# Patient Record
Sex: FEMALE | Race: WHITE | Employment: OTHER | URBAN - METROPOLITAN AREA
[De-identification: names, ages, dates, MRNs, and addresses within clinical notes are randomized per-mention and may not be internally consistent; named-entity substitution may affect disease eponyms.]

---

## 2019-10-09 PROBLEM — R35.0 FREQUENCY OF MICTURITION: Status: ACTIVE | Noted: 2019-10-09

## 2019-10-09 PROBLEM — R30.0 DYSURIA: Status: ACTIVE | Noted: 2019-10-09

## 2019-10-09 PROBLEM — N39.0 RECURRENT UTI: Status: ACTIVE | Noted: 2019-10-09

## 2020-12-14 ENCOUNTER — TELEPHONE (OUTPATIENT)
Dept: SCHEDULING | Facility: IMAGING CENTER | Age: 75
End: 2020-12-14

## 2021-01-04 ENCOUNTER — OFFICE VISIT (OUTPATIENT)
Dept: MEDICAL GROUP | Facility: CLINIC | Age: 76
End: 2021-01-04
Payer: MEDICARE

## 2021-01-04 VITALS
DIASTOLIC BLOOD PRESSURE: 82 MMHG | HEART RATE: 78 BPM | WEIGHT: 199 LBS | OXYGEN SATURATION: 92 % | TEMPERATURE: 98 F | BODY MASS INDEX: 36.62 KG/M2 | SYSTOLIC BLOOD PRESSURE: 122 MMHG | HEIGHT: 62 IN | RESPIRATION RATE: 18 BRPM

## 2021-01-04 DIAGNOSIS — Z23 NEED FOR VACCINATION: ICD-10-CM

## 2021-01-04 DIAGNOSIS — F41.9 ANXIETY: ICD-10-CM

## 2021-01-04 DIAGNOSIS — M19.90 ARTHRITIS: ICD-10-CM

## 2021-01-04 DIAGNOSIS — Z00.00 HEALTHCARE MAINTENANCE: ICD-10-CM

## 2021-01-04 DIAGNOSIS — G62.9 NEUROPATHY: ICD-10-CM

## 2021-01-04 DIAGNOSIS — J45.30 MILD PERSISTENT ASTHMA WITHOUT COMPLICATION: ICD-10-CM

## 2021-01-04 DIAGNOSIS — N39.0 RECURRENT UTI: ICD-10-CM

## 2021-01-04 DIAGNOSIS — F32.0 CURRENT MILD EPISODE OF MAJOR DEPRESSIVE DISORDER WITHOUT PRIOR EPISODE (HCC): ICD-10-CM

## 2021-01-04 PROBLEM — F32.9 CURRENT EPISODE OF MAJOR DEPRESSIVE DISORDER WITHOUT PRIOR EPISODE: Status: ACTIVE | Noted: 2021-01-04

## 2021-01-04 PROCEDURE — 99203 OFFICE O/P NEW LOW 30 MIN: CPT | Mod: 25 | Performed by: PHYSICIAN ASSISTANT

## 2021-01-04 PROCEDURE — 90662 IIV NO PRSV INCREASED AG IM: CPT | Performed by: PHYSICIAN ASSISTANT

## 2021-01-04 PROCEDURE — G0008 ADMIN INFLUENZA VIRUS VAC: HCPCS | Performed by: PHYSICIAN ASSISTANT

## 2021-01-04 RX ORDER — SULFAMETHOXAZOLE AND TRIMETHOPRIM 400; 80 MG/1; MG/1
1 TABLET ORAL DAILY
Qty: 90 TAB | Refills: 0 | Status: SHIPPED | OUTPATIENT
Start: 2021-01-04 | End: 2022-01-12 | Stop reason: SDUPTHER

## 2021-01-04 RX ORDER — GABAPENTIN 100 MG/1
100 CAPSULE ORAL 3 TIMES DAILY
Qty: 90 CAP | Refills: 3 | Status: SHIPPED
Start: 2021-01-04 | End: 2021-02-16

## 2021-01-04 ASSESSMENT — PATIENT HEALTH QUESTIONNAIRE - PHQ9: CLINICAL INTERPRETATION OF PHQ2 SCORE: 0

## 2021-01-04 NOTE — PROGRESS NOTES
cc:  Establish care    Subjective:     Soo Huff is a 75 y.o. female presenting for establish care      Patient presents to the office for \[facility]\"" care.   Patient does have a history of asthma and is controlled on her advair.  She states in the summer she rarely has an issue.     Patient states that she has a history of chronic UTI's and takes a maintenace dose of single dose bactrim.  She states that she usually contacts her urologist Dr. Pedroza for further help.  He is in [place] and so if she cannot get in to see him for an infection, she wants for us to be aware in case an abx is needed.   She is resistant to bactrim for a flare up UTI.      She does travel to [place] twice a week and so will be back and forth.      Patient is on sertraline for anxiety and depression.  She has tried to quit the medication a few times but does get depressed when she goes off the medication.  She has been on the medication longer than 20 years.    Patient states that she has a history of shingles which occurred 4 hears ago.  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      Patient is needing a refill of her bactrim for her recurrent UTI until she can be seen by Urology    Patient states that she has arthritis in her hands.  It hurts worse in the evening.  She states that she will feel it in her hands and elbows.  She states testing has been negative for rheumatoid arthritis.    Review of systems:  See above.   Denies any symptoms unless previously indicated.        Current Outpatient Medications:   •  fluticasone-salmeterol (ADVAIR DISKUS) 250-50 MCG/DOSE AEROSOL POWDER, BREATH ACTIVATED, Inhale 1 Puff 2 times a day., Disp: 1 Each, Rfl: 9  •  gabapentin (NEURONTIN) 100 MG Cap, Take 1 Cap by mouth 3 times a day., Disp: 90 Cap, Rfl: 3  •  sulfamethoxazole-trimethoprim (BACTRIM) 400-80 MG Tab, Take 1 Tab by mouth every day., Disp: 90 Tab, Rfl: 0  •  sertraline (ZOLOFT) 50 MG Tab,  "Take 1 Tab by mouth every day., Disp: 30 Tab, Rfl: 6  •  diclofenac sodium 1 % Gel, Apply 1 g topically 4 times a day., Disp: 350 g, Rfl: 3  •  Pseudoeph-Doxylamine-DM-APAP (NYQUIL PO), Take  by mouth., Disp: , Rfl:   •  Glucos-Chond-Hyal Ac-Ca Fructo (MOVE FREE JOINT HEALTH ADVANCE PO), Take 2 Tabs by mouth., Disp: , Rfl:   •  Multiple Vitamins-Minerals (CENTRUM SILVER 50+WOMEN) Tab, Take 1 Tab by mouth., Disp: , Rfl:     Allergies, past medical history, past surgical history, family history, social history reviewed and updated    Objective:     Vitals: /82 (BP Location: Left arm, Patient Position: Sitting, BP Cuff Size: Adult)   Pulse 78   Temp 36.7 °C (98 °F) (Temporal)   Resp 18   Ht 1.575 m (5' 2\") Comment: with shoes  Wt 90.3 kg (199 lb) Comment: with shoes  SpO2 92%   BMI 36.40 kg/m²   General: Alert, pleasant, NAD  EYES:   PERRL, EOMI, no icterus or pallor.  Conjunctivae and lids normal.   HENT:  Normocephalic.  External ears normal.   Neck supple.    Respiratory: Normal respiratory effort.    Abdomen: obese  Skin: Warm, dry, no rashes.  Musculoskeletal: Gait is normal.  Moves all extremities well.    Extremities: normal range of motion all extremities.   Neurological: No tremors, sensation grossly intact, CN2-12 intact.  Psych:  Affect/mood is normal, judgement is good, memory is intact, grooming is appropriate.    Assessment/Plan:     Soo was seen today for establish care and arthritis.    Diagnoses and all orders for this visit:    Mild persistent asthma without complication  -     fluticasone-salmeterol (ADVAIR DISKUS) 250-50 MCG/DOSE AEROSOL POWDER, BREATH ACTIVATED; Inhale 1 Puff 2 times a day.    We will refill patient's Advair at this time.  Continue to monitor.    Neuropathy  -     gabapentin (NEURONTIN) 100 MG Cap; Take 1 Cap by mouth 3 times a day.    From history of zoster.  Patient would be a candidate for the Zostavax vaccine.  She will discuss this with her insurance first and if " covered, she will have it done in office.  Otherwise we will provide a prescription where she can have it done at the pharmacy.    Healthcare maintenance  -     Lipid Profile; Future  -     Comp Metabolic Panel; Future  -     TSH; Future  -     FREE THYROXINE; Future    Labs have been ordered to evaluate further.    Current mild episode of major depressive disorder without prior episode (HCC)  -     sertraline (ZOLOFT) 50 MG Tab; Take 1 Tab by mouth every day.  Anxiety  -     sertraline (ZOLOFT) 50 MG Tab; Take 1 Tab by mouth every day.    Occasions have been refilled at this time.  We did discuss the possibility of weaning off medication which would be a very slow process which would occur over months.  Patient is aware of this.    Recurrent UTI  -     sulfamethoxazole-trimethoprim (BACTRIM) 400-80 MG Tab; Take 1 Tab by mouth every day.    Refilled until she can discuss with urology.    Arthritis  -     diclofenac sodium 1 % Gel; Apply 1 g topically 4 times a day.    We will try patient on diclofenac.  Patient to let us know if any issues with medication.    Need for vaccination  -     Influenza Vaccine, High Dose (65+ Only)    Flu vaccine given today.    This is a 30-minute appointment with greater than 50% spent in education counseling and coordination of care.        Return in about 4 weeks (around 2/1/2021), or if symptoms worsen or fail to improve, for 4-8 weeks.    Please note that this dictation was created using voice recognition software. I have made every reasonable attempt to correct obvious errors, but expect that there are errors of grammar and possible content that I did not discover before finalizing note.

## 2021-02-03 ENCOUNTER — HOSPITAL ENCOUNTER (OUTPATIENT)
Facility: MEDICAL CENTER | Age: 76
End: 2021-02-03
Attending: PHYSICIAN ASSISTANT
Payer: MEDICARE

## 2021-02-03 ENCOUNTER — NON-PROVIDER VISIT (OUTPATIENT)
Dept: MEDICAL GROUP | Facility: CLINIC | Age: 76
End: 2021-02-03
Payer: MEDICARE

## 2021-02-03 ENCOUNTER — OFFICE VISIT (OUTPATIENT)
Dept: MEDICAL GROUP | Facility: CLINIC | Age: 76
End: 2021-02-03
Payer: MEDICARE

## 2021-02-03 VITALS
DIASTOLIC BLOOD PRESSURE: 78 MMHG | SYSTOLIC BLOOD PRESSURE: 122 MMHG | HEIGHT: 62 IN | TEMPERATURE: 97.8 F | WEIGHT: 201 LBS | RESPIRATION RATE: 18 BRPM | OXYGEN SATURATION: 93 % | BODY MASS INDEX: 36.99 KG/M2 | HEART RATE: 77 BPM

## 2021-02-03 DIAGNOSIS — Z00.00 HEALTHCARE MAINTENANCE: ICD-10-CM

## 2021-02-03 DIAGNOSIS — R30.0 DYSURIA: ICD-10-CM

## 2021-02-03 DIAGNOSIS — B35.4 TINEA CORPORIS: ICD-10-CM

## 2021-02-03 DIAGNOSIS — N39.0 RECURRENT UTI: ICD-10-CM

## 2021-02-03 DIAGNOSIS — R35.0 FREQUENCY OF MICTURITION: ICD-10-CM

## 2021-02-03 LAB
APPEARANCE UR: CLEAR
BILIRUB UR STRIP-MCNC: NORMAL MG/DL
COLOR UR AUTO: YELLOW
GLUCOSE UR STRIP.AUTO-MCNC: NORMAL MG/DL
KETONES UR STRIP.AUTO-MCNC: NORMAL MG/DL
LEUKOCYTE ESTERASE UR QL STRIP.AUTO: NORMAL
NITRITE UR QL STRIP.AUTO: NORMAL
PH UR STRIP.AUTO: 5 [PH] (ref 5–8)
PROT UR QL STRIP: NORMAL MG/DL
RBC UR QL AUTO: NORMAL
SP GR UR STRIP.AUTO: 1.03
UROBILINOGEN UR STRIP-MCNC: 0.2 MG/DL

## 2021-02-03 PROCEDURE — 99213 OFFICE O/P EST LOW 20 MIN: CPT | Performed by: PHYSICIAN ASSISTANT

## 2021-02-03 PROCEDURE — 87086 URINE CULTURE/COLONY COUNT: CPT

## 2021-02-03 PROCEDURE — 81002 URINALYSIS NONAUTO W/O SCOPE: CPT | Performed by: PHYSICIAN ASSISTANT

## 2021-02-03 PROCEDURE — 36415 COLL VENOUS BLD VENIPUNCTURE: CPT | Performed by: PHYSICIAN ASSISTANT

## 2021-02-03 RX ORDER — PHENAZOPYRIDINE HYDROCHLORIDE 200 MG/1
200 TABLET, FILM COATED ORAL 3 TIMES DAILY PRN
Qty: 6 TAB | Refills: 0 | Status: SHIPPED
Start: 2021-02-03 | End: 2021-02-16

## 2021-02-03 RX ORDER — NITROFURANTOIN 25; 75 MG/1; MG/1
100 CAPSULE ORAL 2 TIMES DAILY
Qty: 14 CAP | Refills: 0 | Status: SHIPPED
Start: 2021-02-03 | End: 2021-02-16

## 2021-02-03 RX ORDER — KETOCONAZOLE 20 MG/G
CREAM TOPICAL
Qty: 60 G | Refills: 1 | Status: SHIPPED
Start: 2021-02-03 | End: 2021-11-15

## 2021-02-03 RX ORDER — NITROFURANTOIN 25; 75 MG/1; MG/1
100 CAPSULE ORAL 2 TIMES DAILY
Qty: 14 CAP | Refills: 0 | Status: SHIPPED | OUTPATIENT
Start: 2021-02-03 | End: 2021-02-03 | Stop reason: SDUPTHER

## 2021-02-03 ASSESSMENT — PATIENT HEALTH QUESTIONNAIRE - PHQ9
SUM OF ALL RESPONSES TO PHQ QUESTIONS 1-9: 4
5. POOR APPETITE OR OVEREATING: MORE THAN HALF THE DAYS
2. FEELING DOWN, DEPRESSED, IRRITABLE, OR HOPELESS: NOT AT ALL
8. MOVING OR SPEAKING SO SLOWLY THAT OTHER PEOPLE COULD HAVE NOTICED. OR THE OPPOSITE, BEING SO FIGETY OR RESTLESS THAT YOU HAVE BEEN MOVING AROUND A LOT MORE THAN USUAL: NOT AT ALL
6. FEELING BAD ABOUT YOURSELF - OR THAT YOU ARE A FAILURE OR HAVE LET YOURSELF OR YOUR FAMILY DOWN: NOT AL ALL
SUM OF ALL RESPONSES TO PHQ9 QUESTIONS 1 AND 2: 0
3. TROUBLE FALLING OR STAYING ASLEEP OR SLEEPING TOO MUCH: SEVERAL DAYS
7. TROUBLE CONCENTRATING ON THINGS, SUCH AS READING THE NEWSPAPER OR WATCHING TELEVISION: NOT AT ALL
1. LITTLE INTEREST OR PLEASURE IN DOING THINGS: NOT AT ALL
9. THOUGHTS THAT YOU WOULD BE BETTER OFF DEAD, OR OF HURTING YOURSELF: NOT AT ALL
4. FEELING TIRED OR HAVING LITTLE ENERGY: SEVERAL DAYS

## 2021-02-03 NOTE — PROGRESS NOTES
cc:  UTI symptoms    Subjective:     Soo Huff is a 75 y.o. female presenting for UTI symptoms      Patient presents to the office for UTI symptoms.  Patient states that she is having burning with urination.  She states that she has been drinking cranberry juice. She denies urinating smaller amounts.  She denies blood in the urine.  She denies any other symptoms unless previously indicated.  She is on a prophylaxis bactrim.      Patient has a tinea type rash in the axilla and the abdomen.  It has been a problem for about 2 years.  She states that it developed after a yeast infection.  She states it developed while taking bactrim    Review of systems:  See above.   Denies any symptoms unless previously indicated.        Current Outpatient Medications:   •  phenazopyridine (PYRIDIUM) 200 MG Tab, Take 1 Tab by mouth 3 times a day as needed., Disp: 6 Tab, Rfl: 0  •  nitrofurantoin (MACROBID) 100 MG Cap, Take 1 Cap by mouth 2 times a day., Disp: 14 Cap, Rfl: 0  •  ketoconazole (NIZORAL) 2 % Cream, Apply a small amount  to affected area twice a day for 2 weeks., Disp: 60 g, Rfl: 1  •  fluticasone-salmeterol (ADVAIR DISKUS) 250-50 MCG/DOSE AEROSOL POWDER, BREATH ACTIVATED, Inhale 1 Puff 2 times a day., Disp: 1 Each, Rfl: 9  •  gabapentin (NEURONTIN) 100 MG Cap, Take 1 Cap by mouth 3 times a day., Disp: 90 Cap, Rfl: 3  •  sulfamethoxazole-trimethoprim (BACTRIM) 400-80 MG Tab, Take 1 Tab by mouth every day., Disp: 90 Tab, Rfl: 0  •  sertraline (ZOLOFT) 50 MG Tab, Take 1 Tab by mouth every day., Disp: 30 Tab, Rfl: 6  •  Pseudoeph-Doxylamine-DM-APAP (NYQUIL PO), Take  by mouth., Disp: , Rfl:   •  Glucos-Chond-Hyal Ac-Ca Fructo (MOVE FREE JOINT HEALTH ADVANCE PO), Take 2 Tabs by mouth., Disp: , Rfl:   •  Multiple Vitamins-Minerals (CENTRUM SILVER 50+WOMEN) Tab, Take 1 Tab by mouth., Disp: , Rfl:   •  diclofenac sodium 1 % Gel, Apply 1 g topically 4 times a day., Disp: 350 g, Rfl: 3    Allergies, past medical history, past  "surgical history, family history, social history reviewed and updated    Objective:     Vitals: /78 (BP Location: Left arm, Patient Position: Sitting, BP Cuff Size: Adult)   Pulse 77   Temp 36.6 °C (97.8 °F) (Temporal)   Resp 18   Ht 1.575 m (5' 2\")   Wt 91.2 kg (201 lb)   SpO2 93%   BMI 36.76 kg/m²   General: Alert, pleasant, NAD  EYES:   PERRL, EOMI, no icterus or pallor.  Conjunctivae and lids normal.   HENT:  Normocephalic.  External ears normal.  Neck supple.     Respiratory: Normal respiratory effort.    Abdomen: obese  Skin: Warm, dry, tinea corporis-year-old type rash under left axilla and left side of abdomen.    Musculoskeletal: Gait is normal.  Moves all extremities well.    Extremities: normal range of motion all extremities.   Neurological: No tremors, sensation grossly intact,  CN2-12 intact.  Psych:  Affect/mood is normal, judgement is good, memory is intact, grooming is appropriate.    Assessment/Plan:     Soo was seen today for uti.    Diagnoses and all orders for this visit:    Recurrent UTI  -     POCT Urinalysis  -     URINE CULTURE(NEW)  -     phenazopyridine (PYRIDIUM) 200 MG Tab; Take 1 Tab by mouth 3 times a day as needed.  -     Discontinue: nitrofurantoin (MACROBID) 100 MG Cap; Take 1 Cap by mouth 2 times a day.  -     nitrofurantoin (MACROBID) 100 MG Cap; Take 1 Cap by mouth 2 times a day.    Patient is on a maintenance dose of Bactrim.  We will provide her Pyridium to help with symptoms she is currently experiencing.  Urine shows an SG of 1.030 and a urobilinogen of 0.2, otherwise the urine is negative.  We will send for culture but it will most likely return around the weekend.  Therefore we are ordering nitrofurantoin that patient can  at the pharmacy in case her symptoms worsen as she would like to wait for the culture to come back first.  Side effects of Pyridium discussed with patient.    Tinea corporis  -     ketoconazole (NIZORAL) 2 % Cream; Apply a small " amount  to affected area twice a day for 2 weeks.    Rx for ketoconazole sent to the pharmacy on file.        No follow-ups on file.    Please note that this dictation was created using voice recognition software. I have made every reasonable attempt to correct obvious errors, but expect that there are errors of grammar and possible content that I did not discover before finalizing note.

## 2021-02-05 LAB
BACTERIA UR CULT: NORMAL
SIGNIFICANT IND 70042: NORMAL
SITE SITE: NORMAL
SOURCE SOURCE: NORMAL

## 2021-02-08 ENCOUNTER — TELEPHONE (OUTPATIENT)
Dept: MEDICAL GROUP | Facility: CLINIC | Age: 76
End: 2021-02-08

## 2021-02-08 NOTE — TELEPHONE ENCOUNTER
Talked to Sherri at [facility] lab and they will cancel all blood work but they are still running the urine.    Tanner Corley Ass'mai        The blood work was ordered under health maintenance.  If they want to cancel this they can.  The urine culture was ordered on a separate day for recurrent UTI.  This should not be cancelled as we are evaluating for illness.  Please have lab run the urine.    Message text        You  Sagrario Marley P.A.-C. 4 hours ago (10:32 AM)     The code that was the issue was Z00.00     Tanner Corley Ass't    Message text        Sagrario Marley P.A.-C. routed conversation to [facility] 4 hours ago (10:25 AM)      Sagrario Marley P.A.-C.  You 7 hours ago (7:17 AM)     Which code is the issue?    Message text       You routed conversation to Sagrario Marley P.A.-C. 7 hours ago (7:14 AM)      You 7 hours ago (7:14 AM)                                                             [facility] called and stated that there was a Medical violation flag and that they needed a a new ICD 10 code for her labs.     [phone]    Please advise     Tanner Corley Ass't

## 2021-02-16 ENCOUNTER — OFFICE VISIT (OUTPATIENT)
Dept: MEDICAL GROUP | Facility: CLINIC | Age: 76
End: 2021-02-16
Payer: MEDICARE

## 2021-02-16 VITALS
DIASTOLIC BLOOD PRESSURE: 72 MMHG | TEMPERATURE: 97 F | HEART RATE: 72 BPM | SYSTOLIC BLOOD PRESSURE: 120 MMHG | HEIGHT: 62 IN | BODY MASS INDEX: 36.62 KG/M2 | RESPIRATION RATE: 16 BRPM | WEIGHT: 199 LBS | OXYGEN SATURATION: 96 %

## 2021-02-16 DIAGNOSIS — J45.30 MILD PERSISTENT ASTHMA WITHOUT COMPLICATION: ICD-10-CM

## 2021-02-16 DIAGNOSIS — R53.83 OTHER FATIGUE: ICD-10-CM

## 2021-02-16 DIAGNOSIS — E78.49 OTHER HYPERLIPIDEMIA: ICD-10-CM

## 2021-02-16 DIAGNOSIS — D51.9 ANEMIA DUE TO VITAMIN B12 DEFICIENCY, UNSPECIFIED B12 DEFICIENCY TYPE: ICD-10-CM

## 2021-02-16 DIAGNOSIS — F41.9 ANXIETY: ICD-10-CM

## 2021-02-16 DIAGNOSIS — G62.9 NEUROPATHY: ICD-10-CM

## 2021-02-16 DIAGNOSIS — M19.90 ARTHRITIS: ICD-10-CM

## 2021-02-16 DIAGNOSIS — F32.0 CURRENT MILD EPISODE OF MAJOR DEPRESSIVE DISORDER WITHOUT PRIOR EPISODE (HCC): ICD-10-CM

## 2021-02-16 PROCEDURE — 99214 OFFICE O/P EST MOD 30 MIN: CPT | Performed by: PHYSICIAN ASSISTANT

## 2021-02-16 RX ORDER — GABAPENTIN 300 MG/1
300 CAPSULE ORAL 3 TIMES DAILY
Qty: 270 CAPSULE | Refills: 1 | Status: SHIPPED | OUTPATIENT
Start: 2021-02-16 | End: 2021-11-19

## 2021-02-16 RX ORDER — SERTRALINE HYDROCHLORIDE 100 MG/1
100 TABLET, FILM COATED ORAL DAILY
Qty: 90 TABLET | Refills: 1 | Status: SHIPPED | OUTPATIENT
Start: 2021-02-16 | End: 2021-10-06

## 2021-02-16 NOTE — PROGRESS NOTES
cc:  Discuss labs    Subjective:     Soo Huff is a 75 y.o. female presenting for discuss labs      Patient presents to the office for discuss labs.  Her last labs were cancelled.  She does report today having high cholesterol with previous labs.She also reports being anemic in the past with b12 deficiency. She has been fatigued.      She states that the gabapentin 300 mg  3 times a day and would like the Rx to be rewritten.  She states that 100 mg 3 times a day is too low.    Patient states that she is taking 50 but she was getting 100 mg and cutting them in half.  She states that she has been having increased depression with a move.  She states that when she misses a dose she begins crying.     Patient also indicates that her Advair discus needs to be rewritten.  She states that she did not receive the Diskus.  Upon further evaluation, it does appear that we have written for the Diskus.  Patient indicates that she will check with the pharmacy to see if there is an insurance issue.      Patient indicates that she has not yet tried the diclofenac gel for her arthritis but has picked it up.        Review of systems:  See above.   Denies any symptoms unless previously indicated.        Current Outpatient Medications:   •  sertraline (ZOLOFT) 100 MG Tab, Take 1 tablet by mouth every day., Disp: 90 tablet, Rfl: 1  •  gabapentin (NEURONTIN) 300 MG Cap, Take 1 capsule by mouth 3 times a day., Disp: 270 capsule, Rfl: 1  •  fluticasone-salmeterol (ADVAIR DISKUS) 250-50 MCG/DOSE AEROSOL POWDER, BREATH ACTIVATED, Inhale 1 Puff 2 times a day., Disp: 1 Each, Rfl: 9  •  diclofenac sodium 1 % Gel, Apply 1 g topically 4 times a day., Disp: 350 g, Rfl: 3  •  ketoconazole (NIZORAL) 2 % Cream, Apply a small amount  to affected area twice a day for 2 weeks., Disp: 60 g, Rfl: 1  •  sulfamethoxazole-trimethoprim (BACTRIM) 400-80 MG Tab, Take 1 Tab by mouth every day., Disp: 90 Tab, Rfl: 0  •  Glucos-Chond-Hyal Ac-Ca Fructo (MOVE FREE  "JOINT HEALTH ADVANCE PO), Take 2 Tabs by mouth., Disp: , Rfl:   •  Multiple Vitamins-Minerals (CENTRUM SILVER 50+WOMEN) Tab, Take 1 Tab by mouth., Disp: , Rfl:   •  Pseudoeph-Doxylamine-DM-APAP (NYQUIL PO), Take  by mouth., Disp: , Rfl:     Allergies, past medical history, past surgical history, family history, social history reviewed and updated    Objective:     Vitals: /72 (BP Location: Left arm, Patient Position: Sitting, BP Cuff Size: Large adult)   Pulse 72   Temp 36.1 °C (97 °F) (Temporal)   Resp 16   Ht 1.575 m (5' 2\") Comment: with shoes  Wt 90.3 kg (199 lb) Comment: with shoes  SpO2 96%   BMI 36.40 kg/m²   General: Alert, pleasant, NAD  EYES:   PERRL, EOMI, no icterus or pallor.  Conjunctivae and lids normal.   HENT:  Normocephalic.  External ears normal.  Neck supple.     Respiratory: Normal respiratory effort.   Abdomen: obese  Skin: Warm, dry, no rashes.  Musculoskeletal: Gait is normal.  Moves all extremities well.    Neurological: No tremors, sensation grossly intact, CN2-12 intact.  Psych:  Affect/mood is normal, judgement is good, memory is intact, grooming is appropriate.    Assessment/Plan:     Soo was seen today for lab results and medication follow-up.    Diagnoses and all orders for this visit:    Current mild episode of major depressive disorder without prior episode (HCC)  -     sertraline (ZOLOFT) 100 MG Tab; Take 1 tablet by mouth every day.  Anxiety  -     sertraline (ZOLOFT) 100 MG Tab; Take 1 tablet by mouth every day.    We have increased the sertraline to 100 mg daily.  The plan will be to follow-up in approximately 4 to 6 weeks to see how the patient is doing with the increased dose.    Neuropathy  -     gabapentin (NEURONTIN) 300 MG Cap; Take 1 capsule by mouth 3 times a day.    Medication has been adjusted to 300 mg 3 times a day.    Mild persistent asthma without complication  -     fluticasone-salmeterol (ADVAIR DISKUS) 250-50 MCG/DOSE AEROSOL POWDER, BREATH " ACTIVATED; Inhale 1 Puff 2 times a day.    I have resubmitted the Advair Diskus.  However patient will contact the pharmacy and see if there may be an insurance issue.    Arthritis  -     diclofenac sodium 1 % Gel; Apply 1 g topically 4 times a day.    Medication was not resent at this time as patient has just picked up a new prescription.  However as this was taken off of her list, we have readded it.    Other hyperlipidemia  -     Lipid Profile; Future  -     Comp Metabolic Panel; Future  Other fatigue  -     CBC WITH DIFFERENTIAL; Future  -     Comp Metabolic Panel; Future  -     TSH; Future  -     FREE THYROXINE; Future  -     FOLATE; Future  -     VITAMIN B12; Future  -     IRON/TOTAL IRON BIND; Future  Anemia due to vitamin B12 deficiency, unspecified B12 deficiency type  -     CBC WITH DIFFERENTIAL; Future  -     FOLATE; Future  -     VITAMIN B12; Future  -     IRON/TOTAL IRON BIND; Future    Labs were drawn but not run as they were not covered under her insurance.  Patient acknowledges that she does have a history of hyperlipidemia, fatigue, and anemia due to B12 deficiency.  Therefore we will recheck labs given this further information.        Return in about 4 weeks (around 3/16/2021), or if symptoms worsen or fail to improve.    Please note that this dictation was created using voice recognition software. I have made every reasonable attempt to correct obvious errors, but expect that there are errors of grammar and possible content that I did not discover before finalizing note.

## 2021-03-30 ENCOUNTER — NON-PROVIDER VISIT (OUTPATIENT)
Dept: MEDICAL GROUP | Facility: CLINIC | Age: 76
End: 2021-03-30
Payer: MEDICARE

## 2021-03-30 ENCOUNTER — HOSPITAL ENCOUNTER (OUTPATIENT)
Facility: MEDICAL CENTER | Age: 76
End: 2021-03-30
Attending: PHYSICIAN ASSISTANT
Payer: MEDICARE

## 2021-03-30 PROCEDURE — 83540 ASSAY OF IRON: CPT

## 2021-03-30 PROCEDURE — 36415 COLL VENOUS BLD VENIPUNCTURE: CPT | Performed by: PHYSICIAN ASSISTANT

## 2021-03-30 PROCEDURE — 80061 LIPID PANEL: CPT

## 2021-03-30 PROCEDURE — 82607 VITAMIN B-12: CPT

## 2021-03-30 PROCEDURE — 83550 IRON BINDING TEST: CPT

## 2021-03-30 PROCEDURE — 84439 ASSAY OF FREE THYROXINE: CPT

## 2021-03-30 PROCEDURE — 84443 ASSAY THYROID STIM HORMONE: CPT

## 2021-03-30 PROCEDURE — 85025 COMPLETE CBC W/AUTO DIFF WBC: CPT

## 2021-03-30 PROCEDURE — 82746 ASSAY OF FOLIC ACID SERUM: CPT

## 2021-03-30 PROCEDURE — 80053 COMPREHEN METABOLIC PANEL: CPT

## 2021-03-31 DIAGNOSIS — R53.83 OTHER FATIGUE: ICD-10-CM

## 2021-03-31 DIAGNOSIS — D51.9 ANEMIA DUE TO VITAMIN B12 DEFICIENCY, UNSPECIFIED B12 DEFICIENCY TYPE: ICD-10-CM

## 2021-03-31 DIAGNOSIS — E78.49 OTHER HYPERLIPIDEMIA: ICD-10-CM

## 2021-03-31 LAB
ALBUMIN SERPL BCP-MCNC: 4.1 G/DL (ref 3.2–4.9)
ALBUMIN/GLOB SERPL: 1.4 G/DL
ALP SERPL-CCNC: 82 U/L (ref 30–99)
ALT SERPL-CCNC: 21 U/L (ref 2–50)
ANION GAP SERPL CALC-SCNC: 9 MMOL/L (ref 7–16)
AST SERPL-CCNC: 23 U/L (ref 12–45)
BASOPHILS # BLD AUTO: 0.8 % (ref 0–1.8)
BASOPHILS # BLD: 0.05 K/UL (ref 0–0.12)
BILIRUB SERPL-MCNC: 0.5 MG/DL (ref 0.1–1.5)
BUN SERPL-MCNC: 16 MG/DL (ref 8–22)
CALCIUM SERPL-MCNC: 9.2 MG/DL (ref 8.5–10.5)
CHLORIDE SERPL-SCNC: 106 MMOL/L (ref 96–112)
CHOLEST SERPL-MCNC: 225 MG/DL (ref 100–199)
CO2 SERPL-SCNC: 27 MMOL/L (ref 20–33)
CREAT SERPL-MCNC: 0.75 MG/DL (ref 0.5–1.4)
EOSINOPHIL # BLD AUTO: 0.75 K/UL (ref 0–0.51)
EOSINOPHIL NFR BLD: 11.6 % (ref 0–6.9)
ERYTHROCYTE [DISTWIDTH] IN BLOOD BY AUTOMATED COUNT: 47.3 FL (ref 35.9–50)
FOLATE SERPL-MCNC: 12.6 NG/ML
GLOBULIN SER CALC-MCNC: 3 G/DL (ref 1.9–3.5)
GLUCOSE SERPL-MCNC: 91 MG/DL (ref 65–99)
HCT VFR BLD AUTO: 45 % (ref 37–47)
HDLC SERPL-MCNC: 40 MG/DL
HGB BLD-MCNC: 14.1 G/DL (ref 12–16)
IMM GRANULOCYTES # BLD AUTO: 0.03 K/UL (ref 0–0.11)
IMM GRANULOCYTES NFR BLD AUTO: 0.5 % (ref 0–0.9)
IRON SATN MFR SERPL: 41 % (ref 15–55)
IRON SERPL-MCNC: 101 UG/DL (ref 40–170)
LDLC SERPL CALC-MCNC: 135 MG/DL
LYMPHOCYTES # BLD AUTO: 1.8 K/UL (ref 1–4.8)
LYMPHOCYTES NFR BLD: 27.9 % (ref 22–41)
MCH RBC QN AUTO: 29.5 PG (ref 27–33)
MCHC RBC AUTO-ENTMCNC: 31.3 G/DL (ref 33.6–35)
MCV RBC AUTO: 94.1 FL (ref 81.4–97.8)
MONOCYTES # BLD AUTO: 0.5 K/UL (ref 0–0.85)
MONOCYTES NFR BLD AUTO: 7.7 % (ref 0–13.4)
NEUTROPHILS # BLD AUTO: 3.33 K/UL (ref 2–7.15)
NEUTROPHILS NFR BLD: 51.5 % (ref 44–72)
NRBC # BLD AUTO: 0 K/UL
NRBC BLD-RTO: 0 /100 WBC
PLATELET # BLD AUTO: 231 K/UL (ref 164–446)
PMV BLD AUTO: 12.1 FL (ref 9–12.9)
POTASSIUM SERPL-SCNC: 4.7 MMOL/L (ref 3.6–5.5)
PROT SERPL-MCNC: 7.1 G/DL (ref 6–8.2)
RBC # BLD AUTO: 4.78 M/UL (ref 4.2–5.4)
SODIUM SERPL-SCNC: 142 MMOL/L (ref 135–145)
T4 FREE SERPL-MCNC: 0.81 NG/DL (ref 0.93–1.7)
TIBC SERPL-MCNC: 245 UG/DL (ref 250–450)
TRIGL SERPL-MCNC: 249 MG/DL (ref 0–149)
TSH SERPL DL<=0.005 MIU/L-ACNC: 5.52 UIU/ML (ref 0.38–5.33)
UIBC SERPL-MCNC: 144 UG/DL (ref 110–370)
VIT B12 SERPL-MCNC: 285 PG/ML (ref 211–911)
WBC # BLD AUTO: 6.5 K/UL (ref 4.8–10.8)

## 2021-04-01 ENCOUNTER — OFFICE VISIT (OUTPATIENT)
Dept: MEDICAL GROUP | Facility: CLINIC | Age: 76
End: 2021-04-01
Payer: MEDICARE

## 2021-04-01 VITALS
BODY MASS INDEX: 37.21 KG/M2 | HEART RATE: 64 BPM | DIASTOLIC BLOOD PRESSURE: 72 MMHG | WEIGHT: 202.2 LBS | SYSTOLIC BLOOD PRESSURE: 124 MMHG | HEIGHT: 62 IN | RESPIRATION RATE: 16 BRPM | OXYGEN SATURATION: 94 % | TEMPERATURE: 98.2 F

## 2021-04-01 DIAGNOSIS — M72.2 PLANTAR FASCIITIS: ICD-10-CM

## 2021-04-01 DIAGNOSIS — E78.49 OTHER HYPERLIPIDEMIA: ICD-10-CM

## 2021-04-01 DIAGNOSIS — D50.9 IRON DEFICIENCY ANEMIA, UNSPECIFIED IRON DEFICIENCY ANEMIA TYPE: ICD-10-CM

## 2021-04-01 DIAGNOSIS — K59.1 FUNCTIONAL DIARRHEA: ICD-10-CM

## 2021-04-01 PROCEDURE — 99214 OFFICE O/P EST MOD 30 MIN: CPT | Performed by: PHYSICIAN ASSISTANT

## 2021-04-01 ASSESSMENT — FIBROSIS 4 INDEX: FIB4 SCORE: 1.63

## 2021-04-01 NOTE — PATIENT INSTRUCTIONS
1.  Eat smaller meals  2.  Eat lean proteins  3.  Eat complex carbohydrates-wheat/oatmeal/whole grains  4.  Take a fiber supplement  5.  Drink most fluids between meals    Foot  1.  Water bottle  2.  Towel   3.  wall

## 2021-04-01 NOTE — PROGRESS NOTES
"cc:  Foot pain    Subjective:     Soo Huff is a 75 y.o. female presenting for foot pain      Patient presents to the office for foot pain.  Patient presents to the office for multiple issues but her greatest concerns are her feet and her bowels.       Patient states that she cannot walk on her heel of the right foot without having pain go through her foot.  She states that she is wearing a compression stocking.  Her pain is midfoot and goes up the leg.  She has had this for a couple of weeks.  She states that the more she walks on it, the better it gets to a certain point and then it will just hurt when she is on her foot.  She worked as an  and could not do her job last night.    Patient states that she has been having loose stools for about a month.  She states that she thought it was nerves but it is not going away.  She states that she is still trying to by a house.  She states she is nervous about \"everything\" right now. She states that right after she eats, she has to rush to the bathroom.  She states that she has had a few accidents.     Review of systems:  See above.   Denies any symptoms unless previously indicated.        Current Outpatient Medications:   •  sertraline (ZOLOFT) 100 MG Tab, Take 1 tablet by mouth every day., Disp: 90 tablet, Rfl: 1  •  gabapentin (NEURONTIN) 300 MG Cap, Take 1 capsule by mouth 3 times a day., Disp: 270 capsule, Rfl: 1  •  fluticasone-salmeterol (ADVAIR DISKUS) 250-50 MCG/DOSE AEROSOL POWDER, BREATH ACTIVATED, Inhale 1 Puff 2 times a day., Disp: 1 Each, Rfl: 9  •  diclofenac sodium 1 % Gel, Apply 1 g topically 4 times a day., Disp: 350 g, Rfl: 3  •  ketoconazole (NIZORAL) 2 % Cream, Apply a small amount  to affected area twice a day for 2 weeks., Disp: 60 g, Rfl: 1  •  sulfamethoxazole-trimethoprim (BACTRIM) 400-80 MG Tab, Take 1 Tab by mouth every day., Disp: 90 Tab, Rfl: 0  •  Pseudoeph-Doxylamine-DM-APAP (NYQUIL PO), Take  by mouth., Disp: , Rfl:   •  " "Glucos-Chond-Hyal Ac-Ca Fructo (MOVE FREE JOINT HEALTH ADVANCE PO), Take 2 Tabs by mouth., Disp: , Rfl:   •  Multiple Vitamins-Minerals (CENTRUM SILVER 50+WOMEN) Tab, Take 1 Tab by mouth., Disp: , Rfl:     Allergies, past medical history, past surgical history, family history, social history reviewed and updated    Objective:     Vitals: /72   Pulse 64   Temp 36.8 °C (98.2 °F) (Temporal)   Resp 16   Ht 1.575 m (5' 2\")   Wt 91.7 kg (202 lb 3.2 oz)   SpO2 94%   BMI 36.98 kg/m²   General: Alert, pleasant, NAD  EYES:   PERRL, EOMI, no icterus or pallor.  Conjunctivae and lids normal.   HENT:  Normocephalic.  External ears normal.   Neck supple.   No thyromegaly or masses palpated. No cervical or supraclavicular lymphadenopathy.  Respiratory: Normal respiratory effort.    Abdomen: obese.  Skin: Warm, dry, no rashes.  Musculoskeletal: Gait is normal.  Moves all extremities well.    Extremities: Taut aponeurosis right foot.  Patient has pain upon palpation plantar surface at arch..   Neurological: No tremors, sensation grossly intact, gait is normal, CN2-12 intact.  Psych:  Affect/mood is normal, judgement is good, memory is intact, grooming is appropriate.    Assessment/Plan:     Soo was seen today for foot pain, shoulder pain and lab results.    Diagnoses and all orders for this visit:    Plantar fasciitis     Discussed exercises for patient.  She will take anti-inflammatories as directed.  And the plan will be to follow-up in 2 weeks.  If there is no significant change, we will need to consider referral to podiatry for further evaluation.    Functional diarrhea  -     REFERRAL TO GASTROENTEROLOGY believe patient is experiencing dumping syndrome.    There may be an irritable bowel component as well.  I recommend diet changes at this time.  We are referring to gastroenterology as patient is anemic and I do believe a colonoscopy is needed at this time.  However my hope is that diarrhea will improve for " patient.    Iron deficiency anemia, unspecified iron deficiency anemia type  -     REFERRAL TO GASTROENTEROLOGY    Colonoscopy needed.  Patient referred to gastroenterology.    Other hyperlipidemia    Elevated with most recent labs.  Will need to discuss further at follow-up in 2 weeks.        Return in about 2 weeks (around 4/15/2021), or if symptoms worsen or fail to improve.    Please note that this dictation was created using voice recognition software. I have made every reasonable attempt to correct obvious errors, but expect that there are errors of grammar and possible content that I did not discover before finalizing note.

## 2021-05-04 RX ORDER — FLUTICASONE PROPIONATE 50 MCG
1 SPRAY, SUSPENSION (ML) NASAL DAILY
Qty: 16 G | Refills: 5 | Status: SHIPPED | OUTPATIENT
Start: 2021-05-04 | End: 2022-06-06

## 2021-05-04 RX ORDER — FLUTICASONE PROPIONATE 50 MCG
1 SPRAY, SUSPENSION (ML) NASAL DAILY
COMMUNITY
End: 2021-05-04 | Stop reason: SDUPTHER

## 2021-05-04 NOTE — TELEPHONE ENCOUNTER
Was the patient seen in the last year in this department? YES   LOV 04/01/2021     Does patient have an active prescription for medications requested? Yes    Received Request Via: Pharmacy    Hospital Outpatient Visit on 03/30/2021   Component Date Value   • Sodium 03/30/2021 142    • Potassium 03/30/2021 4.7    • Chloride 03/30/2021 106    • Co2 03/30/2021 27    • Anion Gap 03/30/2021 9.0    • Glucose 03/30/2021 91    • Bun 03/30/2021 16    • Creatinine 03/30/2021 0.75    • Calcium 03/30/2021 9.2    • AST(SGOT) 03/30/2021 23    • ALT(SGPT) 03/30/2021 21    • Alkaline Phosphatase 03/30/2021 82    • Total Bilirubin 03/30/2021 0.5    • Albumin 03/30/2021 4.1    • Total Protein 03/30/2021 7.1    • Globulin 03/30/2021 3.0    • A-G Ratio 03/30/2021 1.4    • WBC 03/30/2021 6.5    • RBC 03/30/2021 4.78    • Hemoglobin 03/30/2021 14.1    • Hematocrit 03/30/2021 45.0    • MCV 03/30/2021 94.1    • MCH 03/30/2021 29.5    • MCHC 03/30/2021 31.3*   • RDW 03/30/2021 47.3    • Platelet Count 03/30/2021 231    • MPV 03/30/2021 12.1    • Neutrophils-Polys 03/30/2021 51.50    • Lymphocytes 03/30/2021 27.90    • Monocytes 03/30/2021 7.70    • Eosinophils 03/30/2021 11.60*   • Basophils 03/30/2021 0.80    • Immature Granulocytes 03/30/2021 0.50    • Nucleated RBC 03/30/2021 0.00    • Neutrophils (Absolute) 03/30/2021 3.33    • Lymphs (Absolute) 03/30/2021 1.80    • Monos (Absolute) 03/30/2021 0.50    • Eos (Absolute) 03/30/2021 0.75*   • Baso (Absolute) 03/30/2021 0.05    • Immature Granulocytes (a* 03/30/2021 0.03    • NRBC (Absolute) 03/30/2021 0.00    • Cholesterol,Tot 03/30/2021 225*   • Triglycerides 03/30/2021 249*   • HDL 03/30/2021 40    • LDL 03/30/2021 135*   • Iron 03/30/2021 101    • Total Iron Binding 03/30/2021 245*   • Unsat Iron Binding 03/30/2021 144    • % Saturation 03/30/2021 41    • Vitamin B12 -True Cobala* 03/30/2021 285    • Folate -Folic Acid 03/30/2021 12.6    • Free T-4 03/30/2021 0.81*   • TSH 03/30/2021  5.520*   • GFR If  03/30/2021 >60    • GFR If Non  Ameri* 03/30/2021 >60    Hospital Outpatient Visit on 02/03/2021   Component Date Value   • Significant Indicator 02/03/2021 NEG    • Source 02/03/2021 UR    • Site 02/03/2021 UNKNOWN    • Culture Result 02/03/2021 Usual skin soo <10,000 cfu/mL    Office Visit on 02/03/2021   Component Date Value   • POC Color 02/03/2021 yellow    • POC Appearance 02/03/2021 clear    • POC Leukocyte Esterase 02/03/2021 NEG    • POC Nitrites 02/03/2021 NEG    • POC Urobiligen 02/03/2021 0.2    • POC Protein 02/03/2021 NEG    • POC Urine PH 02/03/2021 5.0    • POC Blood 02/03/2021 NEG    • POC Specific Gravity 02/03/2021 1.030    • POC Ketones 02/03/2021 NEG    • POC Bilirubin 02/03/2021 NEG    • POC Glucose 02/03/2021 NEG    Hospital Outpatient Visit on 11/09/2020   Component Date Value   • Color 11/09/2020 YELLOW    • Character 11/09/2020 SL CLOUDY*   • Specific Gravity 11/09/2020 >=1.030*   • Ph 11/09/2020 5.5    • Glucose 11/09/2020 NEGATIVE    • Ketones 11/09/2020 NEGATIVE    • Protein 11/09/2020 NEGATIVE    • Bilirubin 11/09/2020 NEGATIVE    • Urobilinogen, Urine 11/09/2020 0.2    • Nitrite 11/09/2020 NEGATIVE    • Leukocyte Esterase 11/09/2020 SMALL*   • Occult Blood 11/09/2020 NEGATIVE    • Culture Indicated 11/09/2020 Yes    • WBC 11/09/2020 20-50*   • RBC 11/09/2020 None Seen    • Bacteria 11/09/2020 25-50*   • Epithelial Cells 11/09/2020 6-10*   • Mucous Threads 11/09/2020 1+    • Urine Other 11/09/2020 See Below    • Significant Indicator 11/09/2020 POS*   • Source 11/09/2020 UR    • Site 11/09/2020 -    • Urine Culture 11/09/2020 -*   • Urine Culture 11/09/2020 *                    Value:Escherichia coli  >100,000 cfu/mL     Hospital Outpatient Visit on 09/11/2020   Component Date Value   • Color 09/11/2020 YELLOW    • Character 09/11/2020 CLOUDY*   • Specific Gravity 09/11/2020 1.020    • Ph 09/11/2020 5.5    • Glucose 09/11/2020 NEGATIVE    •  Ketones 09/11/2020 NEGATIVE    • Protein 09/11/2020 NEGATIVE    • Bilirubin 09/11/2020 NEGATIVE    • Urobilinogen, Urine 09/11/2020 0.2    • Nitrite 09/11/2020 POSITIVE*   • Leukocyte Esterase 09/11/2020 SMALL*   • Occult Blood 09/11/2020 TRACE*   • Culture Indicated 09/11/2020 Yes    • WBC 09/11/2020 20-50*   • RBC 09/11/2020 Rare    • Bacteria 09/11/2020 >100*   • Epithelial Cells 09/11/2020 Rare    • Mucous Threads 09/11/2020 3+    • Urine Other 09/11/2020 See Below    • Significant Indicator 09/11/2020 POS*   • Source 09/11/2020 UR    • Site 09/11/2020 -    • Urine Culture 09/11/2020 -*   • Urine Culture 09/11/2020 *                    Value:Escherichia coli  >100,000 cfu/ml     ]

## 2021-06-28 ENCOUNTER — OFFICE VISIT (OUTPATIENT)
Dept: MEDICAL GROUP | Facility: CLINIC | Age: 76
End: 2021-06-28
Payer: MEDICARE

## 2021-06-28 VITALS
BODY MASS INDEX: 36.73 KG/M2 | SYSTOLIC BLOOD PRESSURE: 128 MMHG | TEMPERATURE: 97.8 F | WEIGHT: 199.6 LBS | OXYGEN SATURATION: 91 % | HEIGHT: 62 IN | RESPIRATION RATE: 16 BRPM | DIASTOLIC BLOOD PRESSURE: 86 MMHG | HEART RATE: 82 BPM

## 2021-06-28 DIAGNOSIS — R09.82 POST-NASAL DRIP: ICD-10-CM

## 2021-06-28 DIAGNOSIS — G89.29 CHRONIC PAIN OF RIGHT KNEE: ICD-10-CM

## 2021-06-28 DIAGNOSIS — N39.0 RECURRENT UTI: ICD-10-CM

## 2021-06-28 DIAGNOSIS — M25.561 CHRONIC PAIN OF RIGHT KNEE: ICD-10-CM

## 2021-06-28 DIAGNOSIS — N95.2 VAGINAL ATROPHY: ICD-10-CM

## 2021-06-28 PROCEDURE — 99214 OFFICE O/P EST MOD 30 MIN: CPT | Performed by: PHYSICIAN ASSISTANT

## 2021-06-28 RX ORDER — ESTRADIOL 0.1 MG/G
CREAM VAGINAL
Qty: 42.5 G | Refills: 2 | Status: SHIPPED
Start: 2021-06-28 | End: 2021-11-15

## 2021-06-28 RX ORDER — MONTELUKAST SODIUM 10 MG/1
10 TABLET ORAL DAILY
Qty: 30 TABLET | Refills: 3 | Status: SHIPPED
Start: 2021-06-28 | End: 2021-11-15

## 2021-06-28 ASSESSMENT — FIBROSIS 4 INDEX: FIB4 SCORE: 1.63

## 2021-06-28 NOTE — PROGRESS NOTES
cc:  phlegm    Subjective:     Soo Huff is a 75 y.o. female presenting for phlegm      Patient presents to the office for phlegm.  Patient states that she has had increased phlegm for the last 2-3 weeks.  She states that she is having to clear her throat.  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  She has been taking zyrtec on occasion.      Patient is also concerned about UTI symptoms.  She is prone to UTI's and has previously seen Urology.  Patient states that she has pulling sensation.  Sometimes it burns and sometimes it does not.  Sometimes it will itch.       Patient does bring information from when she was seen in the urgent care a couple of weeks ago at [facility].  She indicates that she had x-rays done.  She was told that she did have arthritis.  She states that her knee is feeling better and although she was referred to orthopedics at the time, she is considering not going to orthopedics for further follow-up.  She has been trying to rest ice and wear a knee brace to help with her symptoms.    Review of systems:  See above.   Denies any symptoms unless previously indicated.        Current Outpatient Medications:   •  estradiol (ESTRACE) 0.1 MG/GM vaginal cream, Use a small amount in the affected area 2-3 times a week., Disp: 42.5 g, Rfl: 2  •  montelukast (SINGULAIR) 10 MG Tab, Take 1 tablet by mouth every day., Disp: 30 tablet, Rfl: 3  •  fluticasone (FLONASE) 50 MCG/ACT nasal spray, Administer 1 Spray into affected nostril(S) every day., Disp: 16 g, Rfl: 5  •  sertraline (ZOLOFT) 100 MG Tab, Take 1 tablet by mouth every day., Disp: 90 tablet, Rfl: 1  •  gabapentin (NEURONTIN) 300 MG Cap, Take 1 capsule by mouth 3 times a day., Disp: 270 capsule, Rfl: 1  •  fluticasone-salmeterol (ADVAIR DISKUS) 250-50 MCG/DOSE AEROSOL POWDER, BREATH ACTIVATED, Inhale 1 Puff 2 times a day., Disp: 1 Each, Rfl: 9  •  diclofenac sodium 1 % Gel, Apply 1 g topically  "4 times a day., Disp: 350 g, Rfl: 3  •  sulfamethoxazole-trimethoprim (BACTRIM) 400-80 MG Tab, Take 1 Tab by mouth every day., Disp: 90 Tab, Rfl: 0  •  Pseudoeph-Doxylamine-DM-APAP (NYQUIL PO), Take  by mouth., Disp: , Rfl:   •  Glucos-Chond-Hyal Ac-Ca Fructo (MOVE FREE JOINT HEALTH ADVANCE PO), Take 2 Tabs by mouth., Disp: , Rfl:   •  Multiple Vitamins-Minerals (CENTRUM SILVER 50+WOMEN) Tab, Take 1 Tab by mouth., Disp: , Rfl:   •  ketoconazole (NIZORAL) 2 % Cream, Apply a small amount  to affected area twice a day for 2 weeks. (Patient not taking: Reported on 6/10/2021), Disp: 60 g, Rfl: 1    Allergies, past medical history, past surgical history, family history, social history reviewed and updated    Objective:     Vitals: /86   Pulse 82   Temp 36.6 °C (97.8 °F) (Temporal)   Resp 16   Ht 1.575 m (5' 2\")   Wt 90.5 kg (199 lb 9.6 oz)   SpO2 91%   BMI 36.51 kg/m²   General: Alert, pleasant, NAD  EYES:   PERRL, EOMI, no icterus or pallor.  Conjunctivae and lids normal.   HENT:  Normocephalic.  Post nasal drainage present.  Oropharynx non-erythematous, mucous membranes moist.  Neck supple.     Respiratory: Normal respiratory effort.    Abdomen: obese  Skin: Warm, dry, no rashes.  Musculoskeletal: Gait is normal.  Moves all extremities well.    Extremities: Mild swelling of the right knee.  Otherwise no abnormalities seen..   Neurological: No tremors, sensation grossly intact, gait is normal, CN2-12 intact.  Psych:  Affect/mood is normal, judgement is good, memory is intact, grooming is appropriate.  Urinalysis: Essentially negative.  Leukocytes negative, nitrites negative, protein negative, blood negative    Assessment/Plan:     Soo was seen today for laryngitis and uti.    Diagnoses and all orders for this visit:    Post-nasal drip  -     montelukast (SINGULAIR) 10 MG Tab; Take 1 tablet by mouth every day.    She will continue with the Flonase and make sure she is taking Zyrtec on a daily basis.  We will " add Singulair and we also discussed Chrissy pot use.  She will look into this as well.  Our plan will be to follow-up in 2 to 3 weeks, sooner if needed.    Recurrent UTI  -     URINE CULTURE(NEW)  -     estradiol (ESTRACE) 0.1 MG/GM vaginal cream; Use a small amount in the affected area 2-3 times a week.  Vaginal atrophy  -     estradiol (ESTRACE) 0.1 MG/GM vaginal cream; Use a small amount in the affected area 2-3 times a week.    We will send urine for culture but I believe symptoms are most likely contributed by vaginal atrophy.  We did discuss the use of Estrace cream including potential risks.  Patient would like to proceed and see if this will help improve symptoms.  She understands that it can be 3 to 6 months before she sees improvement in her symptoms.    Chronic pain of right knee    Discussed x-ray results.  Recommend that patient follow through with orthopedic appointment.  I do believe that this is a problem that may come and go for her.        Return in about 3 weeks (around 7/19/2021), or if symptoms worsen or fail to improve, for sooner if needed. .    Please note that this dictation was created using voice recognition software. I have made every reasonable attempt to correct obvious errors, but expect that there are errors of grammar and possible content that I did not discover before finalizing note.

## 2021-07-19 ENCOUNTER — OFFICE VISIT (OUTPATIENT)
Dept: MEDICAL GROUP | Facility: CLINIC | Age: 76
End: 2021-07-19
Payer: MEDICARE

## 2021-07-19 VITALS
BODY MASS INDEX: 36.62 KG/M2 | TEMPERATURE: 97.4 F | HEART RATE: 63 BPM | DIASTOLIC BLOOD PRESSURE: 62 MMHG | HEIGHT: 62 IN | OXYGEN SATURATION: 96 % | SYSTOLIC BLOOD PRESSURE: 108 MMHG | WEIGHT: 199 LBS

## 2021-07-19 DIAGNOSIS — R10.32 LEFT LOWER QUADRANT ABDOMINAL PAIN: ICD-10-CM

## 2021-07-19 DIAGNOSIS — M25.561 CHRONIC PAIN OF RIGHT KNEE: ICD-10-CM

## 2021-07-19 DIAGNOSIS — N39.0 RECURRENT UTI: ICD-10-CM

## 2021-07-19 DIAGNOSIS — E03.9 ACQUIRED HYPOTHYROIDISM: ICD-10-CM

## 2021-07-19 DIAGNOSIS — G89.29 CHRONIC PAIN OF RIGHT KNEE: ICD-10-CM

## 2021-07-19 PROCEDURE — 99214 OFFICE O/P EST MOD 30 MIN: CPT | Performed by: PHYSICIAN ASSISTANT

## 2021-07-19 RX ORDER — LEVOTHYROXINE SODIUM 0.03 MG/1
25 TABLET ORAL
Qty: 90 TABLET | Refills: 1 | Status: SHIPPED | OUTPATIENT
Start: 2021-07-19 | End: 2021-11-11

## 2021-07-19 ASSESSMENT — FIBROSIS 4 INDEX: FIB4 SCORE: 1.63

## 2021-07-19 NOTE — PROGRESS NOTES
cc: Follow-up    Subjective:     Soo Huff is a 75 y.o. female presenting for follow-up    Patient presents to the office for follow-up.  Patient states that she is having improvement in the congestion and states that she is able to cough up the congestion. She has been using the advair only once.      Patient has not received her UTI results from her last urine and is wanting to check the results.      Patient is here to discuss her lab results.  Last set of labs do show that patient is hypothyroid and she is needing treatment at this time.    Patient states that she has an appointment to see orthopedics for her knee. She states that the pain is coming and going.  She is still having a hard time climbing stairs.      Patient states that she is having a pain on the left side.  She states that it is inside.  Patient states that it started when she picked up her dog.  She states that the pain is constant.  She denies fever or chills.  She denies problems with urination and defication.      Review of systems:  See above.   Denies any symptoms unless previously indicated.        Current Outpatient Medications:   •  levothyroxine (SYNTHROID) 25 MCG Tab, Take 1 tablet by mouth every morning on an empty stomach., Disp: 90 tablet, Rfl: 1  •  estradiol (ESTRACE) 0.1 MG/GM vaginal cream, Use a small amount in the affected area 2-3 times a week., Disp: 42.5 g, Rfl: 2  •  montelukast (SINGULAIR) 10 MG Tab, Take 1 tablet by mouth every day., Disp: 30 tablet, Rfl: 3  •  fluticasone (FLONASE) 50 MCG/ACT nasal spray, Administer 1 Spray into affected nostril(S) every day., Disp: 16 g, Rfl: 5  •  sertraline (ZOLOFT) 100 MG Tab, Take 1 tablet by mouth every day., Disp: 90 tablet, Rfl: 1  •  gabapentin (NEURONTIN) 300 MG Cap, Take 1 capsule by mouth 3 times a day., Disp: 270 capsule, Rfl: 1  •  fluticasone-salmeterol (ADVAIR DISKUS) 250-50 MCG/DOSE AEROSOL POWDER, BREATH ACTIVATED, Inhale 1 Puff 2 times a day., Disp: 1 Each, Rfl:  "9  •  sulfamethoxazole-trimethoprim (BACTRIM) 400-80 MG Tab, Take 1 Tab by mouth every day., Disp: 90 Tab, Rfl: 0  •  Pseudoeph-Doxylamine-DM-APAP (NYQUIL PO), Take  by mouth., Disp: , Rfl:   •  Glucos-Chond-Hyal Ac-Ca Fructo (MOVE FREE JOINT HEALTH ADVANCE PO), Take 2 Tabs by mouth., Disp: , Rfl:   •  Multiple Vitamins-Minerals (CENTRUM SILVER 50+WOMEN) Tab, Take 1 Tab by mouth., Disp: , Rfl:   •  diclofenac sodium 1 % Gel, Apply 1 g topically 4 times a day. (Patient not taking: Reported on 7/19/2021), Disp: 350 g, Rfl: 3  •  ketoconazole (NIZORAL) 2 % Cream, Apply a small amount  to affected area twice a day for 2 weeks. (Patient not taking: Reported on 6/10/2021), Disp: 60 g, Rfl: 1    Allergies, past medical history, past surgical history, family history, social history reviewed and updated    Objective:     Vitals: /62 (BP Location: Left arm, Patient Position: Sitting, BP Cuff Size: Large adult)   Pulse 63   Temp 36.3 °C (97.4 °F) (Temporal)   Ht 1.575 m (5' 2\")   Wt 90.3 kg (199 lb)   SpO2 96%   BMI 36.40 kg/m²   General: Alert, pleasant, NAD  EYES:   PERRL, EOMI, no icterus or pallor.  Conjunctivae and lids normal.   HENT:  Normocephalic.  External ears normal.   Neck supple.    Respiratory: Normal respiratory effort.    Abdomen: Obese.  Lipoma on left side.  No significant pain upon palpation.    Skin: Warm, dry, no rashes.  Musculoskeletal: Gait is normal.    Extremities: Normal range of motion all extremities.   Neurological: No tremors, sensation grossly intact, CN2-12 intact.  Psych:  Affect/mood is normal, judgement is good, memory is intact, grooming is appropriate.    Assessment/Plan:     Soo was seen today for congestion.    Diagnoses and all orders for this visit:    Recurrent UTI  -     URINE CULTURE(NEW); Future    Urine was not sent with previous visit.  When we repeat labs, we will obtain a urine culture to evaluate further.    Acquired hypothyroidism  -     levothyroxine " (SYNTHROID) 25 MCG Tab; Take 1 tablet by mouth every morning on an empty stomach.  -     TSH; Future  -     FREE THYROXINE; Future  We will start patient on levothyroxine  25 mcg and follow-up with labs in 2 to 3 months.    Chronic pain of right knee    Patient will keep her appointment with orthopedics.    Left lower quadrant abdominal pain  -     UC AMA/Refusal of Treatment    Discussed with patient that there may be multiple etiologies including diverticulitis and/or constipation.  I recommend patient at least have a CT scan but be seen in the ER for further evaluation.  I also recommend empiric antibiotic therapy.  Patient declines all of this.  She would like to give this time.  Advised patient of risks which she has verbalized understanding to and has signed AMA.  We will follow-up in 1 week and if symptoms worsen she is to go to the ER.          Return in about 1 week (around 7/26/2021).    Please note that this dictation was created using voice recognition software. I have made every reasonable attempt to correct obvious errors, but expect that there are errors of grammar and possible content that I did not discover before finalizing note.

## 2021-07-26 ENCOUNTER — OFFICE VISIT (OUTPATIENT)
Dept: MEDICAL GROUP | Facility: CLINIC | Age: 76
End: 2021-07-26
Payer: MEDICARE

## 2021-07-26 VITALS
OXYGEN SATURATION: 93 % | BODY MASS INDEX: 37.17 KG/M2 | WEIGHT: 202 LBS | RESPIRATION RATE: 16 BRPM | TEMPERATURE: 98 F | DIASTOLIC BLOOD PRESSURE: 66 MMHG | HEART RATE: 66 BPM | SYSTOLIC BLOOD PRESSURE: 122 MMHG | HEIGHT: 62 IN

## 2021-07-26 DIAGNOSIS — J30.2 SEASONAL ALLERGIES: ICD-10-CM

## 2021-07-26 DIAGNOSIS — R10.32 LEFT LOWER QUADRANT ABDOMINAL PAIN: ICD-10-CM

## 2021-07-26 PROBLEM — T78.40XA ALLERGIES: Status: ACTIVE | Noted: 2021-07-26

## 2021-07-26 PROCEDURE — 99213 OFFICE O/P EST LOW 20 MIN: CPT | Performed by: PHYSICIAN ASSISTANT

## 2021-07-26 RX ORDER — LORATADINE 10 MG/1
10 TABLET ORAL DAILY
Qty: 30 TABLET | Refills: 3 | Status: SHIPPED | OUTPATIENT
Start: 2021-07-26 | End: 2021-12-20

## 2021-07-26 ASSESSMENT — FIBROSIS 4 INDEX: FIB4 SCORE: 1.63

## 2021-07-26 NOTE — PROGRESS NOTES
cc:  Abdominal pain     Subjective:     Soo Huff is a 75 y.o. female presenting for abdominal pain      Patient presents to the office for abdominal pain. She states that she still has pain, but feels that it is deep inside on the left side.  She states that the pain is always there.  She feels that she is often gassy.      Patient states that she is having problems with coughing and clearing her throat.  She has been using flonase and advair.  She states that the singulair makes her nose dry.  She is usiing the flonase 1 spray each side twice a day.  She is not currently taking an antihistamine.     Review of systems:  See above.   Denies any symptoms unless previously indicated.        Current Outpatient Medications:   •  loratadine (CLARITIN) 10 MG Tab, Take 1 tablet by mouth every day., Disp: 30 tablet, Rfl: 3  •  levothyroxine (SYNTHROID) 25 MCG Tab, Take 1 tablet by mouth every morning on an empty stomach., Disp: 90 tablet, Rfl: 1  •  estradiol (ESTRACE) 0.1 MG/GM vaginal cream, Use a small amount in the affected area 2-3 times a week., Disp: 42.5 g, Rfl: 2  •  fluticasone (FLONASE) 50 MCG/ACT nasal spray, Administer 1 Spray into affected nostril(S) every day., Disp: 16 g, Rfl: 5  •  sertraline (ZOLOFT) 100 MG Tab, Take 1 tablet by mouth every day., Disp: 90 tablet, Rfl: 1  •  gabapentin (NEURONTIN) 300 MG Cap, Take 1 capsule by mouth 3 times a day., Disp: 270 capsule, Rfl: 1  •  fluticasone-salmeterol (ADVAIR DISKUS) 250-50 MCG/DOSE AEROSOL POWDER, BREATH ACTIVATED, Inhale 1 Puff 2 times a day., Disp: 1 Each, Rfl: 9  •  sulfamethoxazole-trimethoprim (BACTRIM) 400-80 MG Tab, Take 1 Tab by mouth every day., Disp: 90 Tab, Rfl: 0  •  Pseudoeph-Doxylamine-DM-APAP (NYQUIL PO), Take  by mouth., Disp: , Rfl:   •  Glucos-Chond-Hyal Ac-Ca Fructo (MOVE FREE JOINT HEALTH ADVANCE PO), Take 2 Tabs by mouth., Disp: , Rfl:   •  montelukast (SINGULAIR) 10 MG Tab, Take 1 tablet by mouth every day. (Patient not taking:  "Reported on 7/21/2021), Disp: 30 tablet, Rfl: 3  •  diclofenac sodium 1 % Gel, Apply 1 g topically 4 times a day. (Patient not taking: Reported on 7/19/2021), Disp: 350 g, Rfl: 3  •  ketoconazole (NIZORAL) 2 % Cream, Apply a small amount  to affected area twice a day for 2 weeks. (Patient not taking: Reported on 6/10/2021), Disp: 60 g, Rfl: 1  •  Multiple Vitamins-Minerals (CENTRUM SILVER 50+WOMEN) Tab, Take 1 Tab by mouth. (Patient not taking: Reported on 7/21/2021), Disp: , Rfl:     Allergies, past medical history, past surgical history, family history, social history reviewed and updated    Objective:     Vitals: /66   Pulse 66   Temp 36.7 °C (98 °F) (Temporal)   Resp 16   Ht 1.575 m (5' 2\")   Wt 91.6 kg (202 lb)   SpO2 93%   BMI 36.95 kg/m²   General: Alert, pleasant, NAD  EYES:   PERRL, EOMI, no icterus or pallor.  Conjunctivae and lids normal.   HENT:  Normocephalic.  External ears normal. Neck supple.   Frequently trying to clear her throat.  Respiratory: Normal respiratory effort.   Abdomen: Obese, patient grabs left side  Skin: Warm, dry, no rashes.  Musculoskeletal: Gait is normal.  Moves all extremities well.    Extremities: Normal range of motion all extremities..   Neurological: No tremors, sensation grossly intact, , CN2-12 intact.  Psych:  Affect/mood is normal, judgement is good, memory is intact, grooming is appropriate.    Assessment/Plan:     Soo was seen today for medication management.    Diagnoses and all orders for this visit:    Left lower quadrant abdominal pain  -     DX-ABDOMEN-3+ VIEWS; Future    Possible constipation.  She does have a colonoscopy coming up this Friday.  Will obtain an x-ray to evaluate further and if there is no significant issues seen on x-ray, we will reconsider CT.    Seasonal allergies  -     loratadine (CLARITIN) 10 MG Tab; Take 1 tablet by mouth every day.  Patient complaining of drainage down the back of her throat.  We will try Claritin to see if " this will help to dry up some of this drainage.          Return in about 2 weeks (around 8/9/2021), or if symptoms worsen or fail to improve.    Please note that this dictation was created using voice recognition software. I have made every reasonable attempt to correct obvious errors, but expect that there are errors of grammar and possible content that I did not discover before finalizing note.

## 2021-07-28 ENCOUNTER — APPOINTMENT (OUTPATIENT)
Dept: URGENT CARE | Facility: CLINIC | Age: 76
End: 2021-07-28
Payer: MEDICARE

## 2021-07-28 ENCOUNTER — APPOINTMENT (OUTPATIENT)
Dept: RADIOLOGY | Facility: IMAGING CENTER | Age: 76
End: 2021-07-28
Attending: PHYSICIAN ASSISTANT
Payer: MEDICARE

## 2021-07-28 DIAGNOSIS — R10.32 LEFT LOWER QUADRANT ABDOMINAL PAIN: ICD-10-CM

## 2021-07-28 PROCEDURE — 74021 RADEX ABDOMEN 3+ VIEWS: CPT | Mod: TC | Performed by: FAMILY MEDICINE

## 2021-10-06 DIAGNOSIS — F41.9 ANXIETY: ICD-10-CM

## 2021-10-06 DIAGNOSIS — F32.0 CURRENT MILD EPISODE OF MAJOR DEPRESSIVE DISORDER WITHOUT PRIOR EPISODE (HCC): ICD-10-CM

## 2021-10-06 RX ORDER — SERTRALINE HYDROCHLORIDE 100 MG/1
TABLET, FILM COATED ORAL
Qty: 90 TABLET | Refills: 0 | Status: SHIPPED | OUTPATIENT
Start: 2021-10-06 | End: 2021-11-11

## 2021-10-06 NOTE — TELEPHONE ENCOUNTER
Was the patient seen in the last year in this department? Yes    Does patient have an active prescription for medications requested? Yes    Received Request Via: Pharmacy    Hospital Outpatient Visit on 03/30/2021   Component Date Value   • Sodium 03/30/2021 142    • Potassium 03/30/2021 4.7    • Chloride 03/30/2021 106    • Co2 03/30/2021 27    • Anion Gap 03/30/2021 9.0    • Glucose 03/30/2021 91    • Bun 03/30/2021 16    • Creatinine 03/30/2021 0.75    • Calcium 03/30/2021 9.2    • AST(SGOT) 03/30/2021 23    • ALT(SGPT) 03/30/2021 21    • Alkaline Phosphatase 03/30/2021 82    • Total Bilirubin 03/30/2021 0.5    • Albumin 03/30/2021 4.1    • Total Protein 03/30/2021 7.1    • Globulin 03/30/2021 3.0    • A-G Ratio 03/30/2021 1.4    • WBC 03/30/2021 6.5    • RBC 03/30/2021 4.78    • Hemoglobin 03/30/2021 14.1    • Hematocrit 03/30/2021 45.0    • MCV 03/30/2021 94.1    • MCH 03/30/2021 29.5    • MCHC 03/30/2021 31.3*   • RDW 03/30/2021 47.3    • Platelet Count 03/30/2021 231    • MPV 03/30/2021 12.1    • Neutrophils-Polys 03/30/2021 51.50    • Lymphocytes 03/30/2021 27.90    • Monocytes 03/30/2021 7.70    • Eosinophils 03/30/2021 11.60*   • Basophils 03/30/2021 0.80    • Immature Granulocytes 03/30/2021 0.50    • Nucleated RBC 03/30/2021 0.00    • Neutrophils (Absolute) 03/30/2021 3.33    • Lymphs (Absolute) 03/30/2021 1.80    • Monos (Absolute) 03/30/2021 0.50    • Eos (Absolute) 03/30/2021 0.75*   • Baso (Absolute) 03/30/2021 0.05    • Immature Granulocytes (a* 03/30/2021 0.03    • NRBC (Absolute) 03/30/2021 0.00    • Cholesterol,Tot 03/30/2021 225*   • Triglycerides 03/30/2021 249*   • HDL 03/30/2021 40    • LDL 03/30/2021 135*   • Iron 03/30/2021 101    • Total Iron Binding 03/30/2021 245*   • Unsat Iron Binding 03/30/2021 144    • % Saturation 03/30/2021 41    • Vitamin B12 -True Cobala* 03/30/2021 285    • Folate -Folic Acid 03/30/2021 12.6    • Free T-4 03/30/2021 0.81*   • TSH 03/30/2021 5.520*   • GFR If   03/30/2021 >60    • GFR If Non  Ameri* 03/30/2021 >60    Hospital Outpatient Visit on 02/03/2021   Component Date Value   • Significant Indicator 02/03/2021 NEG    • Source 02/03/2021 UR    • Site 02/03/2021 UNKNOWN    • Culture Result 02/03/2021 Usual skin soo <10,000 cfu/mL    Office Visit on 02/03/2021   Component Date Value   • POC Color 02/03/2021 yellow    • POC Appearance 02/03/2021 clear    • POC Leukocyte Esterase 02/03/2021 NEG    • POC Nitrites 02/03/2021 NEG    • POC Urobiligen 02/03/2021 0.2    • POC Protein 02/03/2021 NEG    • POC Urine PH 02/03/2021 5.0    • POC Blood 02/03/2021 NEG    • POC Specific Gravity 02/03/2021 1.030    • POC Ketones 02/03/2021 NEG    • POC Bilirubin 02/03/2021 NEG    • POC Glucose 02/03/2021 NEG    Hospital Outpatient Visit on 11/09/2020   Component Date Value   • Color 11/09/2020 YELLOW    • Character 11/09/2020 SL CLOUDY*   • Specific Gravity 11/09/2020 >=1.030*   • Ph 11/09/2020 5.5    • Glucose 11/09/2020 NEGATIVE    • Ketones 11/09/2020 NEGATIVE    • Protein 11/09/2020 NEGATIVE    • Bilirubin 11/09/2020 NEGATIVE    • Urobilinogen, Urine 11/09/2020 0.2    • Nitrite 11/09/2020 NEGATIVE    • Leukocyte Esterase 11/09/2020 SMALL*   • Occult Blood 11/09/2020 NEGATIVE    • Culture Indicated 11/09/2020 Yes    • WBC 11/09/2020 20-50*   • RBC 11/09/2020 None Seen    • Bacteria 11/09/2020 25-50*   • Epithelial Cells 11/09/2020 6-10*   • Mucous Threads 11/09/2020 1+    • Urine Other 11/09/2020 See Below    • Significant Indicator 11/09/2020 POS*   • Source 11/09/2020 UR    • Site 11/09/2020 -    • Urine Culture 11/09/2020 -*   • Urine Culture 11/09/2020 *                    Value:Escherichia coli  >100,000 cfu/mL     ]

## 2021-11-15 ENCOUNTER — OFFICE VISIT (OUTPATIENT)
Dept: MEDICAL GROUP | Facility: CLINIC | Age: 76
End: 2021-11-15
Payer: MEDICARE

## 2021-11-15 VITALS
RESPIRATION RATE: 16 BRPM | HEART RATE: 68 BPM | HEIGHT: 62 IN | BODY MASS INDEX: 36.99 KG/M2 | OXYGEN SATURATION: 92 % | WEIGHT: 201 LBS | TEMPERATURE: 97.6 F | SYSTOLIC BLOOD PRESSURE: 102 MMHG | DIASTOLIC BLOOD PRESSURE: 62 MMHG

## 2021-11-15 DIAGNOSIS — L98.9 SKIN LESION: ICD-10-CM

## 2021-11-15 DIAGNOSIS — M54.2 NECK PAIN: ICD-10-CM

## 2021-11-15 DIAGNOSIS — M79.642 PAIN IN BOTH HANDS: ICD-10-CM

## 2021-11-15 DIAGNOSIS — G89.29 CHRONIC PAIN OF BOTH SHOULDERS: ICD-10-CM

## 2021-11-15 DIAGNOSIS — M79.641 PAIN IN BOTH HANDS: ICD-10-CM

## 2021-11-15 DIAGNOSIS — M25.511 CHRONIC PAIN OF BOTH SHOULDERS: ICD-10-CM

## 2021-11-15 DIAGNOSIS — M25.512 CHRONIC PAIN OF BOTH SHOULDERS: ICD-10-CM

## 2021-11-15 DIAGNOSIS — M19.90 ARTHRITIS: ICD-10-CM

## 2021-11-15 PROCEDURE — 99214 OFFICE O/P EST MOD 30 MIN: CPT | Performed by: PHYSICIAN ASSISTANT

## 2021-11-15 RX ORDER — CELECOXIB 200 MG/1
200 CAPSULE ORAL DAILY
Qty: 30 CAPSULE | Refills: 3 | Status: SHIPPED | OUTPATIENT
Start: 2021-11-15 | End: 2021-12-13 | Stop reason: SDUPTHER

## 2021-11-15 ASSESSMENT — FIBROSIS 4 INDEX: FIB4 SCORE: 1.65

## 2021-11-16 NOTE — PROGRESS NOTES
cc:  Joint pain    Subjective:     Soo Huff is a 76 y.o. female presenting for joint pain       Patient presents to the office for joint pain.  Patient states that aroun the age of 50 she had a fall and injured her right shoulder.  Several years later she did see an orthopedic physician who recommended surgery.  She was not able to afford surgery and so put it off.  She was told that she had scar tissue in the right shoulder.  Now she is favoring the left shoulder which is causing bilateral neck and shoulder pain.   She is not sure if she had an MRI but acknowledges an xray.  She does have crepitus in the shoulder.  She states that it is painful to lay on her shoulder and will wake her up in the middle of the night.       Patient indicates that she has been having bilateral hand pain with increased use.  She does have arthritis.    Review of systems:  See above.   Denies any symptoms unless previously indicated.        Current Outpatient Medications:   •  celecoxib (CELEBREX) 200 MG Cap, Take 1 Capsule by mouth every day., Disp: 30 Capsule, Rfl: 3  •  diclofenac sodium (VOLTAREN) 1 % Gel, Apply 1 g topically 4 times a day., Disp: 350 g, Rfl: 3  •  sertraline (ZOLOFT) 100 MG Tab, TAKE ONE TABLET BY MOUTH DAILY, Disp: 90 Tablet, Rfl: 0  •  levothyroxine (SYNTHROID) 25 MCG Tab, TAKE ONE TABLET BY MOUTH EVERY MORNING ON AN EMPTY STOMACH, Disp: 90 Tablet, Rfl: 0  •  loratadine (CLARITIN) 10 MG Tab, Take 1 tablet by mouth every day., Disp: 30 tablet, Rfl: 3  •  fluticasone (FLONASE) 50 MCG/ACT nasal spray, Administer 1 Spray into affected nostril(S) every day., Disp: 16 g, Rfl: 5  •  gabapentin (NEURONTIN) 300 MG Cap, Take 1 capsule by mouth 3 times a day., Disp: 270 capsule, Rfl: 1  •  fluticasone-salmeterol (ADVAIR DISKUS) 250-50 MCG/DOSE AEROSOL POWDER, BREATH ACTIVATED, Inhale 1 Puff 2 times a day., Disp: 1 Each, Rfl: 9  •  Pseudoeph-Doxylamine-DM-APAP (NYQUIL PO), Take  by mouth., Disp: , Rfl:   •   "Glucos-Chond-Hyal Ac-Ca Fructo (MOVE FREE JOINT HEALTH ADVANCE PO), Take 2 Tabs by mouth., Disp: , Rfl:   •  sulfamethoxazole-trimethoprim (BACTRIM) 400-80 MG Tab, Take 1 Tab by mouth every day., Disp: 90 Tab, Rfl: 0    Allergies, past medical history, past surgical history, family history, social history reviewed and updated    Objective:     Vitals: /62 (BP Location: Left arm, Patient Position: Sitting, BP Cuff Size: Large adult)   Pulse 68   Temp 36.4 °C (97.6 °F) (Temporal)   Resp 16   Ht 1.575 m (5' 2\") Comment: stated by pt  Wt 91.2 kg (201 lb)   SpO2 92%   BMI 36.76 kg/m²   General: Alert, pleasant, NAD  EYES:   PERRL, EOMI, no icterus or pallor.  Conjunctivae and lids normal.   HENT:  Normocephalic.  External ears normal. Neck supple.     Respiratory: Normal respiratory effort.    Abdomen: obese  Skin: Warm, dry, no rashes.  Hyperpigmented lesion between neck and chest on right side.  It is raised and fleshy but darkly hyperpigmented.  Musculoskeletal: Gait is normal.  Moves all extremities well.    Extremities: Negative active rotator cuff test.  Positive passive rotator cuff test.  Pain with palpation of right shoulder.  Unable to perform apprehension sign.  Positive impingement sign..  Right shoulder sits lower than the left.  Neurological: No tremors, sensation grossly intact, gait is normal, CN2-12 intact.  Psych:  Affect/mood is normal, judgement is good, memory is intact, grooming is appropriate.    Assessment/Plan:     Soo was seen today for arthritis.    Diagnoses and all orders for this visit:    Chronic pain of both shoulders  -     DX-SHOULDER 2+ RIGHT; Future  -     DX-SHOULDER 2+ LEFT; Future  -     MR-SHOULDER-W/O RIGHT; Future  -     celecoxib (CELEBREX) 200 MG Cap; Take 1 Capsule by mouth every day.  -     Referral to Orthopedics  Neck pain  -     DX-CERVICAL SPINE-2 OR 3 VIEWS; Future  -     celecoxib (CELEBREX) 200 MG Cap; Take 1 Capsule by mouth every day.  -     Referral to " Orthopedics    Pain in left shoulder neck may be compensating for the right shoulder pain.  My concern is patient may actually have a rotator cuff tear.  Will obtain x-rays of both shoulders as well as the neck and refer to orthopedics for consideration of injection due to patient's pain level.  However will obtain an MRI of the right shoulder to evaluate for possible rotator cuff tear.  Plan is to follow-up in about 4 to 8 weeks sooner if needed.  We will try Celebrex to see if this will help symptoms.    Arthritis  -     diclofenac sodium (VOLTAREN) 1 % Gel; Apply 1 g topically 4 times a day.  Pain in both hands    Patient does use diclofenac.  We will add Celebrex and see if this will help symptoms.    Skin lesion  -     Referral to Dermatology    Referral to dermatology submitted.        Return in about 4 weeks (around 12/13/2021).    Please note that this dictation was created using voice recognition software. I have made every reasonable attempt to correct obvious errors, but expect that there are errors of grammar and possible content that I did not discover before finalizing note.

## 2021-11-18 ENCOUNTER — APPOINTMENT (OUTPATIENT)
Dept: RADIOLOGY | Facility: IMAGING CENTER | Age: 76
End: 2021-11-18
Attending: PHYSICIAN ASSISTANT
Payer: MEDICARE

## 2021-11-18 ENCOUNTER — APPOINTMENT (OUTPATIENT)
Dept: URGENT CARE | Facility: PHYSICIAN GROUP | Age: 76
End: 2021-11-18
Payer: MEDICARE

## 2021-11-18 DIAGNOSIS — M25.512 CHRONIC PAIN OF BOTH SHOULDERS: ICD-10-CM

## 2021-11-18 DIAGNOSIS — G89.29 CHRONIC PAIN OF BOTH SHOULDERS: ICD-10-CM

## 2021-11-18 DIAGNOSIS — M54.2 NECK PAIN: ICD-10-CM

## 2021-11-18 DIAGNOSIS — M25.511 CHRONIC PAIN OF BOTH SHOULDERS: ICD-10-CM

## 2021-11-18 PROCEDURE — 73030 X-RAY EXAM OF SHOULDER: CPT | Mod: TC,FY,RT | Performed by: PHYSICIAN ASSISTANT

## 2021-11-18 PROCEDURE — 72040 X-RAY EXAM NECK SPINE 2-3 VW: CPT | Mod: TC,FY | Performed by: PHYSICIAN ASSISTANT

## 2021-11-18 PROCEDURE — 73030 X-RAY EXAM OF SHOULDER: CPT | Mod: TC,FY,LT | Performed by: PHYSICIAN ASSISTANT

## 2021-12-13 ENCOUNTER — TELEMEDICINE (OUTPATIENT)
Dept: MEDICAL GROUP | Facility: CLINIC | Age: 76
End: 2021-12-13
Payer: MEDICARE

## 2021-12-13 VITALS — WEIGHT: 201 LBS | HEIGHT: 62 IN | BODY MASS INDEX: 36.99 KG/M2

## 2021-12-13 DIAGNOSIS — M25.511 CHRONIC PAIN OF BOTH SHOULDERS: ICD-10-CM

## 2021-12-13 DIAGNOSIS — M25.512 CHRONIC PAIN OF BOTH SHOULDERS: ICD-10-CM

## 2021-12-13 DIAGNOSIS — G89.29 CHRONIC PAIN OF BOTH SHOULDERS: ICD-10-CM

## 2021-12-13 DIAGNOSIS — M25.511 CHRONIC RIGHT SHOULDER PAIN: ICD-10-CM

## 2021-12-13 DIAGNOSIS — G89.29 CHRONIC RIGHT SHOULDER PAIN: ICD-10-CM

## 2021-12-13 DIAGNOSIS — M54.2 NECK PAIN: ICD-10-CM

## 2021-12-13 PROCEDURE — 99213 OFFICE O/P EST LOW 20 MIN: CPT | Mod: 95,CR | Performed by: PHYSICIAN ASSISTANT

## 2021-12-13 RX ORDER — CELECOXIB 200 MG/1
200 CAPSULE ORAL DAILY
Qty: 90 CAPSULE | Refills: 0 | Status: SHIPPED | OUTPATIENT
Start: 2021-12-13 | End: 2022-06-21

## 2021-12-13 ASSESSMENT — FIBROSIS 4 INDEX: FIB4 SCORE: 1.65

## 2021-12-13 NOTE — PROGRESS NOTES
Virtual Visit: Established Patient   This visit was conducted via Zoom using secure and encrypted videoconferencing technology.   The patient was in a private location in the state of [place].    The patient's identity was confirmed and verbal consent was obtained for this virtual visit.    Subjective:   CC:   Chief Complaint   Patient presents with   • Results     xrays     Soo Huff is a 76 y.o. female presenting for evaluation and management of:    Shoulder pain.  She states that the celebrex is lasting but does not last through the night.  She states that about 1-2 in the morning she wakes up in pain.  She states that she did see orthopedics.  She saw the PA who recommended the injection.  She states that she has not seen any change with the injection.      ROS   Denies any other symptoms unless previously indicated.    Current medicines (including changes today)  Current Outpatient Medications   Medication Sig Dispense Refill   • celecoxib (CELEBREX) 200 MG Cap Take 1 Capsule by mouth every day. 90 Capsule 0   • gabapentin (NEURONTIN) 300 MG Cap TAKE ONE CAPSULE BY MOUTH THREE TIMES A  Capsule 0   • diclofenac sodium (VOLTAREN) 1 % Gel Apply 1 g topically 4 times a day. 350 g 3   • sertraline (ZOLOFT) 100 MG Tab TAKE ONE TABLET BY MOUTH DAILY 90 Tablet 0   • levothyroxine (SYNTHROID) 25 MCG Tab TAKE ONE TABLET BY MOUTH EVERY MORNING ON AN EMPTY STOMACH 90 Tablet 0   • loratadine (CLARITIN) 10 MG Tab Take 1 tablet by mouth every day. 30 tablet 3   • fluticasone (FLONASE) 50 MCG/ACT nasal spray Administer 1 Spray into affected nostril(S) every day. 16 g 5   • fluticasone-salmeterol (ADVAIR DISKUS) 250-50 MCG/DOSE AEROSOL POWDER, BREATH ACTIVATED Inhale 1 Puff 2 times a day. 1 Each 9   • sulfamethoxazole-trimethoprim (BACTRIM) 400-80 MG Tab Take 1 Tab by mouth every day. 90 Tab 0   • Pseudoeph-Doxylamine-DM-APAP (NYQUIL PO) Take  by mouth.     • Glucos-Chond-Hyal Ac-Ca Fructo (MOVE FREE JOINT HEALTH  "ADVANCE PO) Take 2 Tabs by mouth.       No current facility-administered medications for this visit.       Patient Active Problem List    Diagnosis Date Noted   • Skin lesion 11/15/2021   • Chronic right shoulder pain 11/15/2021   • Neck pain 11/15/2021   • Pain in both hands 11/15/2021   • Allergies 07/26/2021   • Seasonal allergies 07/26/2021   • Acquired hypothyroidism 07/19/2021   • Left lower quadrant abdominal pain 07/19/2021   • Vaginal atrophy 06/28/2021   • Post-nasal drip 06/28/2021   • Chronic pain of right knee 06/28/2021   • Functional diarrhea 04/01/2021   • Plantar fasciitis 04/01/2021   • Other hyperlipidemia 02/16/2021   • Other fatigue 02/16/2021   • Iron deficiency anemia 02/16/2021   • Tinea corporis 02/03/2021   • Mild persistent asthma without complication 01/04/2021   • Neuropathy 01/04/2021   • Healthcare maintenance 01/04/2021   • Current episode of major depressive disorder without prior episode 01/04/2021   • Anxiety 01/04/2021   • Need for vaccination 01/04/2021   • Arthritis 01/04/2021   • Recurrent UTI 10/09/2019   • Frequency of micturition 10/09/2019   • Dysuria 10/09/2019        Objective:   Ht 1.575 m (5' 2\") Comment: obtained from chart  Wt 91.2 kg (201 lb) Comment: obtained from chart  BMI 36.76 kg/m²     Physical Exam:  Constitutional: Alert, no distress, well-groomed.  Skin: No rashes in visible areas.  Eye: Round. Conjunctiva clear, lids normal. No icterus.   ENMT: Lips pink without lesions, good dentition, moist mucous membranes. Phonation normal.  Neck: No masses, no thyromegaly. Moves freely without pain.  Respiratory: Unlabored respiratory effort, no cough or audible wheeze  Psych: Alert and oriented x3, normal affect and mood.     Assessment and Plan:   The following treatment plan was discussed:     1. Chronic right shoulder pain  - MR-SHOULDER-W/O RIGHT; Future  - Referral to Physical Therapy    2. Chronic pain of both shoulders  - celecoxib (CELEBREX) 200 MG Cap; Take " 1 Capsule by mouth every day.  Dispense: 90 Capsule; Refill: 0    3. Neck pain  - celecoxib (CELEBREX) 200 MG Cap; Take 1 Capsule by mouth every day.  Dispense: 90 Capsule; Refill: 0      I am concerned that patient continues to have pain even after the injection.  I have advised her to contact orthopedics and let them know that the injection has not taken effect.  She indicates that orthopedics may have put in a referral for physical therapy and an MRI of the shoulder but I have placed these as well just  in case.  We will follow-up with test results and I have refilled patient's Celebrex.  We will have her take it in the evening and see if this will help to control her symptoms better.    As of note, patient will have her mid DuoNeb booster today.  I did advise her to take Tylenol 1 hour before the injection, every 6 hours as needed after and to drink 16 ounces of water before she was given the booster.    Follow-up: No follow-ups on file.

## 2021-12-30 ENCOUNTER — HOSPITAL ENCOUNTER (OUTPATIENT)
Dept: RADIOLOGY | Facility: MEDICAL CENTER | Age: 76
End: 2021-12-30
Attending: PHYSICIAN ASSISTANT
Payer: MEDICARE

## 2021-12-30 DIAGNOSIS — M25.511 CHRONIC RIGHT SHOULDER PAIN: ICD-10-CM

## 2021-12-30 DIAGNOSIS — G89.29 CHRONIC RIGHT SHOULDER PAIN: ICD-10-CM

## 2021-12-30 PROCEDURE — 73221 MRI JOINT UPR EXTREM W/O DYE: CPT | Mod: RT,ME

## 2022-01-12 DIAGNOSIS — N39.0 RECURRENT UTI: ICD-10-CM

## 2022-01-13 RX ORDER — SULFAMETHOXAZOLE AND TRIMETHOPRIM 400; 80 MG/1; MG/1
1 TABLET ORAL DAILY
Qty: 90 TABLET | Refills: 0 | Status: SHIPPED | OUTPATIENT
Start: 2022-01-13 | End: 2022-04-26 | Stop reason: SDUPTHER

## 2022-01-13 NOTE — TELEPHONE ENCOUNTER
Was the patient seen in the last year in this department? 12/13/21    Does patient have an active prescription for medications requested? Yes    Received Request Via: Patient LVM requesting    Hospital Outpatient Visit on 03/30/2021   Component Date Value   • Sodium 03/30/2021 142    • Potassium 03/30/2021 4.7    • Chloride 03/30/2021 106    • Co2 03/30/2021 27    • Anion Gap 03/30/2021 9.0    • Glucose 03/30/2021 91    • Bun 03/30/2021 16    • Creatinine 03/30/2021 0.75    • Calcium 03/30/2021 9.2    • AST(SGOT) 03/30/2021 23    • ALT(SGPT) 03/30/2021 21    • Alkaline Phosphatase 03/30/2021 82    • Total Bilirubin 03/30/2021 0.5    • Albumin 03/30/2021 4.1    • Total Protein 03/30/2021 7.1    • Globulin 03/30/2021 3.0    • A-G Ratio 03/30/2021 1.4    • WBC 03/30/2021 6.5    • RBC 03/30/2021 4.78    • Hemoglobin 03/30/2021 14.1    • Hematocrit 03/30/2021 45.0    • MCV 03/30/2021 94.1    • MCH 03/30/2021 29.5    • MCHC 03/30/2021 31.3*   • RDW 03/30/2021 47.3    • Platelet Count 03/30/2021 231    • MPV 03/30/2021 12.1    • Neutrophils-Polys 03/30/2021 51.50    • Lymphocytes 03/30/2021 27.90    • Monocytes 03/30/2021 7.70    • Eosinophils 03/30/2021 11.60*   • Basophils 03/30/2021 0.80    • Immature Granulocytes 03/30/2021 0.50    • Nucleated RBC 03/30/2021 0.00    • Neutrophils (Absolute) 03/30/2021 3.33    • Lymphs (Absolute) 03/30/2021 1.80    • Monos (Absolute) 03/30/2021 0.50    • Eos (Absolute) 03/30/2021 0.75*   • Baso (Absolute) 03/30/2021 0.05    • Immature Granulocytes (a* 03/30/2021 0.03    • NRBC (Absolute) 03/30/2021 0.00    • Cholesterol,Tot 03/30/2021 225*   • Triglycerides 03/30/2021 249*   • HDL 03/30/2021 40    • LDL 03/30/2021 135*   • Iron 03/30/2021 101    • Total Iron Binding 03/30/2021 245*   • Unsat Iron Binding 03/30/2021 144    • % Saturation 03/30/2021 41    • Vitamin B12 -True Cobala* 03/30/2021 285    • Folate -Folic Acid 03/30/2021 12.6    • Free T-4 03/30/2021 0.81*   • TSH 03/30/2021  5.520*   • GFR If  03/30/2021 >60    • GFR If Non  Ameri* 03/30/2021 >60    Hospital Outpatient Visit on 02/03/2021   Component Date Value   • Significant Indicator 02/03/2021 NEG    • Source 02/03/2021 UR    • Site 02/03/2021 UNKNOWN    • Culture Result 02/03/2021 Usual skin soo <10,000 cfu/mL    Office Visit on 02/03/2021   Component Date Value   • POC Color 02/03/2021 yellow    • POC Appearance 02/03/2021 clear    • POC Leukocyte Esterase 02/03/2021 NEG    • POC Nitrites 02/03/2021 NEG    • POC Urobiligen 02/03/2021 0.2    • POC Protein 02/03/2021 NEG    • POC Urine PH 02/03/2021 5.0    • POC Blood 02/03/2021 NEG    • POC Specific Gravity 02/03/2021 1.030    • POC Ketones 02/03/2021 NEG    • POC Bilirubin 02/03/2021 NEG    • POC Glucose 02/03/2021 NEG

## 2022-01-25 DIAGNOSIS — R09.82 POST-NASAL DRIP: ICD-10-CM

## 2022-01-25 RX ORDER — MONTELUKAST SODIUM 10 MG/1
TABLET ORAL
Qty: 30 TABLET | Refills: 3 | Status: SHIPPED | OUTPATIENT
Start: 2022-01-25 | End: 2023-04-06 | Stop reason: SDUPTHER

## 2022-02-08 ENCOUNTER — OFFICE VISIT (OUTPATIENT)
Dept: MEDICAL GROUP | Facility: CLINIC | Age: 77
End: 2022-02-08
Payer: MEDICARE

## 2022-02-08 VITALS
HEART RATE: 83 BPM | BODY MASS INDEX: 36.99 KG/M2 | TEMPERATURE: 98.4 F | HEIGHT: 62 IN | SYSTOLIC BLOOD PRESSURE: 128 MMHG | OXYGEN SATURATION: 96 % | DIASTOLIC BLOOD PRESSURE: 76 MMHG | WEIGHT: 201 LBS

## 2022-02-08 DIAGNOSIS — M12.811 ROTATOR CUFF TEAR ARTHROPATHY OF RIGHT SHOULDER: ICD-10-CM

## 2022-02-08 DIAGNOSIS — G89.29 CHRONIC RIGHT SHOULDER PAIN: ICD-10-CM

## 2022-02-08 DIAGNOSIS — M75.101 ROTATOR CUFF TEAR ARTHROPATHY OF RIGHT SHOULDER: ICD-10-CM

## 2022-02-08 DIAGNOSIS — M25.511 CHRONIC RIGHT SHOULDER PAIN: ICD-10-CM

## 2022-02-08 DIAGNOSIS — N30.90 CYSTITIS: ICD-10-CM

## 2022-02-08 PROCEDURE — 99213 OFFICE O/P EST LOW 20 MIN: CPT | Performed by: PHYSICIAN ASSISTANT

## 2022-02-08 ASSESSMENT — FIBROSIS 4 INDEX: FIB4 SCORE: 1.65

## 2022-02-08 ASSESSMENT — PATIENT HEALTH QUESTIONNAIRE - PHQ9: CLINICAL INTERPRETATION OF PHQ2 SCORE: 0

## 2022-02-08 NOTE — PROGRESS NOTES
cc:  Follow up MRI    Subjective:     Soo Huff is a 76 y.o. female presenting for follow up MRI      Patient presents to the office for follow up MRI.   Patient states that she has seen Dr. Sawyer and that she was given an injection which helped with the pain.  She is not comfortable with the orthopedic group and would like to hold off at this time.  She is interested in a second opinion but she would like to try physical therapy first.  She indicates that surgery is not really out of route she wants to take if she is able to avoid it.    Patient states that she has been having an increased urge to urinate and it seems to occur with coffee.  She is concerned that she may have a UTI.  She is also acknowledge some dietary changes, trying to eat a healthier lifestyle and does acknowledge eating more foods that are tomato based citrus-based and sometimes some spicy foods.    Review of systems:  See above.   Denies any symptoms unless previously indicated.        Current Outpatient Medications:   •  montelukast (SINGULAIR) 10 MG Tab, TAKE ONE TABLET BY MOUTH DAILY, Disp: 30 Tablet, Rfl: 3  •  sulfamethoxazole-trimethoprim (BACTRIM) 400-80 MG Tab, Take 1 Tablet by mouth every day., Disp: 90 Tablet, Rfl: 0  •  loratadine (CLARITIN) 10 MG Tab, TAKE ONE TABLET BY MOUTH DAILY, Disp: 30 Tablet, Rfl: 6  •  celecoxib (CELEBREX) 200 MG Cap, Take 1 Capsule by mouth every day., Disp: 90 Capsule, Rfl: 0  •  gabapentin (NEURONTIN) 300 MG Cap, TAKE ONE CAPSULE BY MOUTH THREE TIMES A DAY, Disp: 270 Capsule, Rfl: 0  •  diclofenac sodium (VOLTAREN) 1 % Gel, Apply 1 g topically 4 times a day., Disp: 350 g, Rfl: 3  •  sertraline (ZOLOFT) 100 MG Tab, TAKE ONE TABLET BY MOUTH DAILY, Disp: 90 Tablet, Rfl: 0  •  levothyroxine (SYNTHROID) 25 MCG Tab, TAKE ONE TABLET BY MOUTH EVERY MORNING ON AN EMPTY STOMACH, Disp: 90 Tablet, Rfl: 0  •  fluticasone (FLONASE) 50 MCG/ACT nasal spray, Administer 1 Spray into affected nostril(S) every day.,  "Disp: 16 g, Rfl: 5  •  fluticasone-salmeterol (ADVAIR DISKUS) 250-50 MCG/DOSE AEROSOL POWDER, BREATH ACTIVATED, Inhale 1 Puff 2 times a day., Disp: 1 Each, Rfl: 9  •  Pseudoeph-Doxylamine-DM-APAP (NYQUIL PO), Take  by mouth., Disp: , Rfl:   •  Glucos-Chond-Hyal Ac-Ca Fructo (MOVE FREE JOINT HEALTH ADVANCE PO), Take 2 Tabs by mouth., Disp: , Rfl:     Allergies, past medical history, past surgical history, family history, social history reviewed and updated    Objective:     Vitals: /76 (BP Location: Left arm, Patient Position: Sitting, BP Cuff Size: Adult long)   Pulse 83   Temp 36.9 °C (98.4 °F) (Temporal)   Ht 1.562 m (5' 1.5\")   Wt 91.2 kg (201 lb)   SpO2 96%   BMI 37.36 kg/m²   General: Alert, pleasant, NAD  EYES:   PERRL, EOMI, no icterus or pallor.  Conjunctivae and lids normal.   HENT:  Normocephalic.  External ears normal.   Neck supple.    Respiratory: Normal respiratory effort.    Abdomen: obese  Skin: Warm, dry, no rashes.  Musculoskeletal: Gait is normal.  Moves all extremities well.    Extremities: normal range of motion all extremities.   Neurological: No tremors, sensation grossly intact,  CN2-12 intact.  Psych:  Affect/mood is normal, judgement is good, memory is intact, grooming is appropriate.  Urinalysis:  Negative.      Assessment/Plan:     Soo was seen today for lab results and enuresis.    Diagnoses and all orders for this visit:    Cystitis    Patient will work on improved diet.  She will work on avoiding caffeine, tomato-based foods, spicy foods and citrus foods.  We will follow-up in approximately 6 weeks.  If there is no significant change in symptoms, may need to consider medication such as oxybutynin.    Chronic right shoulder pain  Rotator cuff tear arthropathy of right shoulder    Patient is not wanting to proceed with surgery.  She would like to try physical therapy.  I have printed out a report so that patient can take this to physical therapy and she will call to schedule " an appointment.  If she chooses to see orthopedics, we will submit a second opinion referral for further evaluation.  Again we will follow-up in approximately 6 weeks.        Return in about 4 weeks (around 3/8/2022), or if symptoms worsen or fail to improve, for 4-6 weeks..    Please note that this dictation was created using voice recognition software. I have made every reasonable attempt to correct obvious errors, but expect that there are errors of grammar and possible content that I did not discover before finalizing note.

## 2022-03-22 ENCOUNTER — PATIENT MESSAGE (OUTPATIENT)
Dept: MEDICAL GROUP | Facility: CLINIC | Age: 77
End: 2022-03-22
Payer: MEDICARE

## 2022-03-22 DIAGNOSIS — M75.101 ROTATOR CUFF TEAR ARTHROPATHY OF RIGHT SHOULDER: ICD-10-CM

## 2022-03-22 DIAGNOSIS — M12.811 ROTATOR CUFF TEAR ARTHROPATHY OF RIGHT SHOULDER: ICD-10-CM

## 2022-04-26 ENCOUNTER — OFFICE VISIT (OUTPATIENT)
Dept: MEDICAL GROUP | Facility: CLINIC | Age: 77
End: 2022-04-26
Payer: MEDICARE

## 2022-04-26 VITALS
HEART RATE: 69 BPM | TEMPERATURE: 98.3 F | OXYGEN SATURATION: 93 % | RESPIRATION RATE: 16 BRPM | DIASTOLIC BLOOD PRESSURE: 78 MMHG | BODY MASS INDEX: 36.29 KG/M2 | WEIGHT: 197.2 LBS | SYSTOLIC BLOOD PRESSURE: 118 MMHG | HEIGHT: 62 IN

## 2022-04-26 DIAGNOSIS — Z23 NEED FOR VACCINATION: ICD-10-CM

## 2022-04-26 DIAGNOSIS — M12.811 ROTATOR CUFF TEAR ARTHROPATHY OF RIGHT SHOULDER: ICD-10-CM

## 2022-04-26 DIAGNOSIS — E03.9 ACQUIRED HYPOTHYROIDISM: ICD-10-CM

## 2022-04-26 DIAGNOSIS — G62.9 NEUROPATHY: ICD-10-CM

## 2022-04-26 DIAGNOSIS — E78.49 OTHER HYPERLIPIDEMIA: ICD-10-CM

## 2022-04-26 DIAGNOSIS — Z11.59 NEED FOR HEPATITIS C SCREENING TEST: ICD-10-CM

## 2022-04-26 DIAGNOSIS — M75.101 ROTATOR CUFF TEAR ARTHROPATHY OF RIGHT SHOULDER: ICD-10-CM

## 2022-04-26 DIAGNOSIS — F32.0 CURRENT MILD EPISODE OF MAJOR DEPRESSIVE DISORDER WITHOUT PRIOR EPISODE (HCC): ICD-10-CM

## 2022-04-26 DIAGNOSIS — E66.9 OBESITY (BMI 30-39.9): ICD-10-CM

## 2022-04-26 DIAGNOSIS — N39.0 RECURRENT UTI: ICD-10-CM

## 2022-04-26 PROBLEM — Z00.00 HEALTHCARE MAINTENANCE: Status: RESOLVED | Noted: 2021-01-04 | Resolved: 2022-04-26

## 2022-04-26 PROCEDURE — G0009 ADMIN PNEUMOCOCCAL VACCINE: HCPCS | Performed by: PHYSICIAN ASSISTANT

## 2022-04-26 PROCEDURE — 90715 TDAP VACCINE 7 YRS/> IM: CPT | Performed by: PHYSICIAN ASSISTANT

## 2022-04-26 PROCEDURE — 90732 PPSV23 VACC 2 YRS+ SUBQ/IM: CPT | Performed by: PHYSICIAN ASSISTANT

## 2022-04-26 PROCEDURE — 90472 IMMUNIZATION ADMIN EACH ADD: CPT | Performed by: PHYSICIAN ASSISTANT

## 2022-04-26 PROCEDURE — 99214 OFFICE O/P EST MOD 30 MIN: CPT | Mod: 25 | Performed by: PHYSICIAN ASSISTANT

## 2022-04-26 RX ORDER — ZOSTER VACCINE RECOMBINANT, ADJUVANTED 50 MCG/0.5
0.5 KIT INTRAMUSCULAR ONCE
Qty: 1 EACH | Refills: 0 | Status: SHIPPED | OUTPATIENT
Start: 2022-04-26 | End: 2022-04-26

## 2022-04-26 RX ORDER — LEVOTHYROXINE SODIUM 0.03 MG/1
25 TABLET ORAL
Qty: 90 TABLET | Refills: 0 | Status: SHIPPED | OUTPATIENT
Start: 2022-04-26 | End: 2022-07-27

## 2022-04-26 RX ORDER — SULFAMETHOXAZOLE AND TRIMETHOPRIM 400; 80 MG/1; MG/1
1 TABLET ORAL DAILY
Qty: 90 TABLET | Refills: 2 | Status: SHIPPED
Start: 2022-04-26 | End: 2022-09-29

## 2022-04-26 RX ORDER — GABAPENTIN 300 MG/1
300 CAPSULE ORAL 3 TIMES DAILY
Qty: 270 CAPSULE | Refills: 1 | Status: SHIPPED | OUTPATIENT
Start: 2022-04-26 | End: 2023-04-06

## 2022-04-26 ASSESSMENT — FIBROSIS 4 INDEX: FIB4 SCORE: 1.65

## 2022-04-26 NOTE — PROGRESS NOTES
cc:  Thyroid     Subjective:     Soo Huff is a 76 y.o. female presenting for thyroid       Patient presents to the office for thyroid.  Patient presents the office today requesting medication refills.  She is needing her thyroid medication refilled.  Her last set of labs to check her thyroid were 1 year ago and were abnormal.  Patient is also morbidly obese and has hyperlipidemia.  She has actually been working to improve her exercise levels by increasing her steps to 7500 a day.  She states that she has been able to meet her goal and is excited about this.    Patient is also requesting a refill of Bactrim.  She takes this on a daily basis to prevent urinary tract infections.    Patient is also needing a refill of her gabapentin.  Patient does have neuropathy and the gabapentin helps with her neuropathic symptoms.    Patient states that she abruptly stopped her sertraline for chronic major depression.  She states that she was off 2 weeks and her depression became worse.  She noticed it with the first week.     Patient states that she is still having shoulder pain.  She states that she has not seen PT yet but has an appointment in May.  She has seen Dr. Sawyer with Orthopedics.  She states that she had a cortisone injection which had no effect.   Ortho did recommend surgery if the shoulder did not improve.    Review of systems:  See above.   Denies any symptoms unless previously indicated.        Current Outpatient Medications:   •  Zoster Vac Recomb Adjuvanted (SHINGRIX) 50 MCG/0.5ML Recon Susp, Inject 0.5 mL into the shoulder, thigh, or buttocks one time for 1 dose., Disp: 1 Each, Rfl: 0  •  levothyroxine (SYNTHROID) 25 MCG Tab, Take 1 Tablet by mouth every morning on an empty stomach., Disp: 90 Tablet, Rfl: 0  •  gabapentin (NEURONTIN) 300 MG Cap, Take 1 Capsule by mouth 3 times a day., Disp: 270 Capsule, Rfl: 1  •  sulfamethoxazole-trimethoprim (BACTRIM) 400-80 MG Tab, Take 1 Tablet by mouth every day., Disp:  "90 Tablet, Rfl: 2  •  sertraline (ZOLOFT) 100 MG Tab, TAKE ONE TABLET BY MOUTH DAILY, Disp: 90 Tablet, Rfl: 0  •  fluticasone-salmeterol (ADVAIR) 250-50 MCG/DOSE AEROSOL POWDER, BREATH ACTIVATED, INHALE ONE PUFF BY MOUTH TWICE A DAY, Disp: 1 Each, Rfl: 0  •  montelukast (SINGULAIR) 10 MG Tab, TAKE ONE TABLET BY MOUTH DAILY, Disp: 30 Tablet, Rfl: 3  •  loratadine (CLARITIN) 10 MG Tab, TAKE ONE TABLET BY MOUTH DAILY, Disp: 30 Tablet, Rfl: 6  •  celecoxib (CELEBREX) 200 MG Cap, Take 1 Capsule by mouth every day., Disp: 90 Capsule, Rfl: 0  •  fluticasone (FLONASE) 50 MCG/ACT nasal spray, Administer 1 Spray into affected nostril(S) every day., Disp: 16 g, Rfl: 5  •  Pseudoeph-Doxylamine-DM-APAP (NYQUIL PO), Take  by mouth., Disp: , Rfl:   •  Glucos-Chond-Hyal Ac-Ca Fructo (MOVE FREE JOINT HEALTH ADVANCE PO), Take 2 Tabs by mouth., Disp: , Rfl:     Allergies, past medical history, past surgical history, family history, social history reviewed and updated    Objective:     Vitals: /78 (BP Location: Left arm, Patient Position: Sitting, BP Cuff Size: Adult)   Pulse 69   Temp 36.8 °C (98.3 °F) (Temporal)   Resp 16   Ht 1.575 m (5' 2\")   Wt 89.4 kg (197 lb 3.2 oz)   SpO2 93%   BMI 36.07 kg/m²   General: Alert, pleasant, NAD  EYES:   PERRL, EOMI, no icterus or pallor.  Conjunctivae and lids normal.   HENT:  Normocephalic.  External ears normal.   Neck supple.     Respiratory: Normal respiratory effort.    Abdomen: Obese  Skin: Warm, dry, no rashes.  Musculoskeletal: Gait is normal.  Moves all extremities well.    Extremities: Decreased range of motion right shoulder..   Neurological: No tremors, sensation grossly intact, CN2-12 intact.  Psych:  Affect/mood is normal, judgement is good, memory is intact, grooming is appropriate.    Assessment/Plan:     Soo was seen today for medication refill.    Diagnoses and all orders for this visit:    Acquired hypothyroidism  -     Patient identified as having weight " management issue.  Appropriate orders and counseling given.  -     Lipid Profile; Future  -     Comp Metabolic Panel; Future  -     TSH; Future  -     FREE THYROXINE; Future  -     levothyroxine (SYNTHROID) 25 MCG Tab; Take 1 Tablet by mouth every morning on an empty stomach.  Other hyperlipidemia  -     Patient identified as having weight management issue.  Appropriate orders and counseling given.  -     Lipid Profile; Future  -     Comp Metabolic Panel; Future  -     TSH; Future  -     FREE THYROXINE; Future  Obesity (BMI 30-39.9)  -     Patient identified as having weight management issue.  Appropriate orders and counseling given.  -     Lipid Profile; Future  -     Comp Metabolic Panel; Future  -     TSH; Future  -     FREE THYROXINE; Future    Labs have been ordered to evaluate further.  We will follow-up in 8 weeks with test results.  Patient will continue to work on improved diet and exercise.      Recurrent UTI  -     sulfamethoxazole-trimethoprim (BACTRIM) 400-80 MG Tab; Take 1 Tablet by mouth every day.  Neuropathy  -     gabapentin (NEURONTIN) 300 MG Cap; Take 1 Capsule by mouth 3 times a day.    Medications refilled at this time.    Current mild episode of major depressive disorder without prior episode (HCC) I do not recommend    Future use of abrupt cessation of medication.  We have discussed this in detail today.  Patient agrees that this is not a good plan for her.  She will continue with medication at this time.  If at any point she chooses to stop the medication, she will come in to be seen first.    Rotator cuff tear arthropathy of right shoulder    Recommend patient be seen by orthopedics.  Her referral is still good.  She will call to schedule a follow-up.    Need for hepatitis C screening test  -     HEP C VIRUS ANTIBODY; Future    Labs have been ordered to evaluate further.    Need for vaccination  -     Zoster Vac Recomb Adjuvanted (SHINGRIX) 50 MCG/0.5ML Recon Susp; Inject 0.5 mL into the  shoulder, thigh, or buttocks one time for 1 dose.  -     Pneumococal Polysaccharide Vaccine 23-Valent =>3YO SQ/IM  -     Tdap Vaccine =>8YO IM    Rx for Shingrix printed.  Patient to have pneumococcal and Tdap today.        No follow-ups on file.    Please note that this dictation was created using voice recognition software. I have made every reasonable attempt to correct obvious errors, but expect that there are errors of grammar and possible content that I did not discover before finalizing note.

## 2022-05-04 ENCOUNTER — HOSPITAL ENCOUNTER (OUTPATIENT)
Dept: LAB | Facility: MEDICAL CENTER | Age: 77
End: 2022-05-04
Attending: PHYSICIAN ASSISTANT
Payer: MEDICARE

## 2022-05-04 ENCOUNTER — OFFICE VISIT (OUTPATIENT)
Dept: URGENT CARE | Facility: PHYSICIAN GROUP | Age: 77
End: 2022-05-04
Payer: MEDICARE

## 2022-05-04 ENCOUNTER — APPOINTMENT (OUTPATIENT)
Dept: RADIOLOGY | Facility: IMAGING CENTER | Age: 77
End: 2022-05-04
Attending: STUDENT IN AN ORGANIZED HEALTH CARE EDUCATION/TRAINING PROGRAM
Payer: MEDICARE

## 2022-05-04 VITALS
OXYGEN SATURATION: 92 % | BODY MASS INDEX: 36.47 KG/M2 | RESPIRATION RATE: 16 BRPM | TEMPERATURE: 97.4 F | HEIGHT: 62 IN | HEART RATE: 62 BPM | SYSTOLIC BLOOD PRESSURE: 132 MMHG | DIASTOLIC BLOOD PRESSURE: 82 MMHG | WEIGHT: 198.2 LBS

## 2022-05-04 DIAGNOSIS — E66.9 OBESITY (BMI 30-39.9): ICD-10-CM

## 2022-05-04 DIAGNOSIS — E03.9 ACQUIRED HYPOTHYROIDISM: ICD-10-CM

## 2022-05-04 DIAGNOSIS — R10.84 GENERALIZED ABDOMINAL PAIN: ICD-10-CM

## 2022-05-04 DIAGNOSIS — E78.49 OTHER HYPERLIPIDEMIA: ICD-10-CM

## 2022-05-04 DIAGNOSIS — K59.00 CONSTIPATION, UNSPECIFIED CONSTIPATION TYPE: ICD-10-CM

## 2022-05-04 DIAGNOSIS — R11.0 NAUSEA: ICD-10-CM

## 2022-05-04 DIAGNOSIS — Z11.59 NEED FOR HEPATITIS C SCREENING TEST: ICD-10-CM

## 2022-05-04 LAB
ALBUMIN SERPL BCP-MCNC: 4.5 G/DL (ref 3.2–4.9)
ALBUMIN/GLOB SERPL: 1.5 G/DL
ALP SERPL-CCNC: 100 U/L (ref 30–99)
ALT SERPL-CCNC: 17 U/L (ref 2–50)
ANION GAP SERPL CALC-SCNC: 12 MMOL/L (ref 7–16)
AST SERPL-CCNC: 21 U/L (ref 12–45)
BILIRUB SERPL-MCNC: 0.6 MG/DL (ref 0.1–1.5)
BUN SERPL-MCNC: 13 MG/DL (ref 8–22)
CALCIUM SERPL-MCNC: 9.6 MG/DL (ref 8.5–10.5)
CHLORIDE SERPL-SCNC: 104 MMOL/L (ref 96–112)
CHOLEST SERPL-MCNC: 240 MG/DL (ref 100–199)
CO2 SERPL-SCNC: 23 MMOL/L (ref 20–33)
CREAT SERPL-MCNC: 0.62 MG/DL (ref 0.5–1.4)
FASTING STATUS PATIENT QL REPORTED: NORMAL
GFR SERPLBLD CREATININE-BSD FMLA CKD-EPI: 92 ML/MIN/1.73 M 2
GLOBULIN SER CALC-MCNC: 3.1 G/DL (ref 1.9–3.5)
GLUCOSE SERPL-MCNC: 93 MG/DL (ref 65–99)
HCV AB SER QL: NORMAL
HDLC SERPL-MCNC: 45 MG/DL
LDLC SERPL CALC-MCNC: 150 MG/DL
POTASSIUM SERPL-SCNC: 5 MMOL/L (ref 3.6–5.5)
PROT SERPL-MCNC: 7.6 G/DL (ref 6–8.2)
SODIUM SERPL-SCNC: 139 MMOL/L (ref 135–145)
T4 FREE SERPL-MCNC: 0.9 NG/DL (ref 0.93–1.7)
TRIGL SERPL-MCNC: 224 MG/DL (ref 0–149)
TSH SERPL DL<=0.005 MIU/L-ACNC: 3.32 UIU/ML (ref 0.38–5.33)

## 2022-05-04 PROCEDURE — 84443 ASSAY THYROID STIM HORMONE: CPT

## 2022-05-04 PROCEDURE — 80061 LIPID PANEL: CPT

## 2022-05-04 PROCEDURE — 86803 HEPATITIS C AB TEST: CPT

## 2022-05-04 PROCEDURE — 80053 COMPREHEN METABOLIC PANEL: CPT

## 2022-05-04 PROCEDURE — 84439 ASSAY OF FREE THYROXINE: CPT

## 2022-05-04 PROCEDURE — 36415 COLL VENOUS BLD VENIPUNCTURE: CPT

## 2022-05-04 PROCEDURE — 99213 OFFICE O/P EST LOW 20 MIN: CPT | Performed by: STUDENT IN AN ORGANIZED HEALTH CARE EDUCATION/TRAINING PROGRAM

## 2022-05-04 PROCEDURE — 74019 RADEX ABDOMEN 2 VIEWS: CPT | Mod: TC,FY | Performed by: STUDENT IN AN ORGANIZED HEALTH CARE EDUCATION/TRAINING PROGRAM

## 2022-05-04 ASSESSMENT — FIBROSIS 4 INDEX: FIB4 SCORE: 1.65

## 2022-05-04 NOTE — PROGRESS NOTES
Subjective:   Soo Huff is a 76 y.o. female who presents for GI Problem (Abdominal pain pt states 7-up is the only thing she could burp with. Pt states feels like she wants to vomit but does not. )      HPI:  Pleasant 76-year-old female presents the clinic for generalized abdominal fullness and nausea over the past 3 days.  Patient states that she does not really have abdominal pain but when she eats she feels like her stomach gurgles and she feels like she has to go to the bathroom and becomes a little bit nauseous.  She states that she thinks it could possibly be from something she ate but the only thing she can think of is she had some chicken on Monday which she feels like she left in the fridge may be a little too long.  She reports that she has not had any vomiting.  She did have 1 episode of diarrhea 2 days ago.  Patient states that she had a bowel movement today but it was pretty small and not a lot of volume.  Patient denies fever, chills, diaphoresis, rash, sore throat, chest pain, palpitation, lower leg swelling, cough, sputum production, shortness of breath, wheezing, heartburn, vomiting, constipation, blood in stool, melena, dysuria, hematuria, flank pain, back pain, dizziness, or headache.  Patient reports that the only thing that seems to make her stomach feel little bit better and is drinking some 7-Up which makes her burp.      Medications:    • celecoxib Caps  • fluticasone  • fluticasone-salmeterol Aepb  • gabapentin Caps  • levothyroxine Tabs  • loratadine Tabs  • montelukast Tabs  • MOVE FREE JOINT HEALTH ADVANCE PO  • NYQUIL PO  • sertraline Tabs  • sulfamethoxazole-trimethoprim Tabs    Allergies: Ciprofloxacin    Problem List: Soo Huff does not have any pertinent problems on file.    Surgical History:  Past Surgical History:   Procedure Laterality Date   • HYSTERECTOMY LAPAROSCOPY         Past Social Hx: Soo Huff  reports that she has quit smoking. She has never used smokeless tobacco.  "She reports previous alcohol use. She reports that she does not use drugs.     Past Family Hx:  Soo Huff family history includes Diabetes in her daughter, maternal aunt, mother, and paternal aunt; Leukemia in her son.     Problem list, medications, and allergies reviewed by myself today in Epic.     Objective:     /82   Pulse 62   Temp 36.3 °C (97.4 °F) (Temporal)   Resp 16   Ht 1.575 m (5' 2\")   Wt 89.9 kg (198 lb 3.2 oz)   SpO2 92%   BMI 36.25 kg/m²     Physical Exam  Vitals reviewed.   Constitutional:       General: She is not in acute distress.     Appearance: Normal appearance.   HENT:      Mouth/Throat:      Mouth: Mucous membranes are moist.   Cardiovascular:      Rate and Rhythm: Normal rate and regular rhythm.      Pulses: Normal pulses.      Heart sounds: Normal heart sounds. No murmur heard.  Pulmonary:      Effort: Pulmonary effort is normal. No respiratory distress.      Breath sounds: Normal breath sounds. No stridor. No wheezing, rhonchi or rales.   Abdominal:      General: Abdomen is protuberant. Bowel sounds are normal. There is no distension.      Palpations: Abdomen is soft.      Tenderness: There is generalized abdominal tenderness. There is no right CVA tenderness, left CVA tenderness, guarding or rebound. Negative signs include Abdi's sign, Rovsing's sign and McBurney's sign.      Hernia: No hernia is present.   Skin:     General: Skin is warm and dry.      Capillary Refill: Capillary refill takes less than 2 seconds.      Findings: No erythema, lesion or rash.   Neurological:      General: No focal deficit present.      Mental Status: She is alert and oriented to person, place, and time.         RADIOLOGY RESULTS   [id number] VIEWS    Result Date: 5/4/2022 5/4/2022 1:17 PM HISTORY/REASON FOR EXAM:  Nausea. TECHNIQUE/EXAM DESCRIPTION AND NUMBER OF VIEWS:   2 views of the abdomen. COMPARISON: 7/28/2021 FINDINGS: There is no evidence of bowel obstruction. There is a " nonspecific bowel gas pattern.  No free intraperitoneal air is identified. There is a moderate amount of colonic stool. Visualized lung bases are clear. Degenerative changes are seen in the spine. Calcific densities in the pelvis likely represent phleboliths.     1.  No evidence of bowel obstruction. Nonspecific bowel gas pattern. 2.  Moderate amount of colonic stool.           Assessment/Plan:     Diagnosis and associated orders:     1. Nausea  [id number] VIEWS   2. Generalized abdominal pain        Comments/MDM:     • Patient's presentation and physical exam findings are consistent with possible constipation or food related gastroenteritis.  Patient has not had any vomiting or significant diarrhea which makes gastroenteritis less likely.  Patient is afebrile and I have very low concern for appendicitis, pancreatitis, and cholecystitis/gallstone.  Patient's pain pattern not consistent with these other diagnoses.  Patient reports that she had a bowel movement today but it was very small volume.  Patient had 1 episode of diarrhea 2 days ago but it was also small volume.  • Patient reports that her pain in her abdomen has not really pain but more of fullness and feeling like she needs to burp and has gas.  • X-ray of abdomen shows no evidence of bowel obstruction.  Nonspecific bowel gas pattern.  In moderate amount of colonic stool.  Based on patient's physical exam and correlation with x-ray, most likely diagnosis is constipation.  • Patient was advised to start increasing fiber through diet.  Patient may also use once daily Metamucil or Benefiber.  If this does not work over the next couple of days, patient may use OTC docusate.  Patient was instructed on how to use this medication and the possible side effects.  Patient is agreeable this plan.  Patient cannot alleviate her constipation with these methods and is unable to pass any stool over the next several days, patient should present to the ER for further  treatment and management.  Patient is agreeable to this plan.  • ER precautions were given.  Patient understands the signs symptoms of warrant immediate reevaluation.         Differential diagnosis, natural history, supportive care, and indications for immediate follow-up discussed.    Advised the patient to follow-up with the primary care physician for recheck, reevaluation, and consideration of further management.    Please note that this dictation was created using voice recognition software. I have made a reasonable attempt to correct obvious errors, but I expect that there are errors of grammar and possibly content that I did not discover before finalizing the note.    Electronically signed by Hossein Serna PA-C.

## 2022-06-17 ENCOUNTER — PATIENT MESSAGE (OUTPATIENT)
Dept: MEDICAL GROUP | Facility: CLINIC | Age: 77
End: 2022-06-17
Payer: MEDICARE

## 2022-06-17 DIAGNOSIS — J45.30 MILD PERSISTENT ASTHMA WITHOUT COMPLICATION: ICD-10-CM

## 2022-06-20 RX ORDER — FLUTICASONE PROPIONATE AND SALMETEROL 250; 50 UG/1; UG/1
1 POWDER RESPIRATORY (INHALATION) EVERY 12 HOURS
Qty: 60 EACH | Refills: 3 | Status: SHIPPED | OUTPATIENT
Start: 2022-06-20 | End: 2022-09-15 | Stop reason: SDUPTHER

## 2022-06-23 ENCOUNTER — OFFICE VISIT (OUTPATIENT)
Dept: MEDICAL GROUP | Facility: CLINIC | Age: 77
End: 2022-06-23
Payer: MEDICARE

## 2022-06-23 VITALS
HEART RATE: 68 BPM | HEIGHT: 62 IN | DIASTOLIC BLOOD PRESSURE: 70 MMHG | TEMPERATURE: 97.1 F | RESPIRATION RATE: 18 BRPM | SYSTOLIC BLOOD PRESSURE: 124 MMHG | BODY MASS INDEX: 36.44 KG/M2 | WEIGHT: 198 LBS | OXYGEN SATURATION: 94 %

## 2022-06-23 DIAGNOSIS — M75.101 ROTATOR CUFF TEAR ARTHROPATHY OF RIGHT SHOULDER: ICD-10-CM

## 2022-06-23 DIAGNOSIS — Z01.83 ENCOUNTER FOR BLOOD TYPING: ICD-10-CM

## 2022-06-23 DIAGNOSIS — K58.2 IRRITABLE BOWEL SYNDROME WITH BOTH CONSTIPATION AND DIARRHEA: ICD-10-CM

## 2022-06-23 DIAGNOSIS — E03.9 ACQUIRED HYPOTHYROIDISM: ICD-10-CM

## 2022-06-23 DIAGNOSIS — E78.49 OTHER HYPERLIPIDEMIA: ICD-10-CM

## 2022-06-23 DIAGNOSIS — M12.811 ROTATOR CUFF TEAR ARTHROPATHY OF RIGHT SHOULDER: ICD-10-CM

## 2022-06-23 PROCEDURE — 99214 OFFICE O/P EST MOD 30 MIN: CPT | Performed by: PHYSICIAN ASSISTANT

## 2022-06-23 RX ORDER — ROSUVASTATIN CALCIUM 10 MG/1
10 TABLET, COATED ORAL EVERY EVENING
Qty: 30 TABLET | Refills: 11 | Status: SHIPPED | OUTPATIENT
Start: 2022-06-23 | End: 2023-05-30 | Stop reason: SDUPTHER

## 2022-06-23 RX ORDER — DICYCLOMINE HYDROCHLORIDE 10 MG/1
10 CAPSULE ORAL
Qty: 120 CAPSULE | Refills: 2 | Status: SHIPPED
Start: 2022-06-23 | End: 2022-09-07

## 2022-06-23 ASSESSMENT — FIBROSIS 4 INDEX: FIB4 SCORE: 1.675700682069209635

## 2022-06-23 NOTE — PROGRESS NOTES
cc:  bloated    Subjective:     Soo Huff is a 76 y.o. female presenting for bloated      Patient presents to the office for bloated.  She has been having diarrhea which was watery.  She states that she had been constipated.  She switched her diet and states that it became more like diarrhea. She feels that is worse with stress.  She has had increased stress.  She states that she is planning to have shoulder surgery and is thinking about retiring.        Patient is also here to follow-up with her most recent test results.  She would like to discuss her labs further including her thyroid and her cholesterol.  She is also requesting that she be typed for blood type.  Patient indicates that she is    Proceeding with rotator cuff surgery for her right shoulder.  She finds that it is becoming more difficult to sleep and more difficult to use her shoulder.    Review of systems:  See above.   Denies any symptoms unless previously indicated.        Current Outpatient Medications:   •  dicyclomine (BENTYL) 10 MG Cap, Take 1 Capsule by mouth 4 Times a Day,Before Meals and at Bedtime., Disp: 120 Capsule, Rfl: 2  •  rosuvastatin (CRESTOR) 10 MG Tab, Take 1 Tablet by mouth every evening., Disp: 30 Tablet, Rfl: 11  •  celecoxib (CELEBREX) 200 MG Cap, TAKE ONE CAPSULE BY MOUTH DAILY, Disp: 30 Capsule, Rfl: 3  •  fluticasone-salmeterol (ADVAIR) 250-50 MCG/ACT AEROSOL POWDER, BREATH ACTIVATED, Inhale 1 Puff every 12 hours., Disp: 60 Each, Rfl: 3  •  fluticasone (FLONASE) 50 MCG/ACT nasal spray, SPRAY ONE SPRAY IN THE AFFECTED NOSTRIL(S) DAILY, Disp: 16 mL, Rfl: 3  •  levothyroxine (SYNTHROID) 25 MCG Tab, Take 1 Tablet by mouth every morning on an empty stomach., Disp: 90 Tablet, Rfl: 0  •  gabapentin (NEURONTIN) 300 MG Cap, Take 1 Capsule by mouth 3 times a day., Disp: 270 Capsule, Rfl: 1  •  sulfamethoxazole-trimethoprim (BACTRIM) 400-80 MG Tab, Take 1 Tablet by mouth every day., Disp: 90 Tablet, Rfl: 2  •  sertraline (ZOLOFT)  "100 MG Tab, TAKE ONE TABLET BY MOUTH DAILY, Disp: 90 Tablet, Rfl: 0  •  montelukast (SINGULAIR) 10 MG Tab, TAKE ONE TABLET BY MOUTH DAILY, Disp: 30 Tablet, Rfl: 3  •  loratadine (CLARITIN) 10 MG Tab, TAKE ONE TABLET BY MOUTH DAILY, Disp: 30 Tablet, Rfl: 6  •  Pseudoeph-Doxylamine-DM-APAP (NYQUIL PO), Take  by mouth., Disp: , Rfl:   •  Glucos-Chond-Hyal Ac-Ca Fructo (MOVE FREE JOINT HEALTH ADVANCE PO), Take 2 Tabs by mouth., Disp: , Rfl:     Allergies, past medical history, past surgical history, family history, social history reviewed and updated    Objective:     Vitals: /70 (BP Location: Left arm, Patient Position: Sitting, BP Cuff Size: Large adult)   Pulse 68   Temp 36.2 °C (97.1 °F) (Temporal)   Resp 18   Ht 1.575 m (5' 2\")   Wt 89.8 kg (198 lb)   SpO2 94%   BMI 36.21 kg/m²   General: Alert, pleasant, NAD  EYES:   PERRL, EOMI, no icterus or pallor.  Conjunctivae and lids normal.   HENT:  Normocephalic.  External ears normal.  Neck supple.    Respiratory: Normal respiratory effort.    Abdomen: obese  Skin: Warm, dry, no rashes.  Musculoskeletal: Gait is normal.  Moves all extremities well.    Extremities: Normal range of motion all extremities.   Neurological: No tremors, sensation grossly intact, CN2-12 intact.  Psych:  Affect/mood is normal, judgement is good, memory is intact, grooming is appropriate.    Assessment/Plan:     Soo was seen today for gi problem.    Diagnoses and all orders for this visit:    Irritable bowel syndrome with both constipation and diarrhea  -     Referral to Gastroenterology  -     dicyclomine (BENTYL) 10 MG Cap; Take 1 Capsule by mouth 4 Times a Day,Before Meals and at Bedtime.    Symptoms classic for irritable bowel.  We will try patient on dicyclomine and refer patient to GI for further evaluation.  Patient to notify us if she has not heard about the referral.  Discussed use of medication.    Other hyperlipidemia  -     rosuvastatin (CRESTOR) 10 MG Tab; Take 1 Tablet " by mouth every evening.  -     Lipid Profile; Future  -     Comp Metabolic Panel; Future    Will start patient on Crestor 10 mg daily.  Side effects of medication discussed.  We will repeat labs in 3 months.    Acquired hypothyroidism    Stable.  Continue with current dose.    Encounter for blood typing  -     ABO AND RH DETERMINATION; Future     Labs ordered.    Rotator cuff tear arthropathy of right shoulder    Patient will follow-up with orthopedics.  Discussed realistic expectations for surgery.        Return in about 3 months (around 9/23/2022), or if symptoms worsen or fail to improve.    Please note that this dictation was created using voice recognition software. I have made every reasonable attempt to correct obvious errors, but expect that there are errors of grammar and possible content that I did not discover before finalizing note.

## 2022-06-23 NOTE — NON-PROVIDER
cc: Stomach issues    Subjective:     Soo Huff is a 76 y.o. female presenting for changes in bowel habits. She has had constipation and diarrhea intermittently for the past moth and a half. She says the diarrhea is concerning because she feels like she has no control and does not know when it will happen. She reports her bowel habits had been regular prior to this. She has been under more stress recently as she has decided to have surgery on her shoulder and is contemplating assisted. She does note her bowel habits had been like this previously when she was under a lot of stress.   She is also wanting to follow up on her thyroid and cholesterol labs.         Review of systems:  See above.   Denies any symptoms unless previously indicated.        Current Outpatient Medications:   •  dicyclomine (BENTYL) 10 MG Cap, Take 1 Capsule by mouth 4 Times a Day,Before Meals and at Bedtime., Disp: 120 Capsule, Rfl: 2  •  rosuvastatin (CRESTOR) 10 MG Tab, Take 1 Tablet by mouth every evening., Disp: 30 Tablet, Rfl: 11  •  celecoxib (CELEBREX) 200 MG Cap, TAKE ONE CAPSULE BY MOUTH DAILY, Disp: 30 Capsule, Rfl: 3  •  fluticasone-salmeterol (ADVAIR) 250-50 MCG/ACT AEROSOL POWDER, BREATH ACTIVATED, Inhale 1 Puff every 12 hours., Disp: 60 Each, Rfl: 3  •  fluticasone (FLONASE) 50 MCG/ACT nasal spray, SPRAY ONE SPRAY IN THE AFFECTED NOSTRIL(S) DAILY, Disp: 16 mL, Rfl: 3  •  levothyroxine (SYNTHROID) 25 MCG Tab, Take 1 Tablet by mouth every morning on an empty stomach., Disp: 90 Tablet, Rfl: 0  •  gabapentin (NEURONTIN) 300 MG Cap, Take 1 Capsule by mouth 3 times a day., Disp: 270 Capsule, Rfl: 1  •  sulfamethoxazole-trimethoprim (BACTRIM) 400-80 MG Tab, Take 1 Tablet by mouth every day., Disp: 90 Tablet, Rfl: 2  •  sertraline (ZOLOFT) 100 MG Tab, TAKE ONE TABLET BY MOUTH DAILY, Disp: 90 Tablet, Rfl: 0  •  montelukast (SINGULAIR) 10 MG Tab, TAKE ONE TABLET BY MOUTH DAILY, Disp: 30 Tablet, Rfl: 3  •  loratadine (CLARITIN) 10 MG  "Tab, TAKE ONE TABLET BY MOUTH DAILY, Disp: 30 Tablet, Rfl: 6  •  Pseudoeph-Doxylamine-DM-APAP (NYQUIL PO), Take  by mouth., Disp: , Rfl:   •  Glucos-Chond-Hyal Ac-Ca Fructo (MOVE FREE JOINT HEALTH ADVANCE PO), Take 2 Tabs by mouth., Disp: , Rfl:     Allergies, past medical history, past surgical history, family history, social history reviewed and updated    Objective:     Vitals: /70 (BP Location: Left arm, Patient Position: Sitting, BP Cuff Size: Large adult)   Pulse 68   Temp 36.2 °C (97.1 °F) (Temporal)   Resp 18   Ht 1.575 m (5' 2\")   Wt 89.8 kg (198 lb)   SpO2 94%   BMI 36.21 kg/m²   General: Alert, pleasant, NAD  EYES:   PERRL, EOMI, no icterus or pallor.  Conjunctivae and lids normal.   Heart: Regular rate and rhythm.  S1 and S2 normal.  No murmurs appreciated.  Respiratory: Normal respiratory effort.  Clear to auscultation bilaterally.  Abdomen: Non-distended, soft, non-tender, no guarding/rebound. Bowel sounds present.  No hepatosplenomegaly.  No hernias. Negative gan sign   Skin: Warm, dry, no rashes.  Musculoskeletal: Gait is normal.  Moves all extremities well.    Psych:  Affect/mood is normal, judgement is good, memory is intact, grooming is appropriate.    Assessment/Plan:     Soo was seen today for gi problem.    Diagnoses and all orders for this visit:    Irritable bowel syndrome with both constipation and diarrhea  -     Referral to Gastroenterology  -     dicyclomine (BENTYL) 10 MG Cap; Take 1 Capsule by mouth 4 Times a Day,Before Meals and at Bedtime.    Encounter for blood typing  -     ABO AND RH DETERMINATION; Future    Other hyperlipidemia  -     rosuvastatin (CRESTOR) 10 MG Tab; Take 1 Tablet by mouth every evening.  -     Lipid Profile; Future  -     Comp Metabolic Panel; Future    Acquired hypothyroidism            Return in about 3 months (around 9/23/2022), or if symptoms worsen or fail to improve.      "

## 2022-09-07 ENCOUNTER — PRE-ADMISSION TESTING (OUTPATIENT)
Dept: ADMISSIONS | Facility: MEDICAL CENTER | Age: 77
End: 2022-09-07
Attending: ORTHOPAEDIC SURGERY
Payer: MEDICARE

## 2022-09-07 RX ORDER — ASPIRIN 325 MG
1 TABLET ORAL DAILY
COMMUNITY
End: 2022-10-27

## 2022-09-07 RX ORDER — IBUPROFEN 400 MG/1
400 TABLET ORAL EVERY 6 HOURS PRN
Status: ON HOLD | COMMUNITY
End: 2022-09-12

## 2022-09-07 NOTE — OR NURSING
Patient instructed to continue prescribed medications through the day before surgery, verbal and written instructions given to take the following medications the day of surgery per anesthesia protocol: ADVAIR, SYNTHROID, pt repeated meds to take DOS and states she wrote it down, I encluded this informations in and email also as well as to stop vitamins, minerals, supplements and ibuprofen now and to stop Celebrex on 09/09/22.            Verbal and written, and pre-admit video website instructions provided via email.     Instructed to wear a button or snap shirt DOS.    Spoke with medical information dept requesting labs and ECG to be FAX to [phone].

## 2022-09-11 ENCOUNTER — ANESTHESIA EVENT (OUTPATIENT)
Dept: SURGERY | Facility: MEDICAL CENTER | Age: 77
End: 2022-09-11
Payer: MEDICARE

## 2022-09-12 ENCOUNTER — ANESTHESIA (OUTPATIENT)
Dept: SURGERY | Facility: MEDICAL CENTER | Age: 77
End: 2022-09-12
Payer: MEDICARE

## 2022-09-12 ENCOUNTER — HOSPITAL ENCOUNTER (OUTPATIENT)
Facility: MEDICAL CENTER | Age: 77
End: 2022-09-12
Attending: ORTHOPAEDIC SURGERY | Admitting: ORTHOPAEDIC SURGERY
Payer: MEDICARE

## 2022-09-12 ENCOUNTER — APPOINTMENT (OUTPATIENT)
Dept: RADIOLOGY | Facility: MEDICAL CENTER | Age: 77
End: 2022-09-12
Attending: ANESTHESIOLOGY
Payer: MEDICARE

## 2022-09-12 VITALS
TEMPERATURE: 97.2 F | OXYGEN SATURATION: 91 % | HEIGHT: 62 IN | BODY MASS INDEX: 36.76 KG/M2 | SYSTOLIC BLOOD PRESSURE: 128 MMHG | RESPIRATION RATE: 17 BRPM | WEIGHT: 199.74 LBS | HEART RATE: 66 BPM | DIASTOLIC BLOOD PRESSURE: 59 MMHG

## 2022-09-12 PROCEDURE — 700101 HCHG RX REV CODE 250: Performed by: ANESTHESIOLOGY

## 2022-09-12 PROCEDURE — 160002 HCHG RECOVERY MINUTES (STAT): Performed by: ORTHOPAEDIC SURGERY

## 2022-09-12 PROCEDURE — 01638 ANES OPN/ARTHR TOT SHO RPLCM: CPT | Performed by: ANESTHESIOLOGY

## 2022-09-12 PROCEDURE — 160036 HCHG PACU - EA ADDL 30 MINS PHASE I: Performed by: ORTHOPAEDIC SURGERY

## 2022-09-12 PROCEDURE — 160047 HCHG PACU  - EA ADDL 30 MINS PHASE II: Performed by: ORTHOPAEDIC SURGERY

## 2022-09-12 PROCEDURE — 700102 HCHG RX REV CODE 250 W/ 637 OVERRIDE(OP): Performed by: ANESTHESIOLOGY

## 2022-09-12 PROCEDURE — 160041 HCHG SURGERY MINUTES - EA ADDL 1 MIN LEVEL 4: Performed by: ORTHOPAEDIC SURGERY

## 2022-09-12 PROCEDURE — 71045 X-RAY EXAM CHEST 1 VIEW: CPT

## 2022-09-12 PROCEDURE — 160009 HCHG ANES TIME/MIN: Performed by: ORTHOPAEDIC SURGERY

## 2022-09-12 PROCEDURE — 64415 NJX AA&/STRD BRCH PLXS IMG: CPT | Mod: 59 | Performed by: ANESTHESIOLOGY

## 2022-09-12 PROCEDURE — 160025 RECOVERY II MINUTES (STATS): Performed by: ORTHOPAEDIC SURGERY

## 2022-09-12 PROCEDURE — A9270 NON-COVERED ITEM OR SERVICE: HCPCS | Performed by: ANESTHESIOLOGY

## 2022-09-12 PROCEDURE — C1713 ANCHOR/SCREW BN/BN,TIS/BN: HCPCS | Performed by: ORTHOPAEDIC SURGERY

## 2022-09-12 PROCEDURE — 160048 HCHG OR STATISTICAL LEVEL 1-5: Performed by: ORTHOPAEDIC SURGERY

## 2022-09-12 PROCEDURE — 64415 NJX AA&/STRD BRCH PLXS IMG: CPT | Performed by: ORTHOPAEDIC SURGERY

## 2022-09-12 PROCEDURE — 160046 HCHG PACU - 1ST 60 MINS PHASE II: Performed by: ORTHOPAEDIC SURGERY

## 2022-09-12 PROCEDURE — 502240 HCHG MISC OR SUPPLY RC 0272: Performed by: ORTHOPAEDIC SURGERY

## 2022-09-12 PROCEDURE — 502000 HCHG MISC OR IMPLANTS RC 0278: Performed by: ORTHOPAEDIC SURGERY

## 2022-09-12 PROCEDURE — 700111 HCHG RX REV CODE 636 W/ 250 OVERRIDE (IP): Performed by: ANESTHESIOLOGY

## 2022-09-12 PROCEDURE — 700105 HCHG RX REV CODE 258: Performed by: ORTHOPAEDIC SURGERY

## 2022-09-12 PROCEDURE — 99100 ANES PT EXTEME AGE<1 YR&>70: CPT | Performed by: ANESTHESIOLOGY

## 2022-09-12 PROCEDURE — 160035 HCHG PACU - 1ST 60 MINS PHASE I: Performed by: ORTHOPAEDIC SURGERY

## 2022-09-12 PROCEDURE — 76942 ECHO GUIDE FOR BIOPSY: CPT | Mod: 26 | Performed by: ANESTHESIOLOGY

## 2022-09-12 PROCEDURE — 700111 HCHG RX REV CODE 636 W/ 250 OVERRIDE (IP)

## 2022-09-12 PROCEDURE — 700101 HCHG RX REV CODE 250: Performed by: ORTHOPAEDIC SURGERY

## 2022-09-12 PROCEDURE — 160029 HCHG SURGERY MINUTES - 1ST 30 MINS LEVEL 4: Performed by: ORTHOPAEDIC SURGERY

## 2022-09-12 DEVICE — IMPLANTABLE DEVICE: Type: IMPLANTABLE DEVICE | Site: SHOULDER | Status: FUNCTIONAL

## 2022-09-12 RX ORDER — ALBUTEROL SULFATE 2.5 MG/3ML
2.5 SOLUTION RESPIRATORY (INHALATION)
Status: DISCONTINUED | OUTPATIENT
Start: 2022-09-12 | End: 2022-09-12 | Stop reason: HOSPADM

## 2022-09-12 RX ORDER — PHENYLEPHRINE HYDROCHLORIDE 10 MG/ML
INJECTION, SOLUTION INTRAMUSCULAR; INTRAVENOUS; SUBCUTANEOUS PRN
Status: DISCONTINUED | OUTPATIENT
Start: 2022-09-12 | End: 2022-09-12 | Stop reason: SURG

## 2022-09-12 RX ORDER — HALOPERIDOL 5 MG/ML
1 INJECTION INTRAMUSCULAR
Status: DISCONTINUED | OUTPATIENT
Start: 2022-09-12 | End: 2022-09-12 | Stop reason: HOSPADM

## 2022-09-12 RX ORDER — CEFAZOLIN SODIUM 1 G/3ML
INJECTION, POWDER, FOR SOLUTION INTRAMUSCULAR; INTRAVENOUS PRN
Status: DISCONTINUED | OUTPATIENT
Start: 2022-09-12 | End: 2022-09-12 | Stop reason: SURG

## 2022-09-12 RX ORDER — ONDANSETRON 2 MG/ML
INJECTION INTRAMUSCULAR; INTRAVENOUS PRN
Status: DISCONTINUED | OUTPATIENT
Start: 2022-09-12 | End: 2022-09-12 | Stop reason: SURG

## 2022-09-12 RX ORDER — LIDOCAINE HYDROCHLORIDE 10 MG/ML
INJECTION, SOLUTION EPIDURAL; INFILTRATION; INTRACAUDAL; PERINEURAL
Status: COMPLETED
Start: 2022-09-12 | End: 2022-09-12

## 2022-09-12 RX ORDER — BUPIVACAINE HYDROCHLORIDE AND EPINEPHRINE 5; 5 MG/ML; UG/ML
INJECTION, SOLUTION EPIDURAL; INTRACAUDAL; PERINEURAL
Status: DISCONTINUED | OUTPATIENT
Start: 2022-09-12 | End: 2022-09-12 | Stop reason: HOSPADM

## 2022-09-12 RX ORDER — LIDOCAINE HYDROCHLORIDE 20 MG/ML
INJECTION, SOLUTION EPIDURAL; INFILTRATION; INTRACAUDAL; PERINEURAL PRN
Status: DISCONTINUED | OUTPATIENT
Start: 2022-09-12 | End: 2022-09-12 | Stop reason: SURG

## 2022-09-12 RX ORDER — ROPIVACAINE HYDROCHLORIDE 5 MG/ML
INJECTION, SOLUTION EPIDURAL; INFILTRATION; PERINEURAL
Status: COMPLETED | OUTPATIENT
Start: 2022-09-12 | End: 2022-09-12

## 2022-09-12 RX ORDER — DEXAMETHASONE SODIUM PHOSPHATE 4 MG/ML
INJECTION, SOLUTION INTRA-ARTICULAR; INTRALESIONAL; INTRAMUSCULAR; INTRAVENOUS; SOFT TISSUE PRN
Status: DISCONTINUED | OUTPATIENT
Start: 2022-09-12 | End: 2022-09-12 | Stop reason: SURG

## 2022-09-12 RX ORDER — OXYCODONE HCL 5 MG/5 ML
5 SOLUTION, ORAL ORAL
Status: DISCONTINUED | OUTPATIENT
Start: 2022-09-12 | End: 2022-09-12 | Stop reason: HOSPADM

## 2022-09-12 RX ORDER — ACETAMINOPHEN 325 MG/1
650 TABLET ORAL ONCE
Status: COMPLETED | OUTPATIENT
Start: 2022-09-12 | End: 2022-09-12

## 2022-09-12 RX ORDER — DIPHENHYDRAMINE HYDROCHLORIDE 50 MG/ML
12.5 INJECTION INTRAMUSCULAR; INTRAVENOUS
Status: DISCONTINUED | OUTPATIENT
Start: 2022-09-12 | End: 2022-09-12 | Stop reason: HOSPADM

## 2022-09-12 RX ORDER — SODIUM CHLORIDE, SODIUM LACTATE, POTASSIUM CHLORIDE, CALCIUM CHLORIDE 600; 310; 30; 20 MG/100ML; MG/100ML; MG/100ML; MG/100ML
INJECTION, SOLUTION INTRAVENOUS CONTINUOUS
Status: ACTIVE | OUTPATIENT
Start: 2022-09-12 | End: 2022-09-12

## 2022-09-12 RX ORDER — ONDANSETRON 2 MG/ML
4 INJECTION INTRAMUSCULAR; INTRAVENOUS
Status: DISCONTINUED | OUTPATIENT
Start: 2022-09-12 | End: 2022-09-12 | Stop reason: HOSPADM

## 2022-09-12 RX ORDER — OXYCODONE HCL 5 MG/5 ML
7.5 SOLUTION, ORAL ORAL
Status: DISCONTINUED | OUTPATIENT
Start: 2022-09-12 | End: 2022-09-12 | Stop reason: HOSPADM

## 2022-09-12 RX ORDER — CELECOXIB 200 MG/1
200 CAPSULE ORAL ONCE
Status: COMPLETED | OUTPATIENT
Start: 2022-09-12 | End: 2022-09-12

## 2022-09-12 RX ADMIN — CEFAZOLIN 2 G: 330 INJECTION, POWDER, FOR SOLUTION INTRAMUSCULAR; INTRAVENOUS at 07:37

## 2022-09-12 RX ADMIN — SODIUM CHLORIDE, POTASSIUM CHLORIDE, SODIUM LACTATE AND CALCIUM CHLORIDE: 600; 310; 30; 20 INJECTION, SOLUTION INTRAVENOUS at 06:14

## 2022-09-12 RX ADMIN — LIDOCAINE HYDROCHLORIDE 100 MG: 20 INJECTION, SOLUTION EPIDURAL; INFILTRATION; INTRACAUDAL at 07:37

## 2022-09-12 RX ADMIN — PROPOFOL 160 MG: 10 INJECTION, EMULSION INTRAVENOUS at 07:37

## 2022-09-12 RX ADMIN — FENTANYL CITRATE 50 MCG: 50 INJECTION, SOLUTION INTRAMUSCULAR; INTRAVENOUS at 07:37

## 2022-09-12 RX ADMIN — PHENYLEPHRINE HYDROCHLORIDE 100 MCG: 10 INJECTION INTRAVENOUS at 08:10

## 2022-09-12 RX ADMIN — ALBUTEROL SULFATE 2.5 MG: 2.5 SOLUTION RESPIRATORY (INHALATION) at 10:46

## 2022-09-12 RX ADMIN — PHENYLEPHRINE HYDROCHLORIDE 100 MCG: 10 INJECTION INTRAVENOUS at 07:51

## 2022-09-12 RX ADMIN — DEXAMETHASONE SODIUM PHOSPHATE 6 MG: 4 INJECTION, SOLUTION INTRA-ARTICULAR; INTRALESIONAL; INTRAMUSCULAR; INTRAVENOUS; SOFT TISSUE at 07:44

## 2022-09-12 RX ADMIN — ONDANSETRON 4 MG: 2 INJECTION INTRAMUSCULAR; INTRAVENOUS at 07:44

## 2022-09-12 RX ADMIN — LIDOCAINE HYDROCHLORIDE 2 ML: 10 INJECTION, SOLUTION EPIDURAL; INFILTRATION; INTRACAUDAL; PERINEURAL at 06:14

## 2022-09-12 RX ADMIN — CELECOXIB 200 MG: 200 CAPSULE ORAL at 06:23

## 2022-09-12 RX ADMIN — ACETAMINOPHEN 650 MG: 325 TABLET, FILM COATED ORAL at 06:23

## 2022-09-12 RX ADMIN — ROPIVACAINE HYDROCHLORIDE 25 ML: 5 INJECTION, SOLUTION EPIDURAL; INFILTRATION; PERINEURAL at 07:23

## 2022-09-12 ASSESSMENT — PAIN DESCRIPTION - PAIN TYPE
TYPE: ACUTE PAIN
TYPE: ACUTE PAIN
TYPE: CHRONIC PAIN
TYPE: ACUTE PAIN

## 2022-09-12 ASSESSMENT — PAIN SCALES - GENERAL: PAIN_LEVEL: 0

## 2022-09-12 ASSESSMENT — FIBROSIS 4 INDEX: FIB4 SCORE: 1.697749375254330815

## 2022-09-12 NOTE — OP REPORT
OPERATIVE NOTE   Soo Huff   9/12/2022            PRE-OP DIAGNOSIS: Right shoulder rotator cuff tear, labral tear            POST-OP DIAGNOSIS: Right shoulder rotator cuff tear of supraspinatus and infraspinatus, labral tear, partial biceps tendon tear            PROCEDURE:   Right shoulder arthroscopy, rotator cuff repair of supraspinatus and infraspinatus with regenerative augmentation, subacromial decompression, biceps tenotomy, extensive labral debridement            SURGEON: Surgeon(s) and Role:     * Bhupendra Campbell M.D. - Primary           ASSISTANT;  Pepe Galindo PA-C (an assistant was necessary for positioning, anchor placement, driving the scope, suture shuttling, and facilitation of all critical portions of the procedure including the rotator cuff repair)     ANESTHESIA: Dr. Chen, general and interscalene block            ESTIMATED BLOOD LOSS: 2cc            DRAINS: none            TOTAL IV FLUIDS: see chart            SPECIMENS: * No specimens in log *              COMPLICATIONS: none            DISPOSITION: stable            INDICATION: Patient is a pleasant 77-year-old female with chronic right shoulder pain.  Work-up was significant for a full-thickness rotator cuff tear.  She had tried conservative management occluding therapy and an injection but she still had pain and weakness.  We discussed pros and cons of further nonoperative versus operative management for rotator cuff repair.  She had minimal degenerative changes.  I discussed the risks of surgery as outlined in my preoperative consultation note and the patient understood her options elected to proceed with surgery for the right shoulder.            TECHNIQUE:   The patient was met in the preoperative area and the surgical site was marked.  Once again the procedure and the risks were discussed with her.  Consent was obtained.  She underwent an interscalene block by the anesthesia team without complications.  She is then brought to  the operating room and underwent general anesthesia.  She received preoperative antibiotics.  An exam under anesthesia was performed she had full range of motion of the right shoulder and the glenohumeral joint was stable.  She was placed in a beachchair position and all bony prominences were well-padded.  The head position was checked by the anesthesia team as well as myself.  The right upper extremity shoulder was then prepped and draped in usual sterile manner, and a timeout was performed confirming the correct patient, correct site, correct procedure.  We then began by forming a standard posterior portal into the glenohumeral joint and inserted my scope.  I then formed a working anterior portal the rotator interval using spinal needle localization.  A diagnostic arthroscopy was then performed.  There was some grade I-II chondromalacia of the humeral head and grade I chondromalacia of the glenoid.  There were no full-thickness defects.  There was noted to be tearing of the labrum anteriorly superiorly and posteriorly.  There was lift off the biceps anchor from the glenoid as well as well as partial-thickness tearing of the long head of the biceps tendon.  Because of this, we did perform a biceps tenotomy using arthroscopic scissors and we released the biceps from the superior labrum.  We then performed an extensive debridement of the labrum itself anteriorly, superiorly, and posteriorly, and the biceps stump.  The subscapularis was noted to be intact.  There was some minimal interstitial longitudinal tearing but the overall footprint was still intact.  There was noted to be a large tear of the supraspinatus and infraspinatus.  There were no loose bodies in the axillary recess.  At this point we redirected the scope into the subacromial space and the posterior portal.  A lateral portal was established.  A subacromial bursectomy was performed.  There was some mild type II spurring of the acromion therefore an  acromioplasty was performed using a motorized shaver.  Once the spurring was limited, we then turned our attention to the rotator cuff itself.  Posterior lateral viewing portal was established.  There was noted to be a large crescent type tear of the entirety of the supraspinatus and infraspinatus.  There was some mild retraction as well through the glenohumeral joint.  I did debride the edge of the rotator cuff and then assessed his mobility and it was able to be brought back down to the rotator cuff footprint.  So at this point I did prepared the greater tuberosity using a shaver to bring this down to bleeding bone.  From a percutaneous portal, we placed 3 separate Arthrex 4.75 mm swivel lock anchors in the medial aspect of the greater tuberosity, anteriorly, in the middle portion, and posteriorly.  Using a suture passing device, we passed our fiber tape sutures through each corresponding portion of the rotator cuff tear.  Using the cinch sutures from the anchors, we also passed mattress sutures just lateral to our fiber tapes, to act as a ripstop sutures.  Using arthroscopic knot pusher, we then tied down our mattress sutures from each of the 3 medial row anchors.  We were now ready for our lateral row.  We loaded each of our fiber tapes from each of the 3 anchors into an Arthrex 4.75 m swivel lock anchor and placed this in the anterior portion of the lateral row.  Unfortunately due to poor bone quality with this anchor did not have good purchase.  Therefore we reloaded all 3 sutures into a new anchor and placed this more anterior and distal to the initial anchor hole.  This had much better purchase.  We then used our remaining 3 fiber tape sutures into a posterior lateral row anchor, also an Arthrex 4.7 mm swivel lock anchor.  Once again, this had good purchase and we noticed an excellent 3 x 2 repair of the supraspinatus and infraspinatus.  There was good compression down to the greater tuberosity, and the  humeral head and rotator cuff did move as a unit.  Of note, prior to lateral fixation, I did microfracture the greater tuberosity using a pick, to allow for further healing and bleeding.  At this point, due to the patient's age and poor overall quality tissue, I did choose to augment the repair using a regeneten bio inductive collagen implant.  We opened up the [employer] large regeneten implant, and inserted this over the rotator cuff repair.  We used a combination of soft tissue staples and 2 bone staples to attach the patch to the rotator cuff repair itself.  At this point we were satisfied with the procedure.  We suctioned the fluid debris from the shoulder and move her instruments.  The portal sites were closed with 3-0 nylon and sterile dressings were applied as well as a sling.  The patient was awakened from general anesthesia without complications and taken to the PACU in stable condition.  Of note, a 22 modifier is warranted in this case given that there were 2 entire rotator cuff tendons that were torn including the supraspinatus and infraspinatus.  The tissue quality was also quite poor, necessitating augmentation of our rotator cuff repair with the regeneten bio inductive collagen implant, which involved more surgical time compared to a standard 1 tendon repair, thus warranting a 22 modifier

## 2022-09-12 NOTE — ANESTHESIA PROCEDURE NOTES
Airway    Date/Time: 9/12/2022 7:37 AM  Performed by: Bigg Chen M.D.  Authorized by: Bigg Chen M.D.     Location:  OR  Urgency:  Elective  Difficult Airway: No    Indications for Airway Management:  Anesthesia      Spontaneous Ventilation: absent    Sedation Level:  Deep  Preoxygenated: Yes    Patient Position:  Sniffing  MILS Maintained Throughout: No    Mask Difficulty Assessment:  2 - vent by mask + OA or adjuvant +/- NMBA  Final Airway Type:  Supraglottic airway  Final Supraglottic Airway:  Standard LMA    SGA Size:  4  Number of Attempts at Approach:  1  Number of Other Approaches Attempted:  0

## 2022-09-12 NOTE — DISCHARGE INSTRUCTIONS
ACTIVITY: Rest and take it easy for the first 24 hours.  A responsible adult is recommended to remain with you during that time.  It is normal to feel sleepy.  We encourage you to not do anything that requires balance, judgment or coordination.    MILD FLU-LIKE SYMPTOMS ARE NORMAL. YOU MAY EXPERIENCE GENERALIZED MUSCLE ACHES, THROAT IRRITATION, HEADACHE AND/OR SOME NAUSEA.    FOR 24 HOURS DO NOT:  Drive, operate machinery or run household appliances.  Drink beer or alcoholic beverages.   Make important decisions or sign legal documents.    SPECIAL INSTRUCTIONS: See attached instructions from your surgeon. Use incentive spirometer as instructed at home at least every hour you are awake for the next 48 hours.     DIET: To avoid nausea, slowly advance diet as tolerated, avoiding spicy or greasy foods for the first day.  Add more substantial food to your diet according to your physician's instructions.      You should CALL YOUR PHYSICIAN if you develop:  Fever greater than 101 degrees F.  Pain not relieved by medication, or persistent nausea or vomiting.  Excessive bleeding (blood soaking through dressing) or unexpected drainage from the wound.  Extreme redness or swelling around the incision site, drainage of pus or foul smelling drainage.  Inability to urinate or empty your bladder within 8 hours.  Problems with breathing or chest pain.    You should call 911 if you develop problems with breathing or chest pain.  If you are unable to contact your doctor or surgical center, you should go to the nearest emergency room or urgent care center.  Physician's telephone #: [phone]    If any questions arise, call your doctor.  If your doctor is not available, please feel free to call the Surgical Center at [phone].     A registered nurse may call you a few days after your surgery to see how you are doing after your procedure.    MEDICATIONS: Resume taking daily medication.  Take prescribed pain medication with food.   If no medication is prescribed, you may take non-aspirin pain medication if needed.  PAIN MEDICATION CAN BE VERY CONSTIPATING.  Take a stool softener or laxative such as senokot, pericolace, or milk of magnesia if needed.    Prescription given to patient pre-op.  Last pain medication given at N/A.    If your physician has prescribed pain medication that includes Acetaminophen (Tylenol), do not take additional Acetaminophen (Tylenol) while taking the prescribed medication.

## 2022-09-12 NOTE — ANESTHESIA PROCEDURE NOTES
Peripheral Block    Date/Time: 9/12/2022 7:23 AM  Performed by: Bigg Chen M.D.  Authorized by: Bigg Chen M.D.     Patient Location:  Pre-op  Start Time:  9/12/2022 7:23 AM  End Time:  9/12/2022 7:28 AM  Reason for Block: at surgeon's request and post-op pain management ONLY    patient identified, IV checked, site marked, risks and benefits discussed, surgical consent, monitors and equipment checked, pre-op evaluation and timeout performed    Patient Position:  Supine  Prep: ChloraPrep    Monitoring:  Heart rate, continuous pulse ox and cardiac monitor  Block Region:  Upper Extremity  Upper Extremity - Block Type:  BRACHIAL PLEXUS block, Supraclavicular approach    Laterality:  Right  Procedures: ultrasound guided  Image captured, interpreted and electronically stored.  Local Infiltration:  Lidocaine  Strength:  2 %  Dose:  3 ml  Block Type:  Single-shot  Needle Length:  100mm  Needle Gauge:  21 G  Needle Localization:  Ultrasound guidance  Injection Assessment:  Negative aspiration for heme, no paresthesia on injection, incremental injection and local visualized surrounding nerve on ultrasound  Evidence of intravascular injection: No     US Guided Right Supraclavicular Brachial Plexus Block    US transducer placed cephalad and parallel to the right clavicle in angle to view the right subclavian artery with the brachial plexus lateral and superficial to the artery. Needle inserted lateral to the transducer in-plane and advanced with the needle tip visualized continually into the perineural position. After negative aspiration, LA injected in divided doses without resistance.

## 2022-09-12 NOTE — DISCHARGE PLANNING
DISCHARGE PLANNING NOTE - TOTAL JOINT    This writer received a message in MS Teams that pt may need home oxygen post op. Nurse Danielle notified of process and to contact me with any questions. This writer to fax referral when all orders,face-2-face and progress note is in.    1324: this writer med with pt and her duaghters to explain process for home oxygen. Questions answered and Choice received, scanned in to Epic.    1434: Referral packet complete and faxed to first Choice, [employer], at [phone]. Pt updated.    1510: this writer phoned [employer], [phone], and oxygen has been approved. ETA about an hour. Pt and Aicha RN, notified.

## 2022-09-12 NOTE — OR NURSING
0625  Pt allergies and NPO status verified.  Home medications reconciled.  Belongings secured.  Pt verbalizes understanding of pain scale, expected course of stay, and plan of care.  Surgical site verified with pt.  IV access established.  Triple AIM completed. All questions answered.  Bed in low position.  Call light in reach.

## 2022-09-12 NOTE — OR NURSING
1125: Patient arrived to phase II from PACU 1 via gurney. Report received from RN. Respirations are spontaneous and unlabored. Dressing is CDI. VSS on 1L. Pt having trouble maintaining O2 saturations above 90% on room air, will continue to monitor.     1130: Family at bedside.     1215: Home O2 data acquired by CNA. O2 saturation on room air while ambulating was not taken due to low O2 saturation while ambulating with oxygen.     1235: Anesthesia notified.    1255: Home O2 per anesthesia    1400: O2 paperwork submitted    1420: xray at chair side.    1510: Home O2 approved, ETA 1 hour.    1605: Patient education completed, family denies further questions.     1630: Rene, from [employer], at bedside.    1650: Home O2 set up by Rene.    1700: DC'd to care of family post uneventful stay in PACU 2.

## 2022-09-12 NOTE — ANESTHESIA TIME REPORT
Anesthesia Start and Stop Event Times     Date Time Event    9/12/2022 07:28 AM Ready for Procedure    9/12/2022 07:30 AM Anesthesia Start    9/12/2022 09:31 AM Anesthesia Stop        Responsible Staff  09/12/22    Name Role Begin End    Bigg Chen M.D. Anesthesiologist 09/12/22 07:30 AM 09/12/22 09:31 AM        Overtime Reason:  no overtime (within assigned shift)    Comments:

## 2022-09-12 NOTE — ANESTHESIA POSTPROCEDURE EVALUATION
Patient: Soo Huff    Procedure Summary     Date: 09/12/22 Room / Location:  OR  / SURGERY [facility]    Anesthesia Start: 0730 Anesthesia Stop: 0931    Procedures:       RIGHT SHOULDER ARTHROSCOPY ROTATOR CUFF REPAIR, SUBACROMIAL DECOMPRESSION, LABRAL DEBRIDEMENT, BICEPS TENOTOMY (Right: Shoulder)      DECOMPRESSION, SHOULDER, ARTHROSCOPIC (Right: Shoulder)      ARTHROSCOPY, SHOULDER, WITH EXTENSIVE DEBRIDEMENT (Right: Shoulder)      ARTHROSCOPY, SHOULDER, WITH BICEPS TENOTOMY (Right: Shoulder) Diagnosis: (ROTATOR CUFF TEAR, SHOULDER JOINT PAIN; RIGHT)    Surgeons: Bhupendra Campbell M.D. Responsible Provider: Bigg Chen M.D.    Anesthesia Type: general, peripheral nerve block ASA Status: 2          Final Anesthesia Type: general, peripheral nerve block  Last vitals  BP   Blood Pressure : 128/59    Temp   36.2 °C (97.2 °F)    Pulse   66   Resp   17    SpO2   93 %      Anesthesia Post Evaluation    Patient location during evaluation: PACU  Patient participation: complete - patient participated  Level of consciousness: awake and alert  Pain score: 0    Airway patency: patent  Anesthetic complications: no  Cardiovascular status: hemodynamically stable  Respiratory status: acceptable  Hydration status: euvolemic    PONV: none    patient able to participate, but full recovery from regional anesthesia has not occurred and is not expected within the stipulated timeframe for the completion of the evaluation      No notable events documented.     Nurse Pain Score: 0 (NPRS)

## 2022-09-12 NOTE — OR NURSING
0927:Patient arrived from OR via [facility].  Right shoulder dressing CDI; right wrist pulse present. Cold pack to right shoulder.   Pt eyes open with minimal verbal response. No airway in place, on 6L mask..    0940: Pt awake and alert. Denies pain and nausea. Tolerating po fluids. Weaned O2 to 3L mask. Dressing remains C/D/I.    0955: Pt remains awake and alert, verbally responsive. Denies pain, nausea. Pt remains stable on RA. Dressing remains C/D/I.    1000: Placed on 3L NC for dest to mid 80's on RA.    1010: No change in pt status. Remains on 3L NC. IS provided to pt with education on use and purpose. Pt IS goal 1750, pt able to reach 750 at this time. Encouraging pt to cough and deep breathe as well. Pt verbalizes understanding.    1025: Pt tried on RA, desat to 88%. Placed on 2L NC, O2 sat 92-94%. No other changes at this time. Attempted to call julián Raymundo to provide update, no answer will try again shortly.    1040: No change in pt status. O2 weaned to 1L NC, O2 sat 92%. Updated daughter Breanne via phone.    1055: Breathing treatment provided per MD recommendation and prn orders.     1110: No changes in surgical site assessment. Stable for transfer to phase 2 with oxygen.

## 2022-09-12 NOTE — ANESTHESIA PREPROCEDURE EVALUATION
Case: [id number] Date/Time: 09/12/22 0715    Procedures:       RIGHT SHOULDER ARTHROSCOPY ROTATOR CUFF REPAIR, SUBACROMIAL DECOMPRESSION, LABRAL DEBRIDEMENT, POSSIBLE BICEPS TENOTOMY VS TENODESIS      DECOMPRESSION, SHOULDER, ARTHROSCOPIC      ARTHROSCOPY, SHOULDER, WITH EXTENSIVE DEBRIDEMENT      ARTHROSCOPY, SHOULDER, WITH BICEPS TENOTOMY      ARTHROSCOPY, SHOULDER, WITH BICEPS TENODESIS    Pre-op diagnosis: ROTATOR CUFF TEAR, SHOULDER JOINT PAIN; RIGHT    Location:  OR  / SURGERY [facility]    Surgeons: Bhupendra Campbell M.D.          Relevant Problems   PULMONARY   (positive) Asthma   (positive) Mild persistent asthma without complication      ENDO   (positive) Acquired hypothyroidism      Other   (positive) Anxiety   (positive) Arthritis   (positive) Chronic right shoulder pain   (positive) Neuropathy   (positive) Obesity (BMI 30-39.9)       Physical Exam    Airway   Mallampati: II  TM distance: >3 FB  Neck ROM: full       Cardiovascular - normal exam  Rhythm: regular  Rate: normal  (-) murmur     Dental - normal exam      Very poor dentition   Pulmonary - normal exam  Breath sounds clear to auscultation     Abdominal    Neurological - normal exam               Anesthesia Plan    ASA 2       Plan - general and peripheral nerve block     Peripheral nerve block will be post-op pain control  Airway plan will be LMA        Plan Factors:   Patient was not previously instructed to abstain from smoking on day of procedure.  Patient did not smoke on day of procedure.      Induction: intravenous    Postoperative Plan: Postoperative administration of opioids is intended.    Pertinent diagnostic labs and testing reviewed    Informed Consent:    Anesthetic plan and risks discussed with patient.    Use of blood products discussed with: patient whom consented to blood products.

## 2022-09-12 NOTE — PROGRESS NOTES
71 y/o female Patient w/ hx of asthma who is s/p Right shoulder arthroscopy/RCR with a Right Supraclavicular nerve block given for post operative pain control.  No intra-operative issues; however, post-operatively, patient is unable to maintain room air oxygen saturations above 90%;  I was given the following data by PACU RN: RA at rest 86%; 2 liters via NC at rest 93%;  2 liters via NC while ambulating in low 80's and on 6 liters via NC while ambulating 88%; At this point it is advisable to plan for and get this patient home oxygen until she is seen by her PA-C in [place] for further evaluation; I will also order a portable CXR to rule out a pneumothorax.

## 2022-09-15 ENCOUNTER — OFFICE VISIT (OUTPATIENT)
Dept: MEDICAL GROUP | Facility: CLINIC | Age: 77
End: 2022-09-15
Payer: MEDICARE

## 2022-09-15 VITALS
HEIGHT: 62 IN | BODY MASS INDEX: 37.73 KG/M2 | HEART RATE: 66 BPM | WEIGHT: 205 LBS | SYSTOLIC BLOOD PRESSURE: 112 MMHG | TEMPERATURE: 97.5 F | OXYGEN SATURATION: 98 % | DIASTOLIC BLOOD PRESSURE: 60 MMHG | RESPIRATION RATE: 18 BRPM

## 2022-09-15 DIAGNOSIS — I51.7 CARDIOMEGALY: ICD-10-CM

## 2022-09-15 DIAGNOSIS — Z87.891 FORMER SMOKER: ICD-10-CM

## 2022-09-15 DIAGNOSIS — R09.02 HYPOXIA: ICD-10-CM

## 2022-09-15 DIAGNOSIS — M75.101 ROTATOR CUFF TEAR ARTHROPATHY OF RIGHT SHOULDER: ICD-10-CM

## 2022-09-15 DIAGNOSIS — J45.40 MODERATE PERSISTENT ASTHMA, UNSPECIFIED WHETHER COMPLICATED: ICD-10-CM

## 2022-09-15 DIAGNOSIS — M12.811 ROTATOR CUFF TEAR ARTHROPATHY OF RIGHT SHOULDER: ICD-10-CM

## 2022-09-15 PROCEDURE — 99214 OFFICE O/P EST MOD 30 MIN: CPT | Performed by: PHYSICIAN ASSISTANT

## 2022-09-15 RX ORDER — HYDROCODONE BITARTRATE AND ACETAMINOPHEN 5; 325 MG/1; MG/1
TABLET ORAL
COMMUNITY
Start: 2022-09-05 | End: 2022-09-29

## 2022-09-15 RX ORDER — FLUTICASONE PROPIONATE AND SALMETEROL 250; 50 UG/1; UG/1
1 POWDER RESPIRATORY (INHALATION) EVERY 12 HOURS
Qty: 60 EACH | Refills: 3 | Status: SHIPPED | OUTPATIENT
Start: 2022-09-15 | End: 2022-09-19 | Stop reason: SINTOL

## 2022-09-15 ASSESSMENT — FIBROSIS 4 INDEX: FIB4 SCORE: 1.697749375254330815

## 2022-09-15 NOTE — PROGRESS NOTES
"cc:  oxygen    Subjective:     Soo Huff is a 77 y.o. female presenting for oxygen      Patient presents to the office for oxygen.   Patient had a shoulder surgery on 9-.  Postoperatively she was unable to maintain oxygen saturations above 90%.  At 1 point she was unable to maintain above 88% above 2 to 6 L.  She was then provided with home oxygen and advised to follow-up with her primary care.  She is a former smoker.  She also has asthma.  Patient was walked with oxygen and was at 94%.  When she was walked without oxygen she was found to be at 91%.  Patient states that she does have an albuterol inhaler.  She has not used until the day of the surgery so that her lungs could be more \"open\".  She has an appointment with the surgeon on the 23rd.  She has not been scheduled for PT at this time.  X-ray does show cardiomegaly.    Review of systems:  See above.   Denies any symptoms unless previously indicated.        Current Outpatient Medications:     HYDROcodone-acetaminophen (NORCO) 5-325 MG Tab per tablet, TAKE 1 TABLET BY MOUTH EVERY 4 HOURS AS NEEDED FOR PAIN, Disp: , Rfl:     fluticasone-salmeterol (ADVAIR) 250-50 MCG/ACT AEROSOL POWDER, BREATH ACTIVATED, Inhale 1 Puff every 12 hours., Disp: 60 Each, Rfl: 3    Probiotic Product (PROBIOTIC DAILY PO), Take 1 Tablet by mouth every day., Disp: , Rfl:     Apoaequorin 10 MG Cap, Take 1 Capsule by mouth every day., Disp: , Rfl:     Multiple Vitamins-Minerals (MULTIVITAMIN GUMMIES ADULT) Chew Tab, Chew 1 Capsule every day., Disp: , Rfl:     levothyroxine (SYNTHROID) 25 MCG Tab, TAKE ONE TABLET BY MOUTH EVERY MORNING ON AN EMPTY STOMACH, Disp: 90 Tablet, Rfl: 2    rosuvastatin (CRESTOR) 10 MG Tab, Take 1 Tablet by mouth every evening., Disp: 30 Tablet, Rfl: 11    fluticasone (FLONASE) 50 MCG/ACT nasal spray, SPRAY ONE SPRAY IN THE AFFECTED NOSTRIL(S) DAILY, Disp: 16 mL, Rfl: 3    gabapentin (NEURONTIN) 300 MG Cap, Take 1 Capsule by mouth 3 times a day., Disp: " "270 Capsule, Rfl: 1    sulfamethoxazole-trimethoprim (BACTRIM) 400-80 MG Tab, Take 1 Tablet by mouth every day., Disp: 90 Tablet, Rfl: 2    sertraline (ZOLOFT) 100 MG Tab, TAKE ONE TABLET BY MOUTH DAILY (Patient taking differently: Take 50 mg by mouth every day.), Disp: 90 Tablet, Rfl: 0    montelukast (SINGULAIR) 10 MG Tab, TAKE ONE TABLET BY MOUTH DAILY, Disp: 30 Tablet, Rfl: 3    loratadine (CLARITIN) 10 MG Tab, TAKE ONE TABLET BY MOUTH DAILY, Disp: 30 Tablet, Rfl: 6    Pseudoeph-Doxylamine-DM-APAP (NYQUIL PO), Take  by mouth at bedtime as needed., Disp: , Rfl:     Glucos-Chond-Hyal Ac-Ca Fructo (MOVE FREE JOINT HEALTH ADVANCE PO), Take 2 Tablets by mouth every day., Disp: , Rfl:     Allergies, past medical history, past surgical history, family history, social history reviewed and updated    Objective:     Vitals: /60 (BP Location: Left arm, Patient Position: Sitting, BP Cuff Size: Adult long)   Pulse 66   Temp 36.4 °C (97.5 °F) (Temporal)   Resp 18   Ht 1.575 m (5' 2\")   Wt 93 kg (205 lb)   SpO2 98% Comment: o2 at 3  BMI 37.49 kg/m²   General: Alert, pleasant, NAD  EYES:   PERRL, EOMI, no icterus or pallor.  Conjunctivae and lids normal.   HENT:  Normocephalic.  External ears normal.  Neck supple.    Heart: Regular rate and rhythm.  S1 and S2 normal.  No murmurs appreciated.  Respiratory: On O2 nasal cannula 2.5 L.  Abdomen: Non-distended, soft, non-tender,   No hernias.  Skin: Warm, dry, no rashes.  Musculoskeletal: Gait is normal.  Moves all extremities well.    Extremities: Right arm in shoulder sling.   Neurological: No tremors, sensation grossly intact,  CN2-12 intact.  Psych:  Affect/mood is normal, judgement is good, memory is intact, grooming is appropriate.    Assessment/Plan:     Soo was seen today for oxygen dependency and results.    Diagnoses and all orders for this visit:    Hypoxia  -     Referral to Pulmonary and Sleep Medicine  -     fluticasone-salmeterol (ADVAIR) 250-50 MCG/ACT " AEROSOL POWDER, BREATH ACTIVATED; Inhale 1 Puff every 12 hours.    Cardiomegaly  -     Referral to Pulmonary and Sleep Medicine  -     EC-ECHOCARDIOGRAM COMPLETE W/O CONT; Future    Moderate persistent asthma, unspecified whether complicated  -     Referral to Pulmonary and Sleep Medicine  -     fluticasone-salmeterol (ADVAIR) 250-50 MCG/ACT AEROSOL POWDER, BREATH ACTIVATED; Inhale 1 Puff every 12 hours.    Former smoker  -     Referral to Pulmonary and Sleep Medicine  -     EC-ECHOCARDIOGRAM COMPLETE W/O CONT; Future  -     fluticasone-salmeterol (ADVAIR) 250-50 MCG/ACT AEROSOL POWDER, BREATH ACTIVATED; Inhale 1 Puff every 12 hours.    Rotator cuff tear arthropathy of right shoulder    Patient identified to be hypoxic after rotator cuff surgery right shoulder.  She does have moderate persistent asthma as well as being a former smoker.  She has been taking her Advair only once a day instead of twice a day to save money.  My concern is there may also be a COPD component as patient has been hypoxic.  Walking test does show some mild improvement.  We will try decreasing oxygen levels to 2 L and follow-up in 1 to 2 weeks.  We will also submit a referral to pulmonary medicine and obtain an echo as patient has been shown to have cardiomegaly on chest x-ray.      Return if symptoms worsen or fail to improve, for 1-2 weeks.    Please note that this dictation was created using voice recognition software. I have made every reasonable attempt to correct obvious errors, but expect that there are errors of grammar and possible content that I did not discover before finalizing note.

## 2022-09-17 ENCOUNTER — PATIENT MESSAGE (OUTPATIENT)
Dept: MEDICAL GROUP | Facility: CLINIC | Age: 77
End: 2022-09-17
Payer: MEDICARE

## 2022-09-17 DIAGNOSIS — Z87.891 FORMER SMOKER: ICD-10-CM

## 2022-09-17 DIAGNOSIS — J45.40 MODERATE PERSISTENT ASTHMA, UNSPECIFIED WHETHER COMPLICATED: ICD-10-CM

## 2022-09-17 DIAGNOSIS — R09.02 HYPOXIA: ICD-10-CM

## 2022-09-19 RX ORDER — FLUTICASONE PROPIONATE AND SALMETEROL 50; 250 UG/1; UG/1
1 POWDER RESPIRATORY (INHALATION) EVERY 12 HOURS
Qty: 60 EACH | Refills: 3 | Status: SHIPPED | OUTPATIENT
Start: 2022-09-19 | End: 2022-09-22

## 2022-09-20 ENCOUNTER — HOSPITAL ENCOUNTER (OUTPATIENT)
Facility: MEDICAL CENTER | Age: 77
End: 2022-09-20
Attending: PHYSICIAN ASSISTANT
Payer: MEDICARE

## 2022-09-20 ENCOUNTER — OFFICE VISIT (OUTPATIENT)
Dept: MEDICAL GROUP | Facility: CLINIC | Age: 77
End: 2022-09-20
Payer: MEDICARE

## 2022-09-20 VITALS
OXYGEN SATURATION: 93 % | TEMPERATURE: 98.4 F | BODY MASS INDEX: 38.48 KG/M2 | SYSTOLIC BLOOD PRESSURE: 114 MMHG | RESPIRATION RATE: 18 BRPM | HEIGHT: 61 IN | HEART RATE: 63 BPM | DIASTOLIC BLOOD PRESSURE: 68 MMHG | WEIGHT: 203.8 LBS

## 2022-09-20 DIAGNOSIS — J30.2 SEASONAL ALLERGIES: ICD-10-CM

## 2022-09-20 DIAGNOSIS — T50.905A ADVERSE EFFECT OF DRUG, INITIAL ENCOUNTER: ICD-10-CM

## 2022-09-20 DIAGNOSIS — R30.0 DYSURIA: ICD-10-CM

## 2022-09-20 DIAGNOSIS — N02.9 IDIOPATHIC HEMATURIA, UNSPECIFIED WHETHER GLOMERULAR MORPHOLOGIC CHANGES PRESENT: ICD-10-CM

## 2022-09-20 LAB
APPEARANCE UR: NORMAL
BILIRUB UR STRIP-MCNC: NORMAL MG/DL
COLOR UR AUTO: YELLOW
GLUCOSE UR STRIP.AUTO-MCNC: NORMAL MG/DL
KETONES UR STRIP.AUTO-MCNC: NORMAL MG/DL
LEUKOCYTE ESTERASE UR QL STRIP.AUTO: NORMAL
NITRITE UR QL STRIP.AUTO: NORMAL
PH UR STRIP.AUTO: 6.5 [PH] (ref 5–8)
PROT UR QL STRIP: NORMAL MG/DL
RBC UR QL AUTO: NORMAL
SP GR UR STRIP.AUTO: 1.02
UROBILINOGEN UR STRIP-MCNC: 0.2 MG/DL

## 2022-09-20 PROCEDURE — 87086 URINE CULTURE/COLONY COUNT: CPT

## 2022-09-20 PROCEDURE — 81002 URINALYSIS NONAUTO W/O SCOPE: CPT | Performed by: PHYSICIAN ASSISTANT

## 2022-09-20 PROCEDURE — 99999 PR NO CHARGE: CPT | Performed by: PHYSICIAN ASSISTANT

## 2022-09-20 PROCEDURE — 99213 OFFICE O/P EST LOW 20 MIN: CPT | Performed by: PHYSICIAN ASSISTANT

## 2022-09-20 RX ORDER — LORATADINE 10 MG/1
10 TABLET ORAL DAILY
Qty: 30 TABLET | Refills: 6 | Status: SHIPPED | OUTPATIENT
Start: 2022-09-20 | End: 2023-01-09

## 2022-09-20 RX ORDER — METHYLPREDNISOLONE 4 MG/1
TABLET ORAL
Qty: 21 TABLET | Refills: 0 | Status: SHIPPED | OUTPATIENT
Start: 2022-09-20 | End: 2022-09-29

## 2022-09-20 ASSESSMENT — FIBROSIS 4 INDEX: FIB4 SCORE: 1.697749375254330815

## 2022-09-20 NOTE — PROGRESS NOTES
cc:  UTI symptoms    Subjective:     Soo Huff is a 77 y.o. female presenting for UTI symptoms      Patient presents to the office for UTI symptoms.  Patient states that she has been having vaginal itching.  She states that she has been having itching almost everywhere.  She states that she has been taking benadryl.   She recently had surgery to repair a rotator cuff. She denies having a vaginal discharge. She denies an odor.  She states that since she stopped her pain medication, she notices that the itching has improved but is still present.    Review of systems:  See above.   Denies any symptoms unless previously indicated.        Current Outpatient Medications:     methylPREDNISolone (MEDROL DOSEPAK) 4 MG Tablet Therapy Pack, As directed on the packaging label., Disp: 21 Tablet, Rfl: 0    loratadine (CLARITIN) 10 MG Tab, Take 1 Tablet by mouth every day., Disp: 30 Tablet, Rfl: 6    ADVAIR DISKUS 250-50 MCG/ACT AEROSOL POWDER, BREATH ACTIVATED, Inhale 1 Puff every 12 hours., Disp: 60 Each, Rfl: 3    HYDROcodone-acetaminophen (NORCO) 5-325 MG Tab per tablet, TAKE 1 TABLET BY MOUTH EVERY 4 HOURS AS NEEDED FOR PAIN, Disp: , Rfl:     Probiotic Product (PROBIOTIC DAILY PO), Take 1 Tablet by mouth every day., Disp: , Rfl:     Apoaequorin 10 MG Cap, Take 1 Capsule by mouth every day., Disp: , Rfl:     Multiple Vitamins-Minerals (MULTIVITAMIN GUMMIES ADULT) Chew Tab, Chew 1 Capsule every day., Disp: , Rfl:     levothyroxine (SYNTHROID) 25 MCG Tab, TAKE ONE TABLET BY MOUTH EVERY MORNING ON AN EMPTY STOMACH, Disp: 90 Tablet, Rfl: 2    rosuvastatin (CRESTOR) 10 MG Tab, Take 1 Tablet by mouth every evening., Disp: 30 Tablet, Rfl: 11    fluticasone (FLONASE) 50 MCG/ACT nasal spray, SPRAY ONE SPRAY IN THE AFFECTED NOSTRIL(S) DAILY, Disp: 16 mL, Rfl: 3    gabapentin (NEURONTIN) 300 MG Cap, Take 1 Capsule by mouth 3 times a day., Disp: 270 Capsule, Rfl: 1    sulfamethoxazole-trimethoprim (BACTRIM) 400-80 MG Tab, Take 1  "Tablet by mouth every day., Disp: 90 Tablet, Rfl: 2    sertraline (ZOLOFT) 100 MG Tab, TAKE ONE TABLET BY MOUTH DAILY (Patient taking differently: Take 50 mg by mouth every day.), Disp: 90 Tablet, Rfl: 0    montelukast (SINGULAIR) 10 MG Tab, TAKE ONE TABLET BY MOUTH DAILY, Disp: 30 Tablet, Rfl: 3    Pseudoeph-Doxylamine-DM-APAP (NYQUIL PO), Take  by mouth at bedtime as needed., Disp: , Rfl:     Glucos-Chond-Hyal Ac-Ca Fructo (MOVE FREE JOINT HEALTH ADVANCE PO), Take 2 Tablets by mouth every day., Disp: , Rfl:     Allergies, past medical history, past surgical history, family history, social history reviewed and updated    Objective:     Vitals: /68 (BP Location: Left arm, Patient Position: Sitting, BP Cuff Size: Adult)   Pulse 63   Temp 36.9 °C (98.4 °F) (Temporal)   Resp 18   Ht 1.549 m (5' 1\")   Wt 92.4 kg (203 lb 12.8 oz)   SpO2 93%   BMI 38.51 kg/m²   General: Alert, pleasant, NAD  EYES:   PERRL, EOMI, no icterus or pallor.  Conjunctivae and lids normal.   HENT:  Normocephalic.  External ears normal.  Neck supple.  Respiratory: Normal respiratory effort.    Abdomen: Obese.  Skin: Warm, dry, no rashes.  Musculoskeletal: Gait is normal.  Moves all extremities well.    Extremities: Right shoulder in sling..   Neurological: No tremors, sensation grossly intact,  CN2-12 intact.  Psych:  Affect/mood is normal, judgement is good, memory is intact, grooming is appropriate.    Assessment/Plan:     Soo was seen today for vaginal itching.    Diagnoses and all orders for this visit:    Dysuria  -     POCT Urinalysis  -     URINE CULTURE(NEW)    Adverse effect of drug, initial encounter  -     methylPREDNISolone (MEDROL DOSEPAK) 4 MG Tablet Therapy Pack; As directed on the packaging label.    Seasonal allergies  -     loratadine (CLARITIN) 10 MG Tab; Take 1 Tablet by mouth every day.    Idiopathic hematuria, unspecified whether glomerular morphologic changes present  -     URINE CULTURE(NEW)    Symptoms seem to " relate more to a medication allergy.  We will start patient on Claritin and she will continue Benadryl.  I have prescribed a Medrol Dosepak which she will need to obtain clarification and permission from her surgeon as this could affect healing process.  We will send urine for culture as there is a trace amount of blood and we will follow-up in 2 days.  ER precautions given.      No follow-ups on file.    Please note that this dictation was created using voice recognition software. I have made every reasonable attempt to correct obvious errors, but expect that there are errors of grammar and possible content that I did not discover before finalizing note.

## 2022-09-22 ENCOUNTER — OFFICE VISIT (OUTPATIENT)
Dept: MEDICAL GROUP | Facility: CLINIC | Age: 77
End: 2022-09-22
Payer: MEDICARE

## 2022-09-22 VITALS
WEIGHT: 202 LBS | TEMPERATURE: 98.1 F | SYSTOLIC BLOOD PRESSURE: 114 MMHG | BODY MASS INDEX: 38.14 KG/M2 | HEIGHT: 61 IN | OXYGEN SATURATION: 94 % | RESPIRATION RATE: 18 BRPM | DIASTOLIC BLOOD PRESSURE: 68 MMHG | HEART RATE: 68 BPM

## 2022-09-22 DIAGNOSIS — J45.40 MODERATE PERSISTENT ASTHMA, UNSPECIFIED WHETHER COMPLICATED: ICD-10-CM

## 2022-09-22 DIAGNOSIS — T78.40XD ALLERGY, SUBSEQUENT ENCOUNTER: ICD-10-CM

## 2022-09-22 DIAGNOSIS — R09.02 HYPOXIA: ICD-10-CM

## 2022-09-22 PROCEDURE — 99214 OFFICE O/P EST MOD 30 MIN: CPT | Performed by: PHYSICIAN ASSISTANT

## 2022-09-22 RX ORDER — FLUTICASONE PROPIONATE AND SALMETEROL 500; 50 UG/1; UG/1
1 POWDER RESPIRATORY (INHALATION) EVERY 12 HOURS
Qty: 60 EACH | Refills: 3 | Status: SHIPPED | OUTPATIENT
Start: 2022-09-22 | End: 2022-10-27 | Stop reason: SDUPTHER

## 2022-09-22 RX ORDER — TRAMADOL HYDROCHLORIDE 50 MG/1
50 TABLET ORAL EVERY 6 HOURS PRN
COMMUNITY
Start: 2022-09-20 | End: 2022-10-27

## 2022-09-22 ASSESSMENT — FIBROSIS 4 INDEX: FIB4 SCORE: 1.697749375254330815

## 2022-09-22 NOTE — PROGRESS NOTES
cc:  hypoxia    Subjective:     Soo Huff is a 77 y.o. female presenting for hypoxia      Patient presents to the office for hypoxia.  Walking oxygen levels 91% off oxygen and 94% on oxygen.   Patient has continued on her oxygen.  She is anxious to try and get off oxygen.  Since being seen, patient did call about her Advair.  She was given generic indicating that she could not tolerate the generic due to the powdery taste of the medication.  We did switch her to a brand-name Advair inhaler.  She has been doing well with this.  However, the cost has been significant for her.    Patient states that the itching is better off pain medication.  She was able to start the steroid for the pruritis.  She does believe it has been helping.     Review of systems:  See above.   Denies any symptoms unless previously indicated.        Current Outpatient Medications:     fluticasone-salmeterol (ADVAIR) 500-50 MCG/ACT AEROSOL POWDER, BREATH ACTIVATED, Inhale 1 Puff every 12 hours., Disp: 60 Each, Rfl: 3    methylPREDNISolone (MEDROL DOSEPAK) 4 MG Tablet Therapy Pack, As directed on the packaging label., Disp: 21 Tablet, Rfl: 0    loratadine (CLARITIN) 10 MG Tab, Take 1 Tablet by mouth every day., Disp: 30 Tablet, Rfl: 6    HYDROcodone-acetaminophen (NORCO) 5-325 MG Tab per tablet, TAKE 1 TABLET BY MOUTH EVERY 4 HOURS AS NEEDED FOR PAIN, Disp: , Rfl:     Probiotic Product (PROBIOTIC DAILY PO), Take 1 Tablet by mouth every day., Disp: , Rfl:     Apoaequorin 10 MG Cap, Take 1 Capsule by mouth every day., Disp: , Rfl:     Multiple Vitamins-Minerals (MULTIVITAMIN GUMMIES ADULT) Chew Tab, Chew 1 Capsule every day., Disp: , Rfl:     levothyroxine (SYNTHROID) 25 MCG Tab, TAKE ONE TABLET BY MOUTH EVERY MORNING ON AN EMPTY STOMACH, Disp: 90 Tablet, Rfl: 2    rosuvastatin (CRESTOR) 10 MG Tab, Take 1 Tablet by mouth every evening., Disp: 30 Tablet, Rfl: 11    fluticasone (FLONASE) 50 MCG/ACT nasal spray, SPRAY ONE SPRAY IN THE AFFECTED  "NOSTRIL(S) DAILY, Disp: 16 mL, Rfl: 3    gabapentin (NEURONTIN) 300 MG Cap, Take 1 Capsule by mouth 3 times a day., Disp: 270 Capsule, Rfl: 1    sulfamethoxazole-trimethoprim (BACTRIM) 400-80 MG Tab, Take 1 Tablet by mouth every day., Disp: 90 Tablet, Rfl: 2    sertraline (ZOLOFT) 100 MG Tab, TAKE ONE TABLET BY MOUTH DAILY (Patient taking differently: Take 50 mg by mouth every day.), Disp: 90 Tablet, Rfl: 0    montelukast (SINGULAIR) 10 MG Tab, TAKE ONE TABLET BY MOUTH DAILY, Disp: 30 Tablet, Rfl: 3    Pseudoeph-Doxylamine-DM-APAP (NYQUIL PO), Take  by mouth at bedtime as needed., Disp: , Rfl:     Glucos-Chond-Hyal Ac-Ca Fructo (MOVE FREE JOINT HEALTH ADVANCE PO), Take 2 Tablets by mouth every day., Disp: , Rfl:     traMADol (ULTRAM) 50 MG Tab, Take 50 mg by mouth every 6 hours as needed.  FOR PAIN, Disp: , Rfl:     Allergies, past medical history, past surgical history, family history, social history reviewed and updated    Objective:     Vitals: /68 (BP Location: Left arm, Patient Position: Sitting, BP Cuff Size: Adult long)   Pulse 68   Temp 36.7 °C (98.1 °F) (Temporal)   Resp 18   Ht 1.549 m (5' 1\")   Wt 91.6 kg (202 lb)   SpO2 94% Comment: o2 at 2  BMI 38.17 kg/m²   General: Alert, pleasant, NAD  EYES:   PERRL, EOMI, no icterus or pallor.  Conjunctivae and lids normal.   HENT:  Normocephalic.  External ears normal.  Neck supple.    Heart: Regular rate and rhythm.  S1 and S2 normal.  No murmurs appreciated.  Respiratory: Normal respiratory effort.  Clear to auscultation bilaterally.  Decreased breath sounds all lung fields  Abdomen: obese  Skin: Warm, dry, no rashes.  Musculoskeletal: Gait is normal.      Extremities: right shoulder in sling..   Neurological: No tremors, sensation grossly intact,  CN2-12 intact.  Psych:  Affect/mood is normal, judgement is good, memory is intact, grooming is appropriate.    Assessment/Plan:     Soo was seen today for oxygen dependency.    Diagnoses and all orders " for this visit:    Moderate persistent asthma, unspecified whether complicated  -     fluticasone-salmeterol (ADVAIR) 500-50 MCG/ACT AEROSOL POWDER, BREATH ACTIVATED; Inhale 1 Puff every 12 hours.    Hypoxia  -     fluticasone-salmeterol (ADVAIR) 500-50 MCG/ACT AEROSOL POWDER, BREATH ACTIVATED; Inhale 1 Puff every 12 hours.    Allergy, subsequent encounter    Will have patient start Advair 500/50 twice a day.  We will have her try staying off the oxygen for the next week and follow-up in 1 week to see how she is doing.  Daughter will look into purchasing a pulse oximetry for her as well.  We will continue to remain off pain medication as well.  Patient will finish steroid for allergy.    No follow-ups on file.    Please note that this dictation was created using voice recognition software. I have made every reasonable attempt to correct obvious errors, but expect that there are errors of grammar and possible content that I did not discover before finalizing note.

## 2022-09-23 LAB
BACTERIA UR CULT: NORMAL
SIGNIFICANT IND 70042: NORMAL
SITE SITE: NORMAL
SOURCE SOURCE: NORMAL

## 2022-09-29 ENCOUNTER — OFFICE VISIT (OUTPATIENT)
Dept: MEDICAL GROUP | Facility: CLINIC | Age: 77
End: 2022-09-29
Payer: MEDICARE

## 2022-09-29 VITALS
DIASTOLIC BLOOD PRESSURE: 70 MMHG | OXYGEN SATURATION: 96 % | HEART RATE: 68 BPM | BODY MASS INDEX: 38.14 KG/M2 | SYSTOLIC BLOOD PRESSURE: 110 MMHG | HEIGHT: 61 IN | TEMPERATURE: 98.3 F | WEIGHT: 202 LBS | RESPIRATION RATE: 16 BRPM

## 2022-09-29 DIAGNOSIS — R09.02 HYPOXIA: ICD-10-CM

## 2022-09-29 DIAGNOSIS — Z23 NEED FOR VACCINATION: ICD-10-CM

## 2022-09-29 DIAGNOSIS — M75.101 ROTATOR CUFF TEAR ARTHROPATHY OF RIGHT SHOULDER: ICD-10-CM

## 2022-09-29 DIAGNOSIS — M12.811 ROTATOR CUFF TEAR ARTHROPATHY OF RIGHT SHOULDER: ICD-10-CM

## 2022-09-29 PROCEDURE — G0008 ADMIN INFLUENZA VIRUS VAC: HCPCS | Performed by: PHYSICIAN ASSISTANT

## 2022-09-29 PROCEDURE — 90662 IIV NO PRSV INCREASED AG IM: CPT | Performed by: PHYSICIAN ASSISTANT

## 2022-09-29 PROCEDURE — 99213 OFFICE O/P EST LOW 20 MIN: CPT | Mod: 25 | Performed by: PHYSICIAN ASSISTANT

## 2022-09-29 ASSESSMENT — FIBROSIS 4 INDEX: FIB4 SCORE: 1.697749375254330815

## 2022-09-29 NOTE — PROGRESS NOTES
cc:  oxygen    Subjective:     Soo Huff is a 77 y.o. female presenting for oxygen      Patient presents to the office for oxygen.   Patient states that her oxygen levels are between 94-96.  She has not had to use the oxygen today.   She did purchase the higher dose advair which was greater than $300.  She is finding that her breathing is improving.  Overall she is doing better.  She does have some equipment at home to help improve her breathing.    Patient is still recovering from rotator cuff repair.  She will start PT October 25th.  Currently she is taking Tylenol to help control pain.    Review of systems:  See above.   Denies any symptoms unless previously indicated.        Current Outpatient Medications:     traMADol (ULTRAM) 50 MG Tab, Take 50 mg by mouth every 6 hours as needed.  FOR PAIN, Disp: , Rfl:     fluticasone-salmeterol (ADVAIR) 500-50 MCG/ACT AEROSOL POWDER, BREATH ACTIVATED, Inhale 1 Puff every 12 hours., Disp: 60 Each, Rfl: 3    loratadine (CLARITIN) 10 MG Tab, Take 1 Tablet by mouth every day., Disp: 30 Tablet, Rfl: 6    Probiotic Product (PROBIOTIC DAILY PO), Take 1 Tablet by mouth every day., Disp: , Rfl:     Apoaequorin 10 MG Cap, Take 1 Capsule by mouth every day., Disp: , Rfl:     Multiple Vitamins-Minerals (MULTIVITAMIN GUMMIES ADULT) Chew Tab, Chew 1 Capsule every day., Disp: , Rfl:     levothyroxine (SYNTHROID) 25 MCG Tab, TAKE ONE TABLET BY MOUTH EVERY MORNING ON AN EMPTY STOMACH, Disp: 90 Tablet, Rfl: 2    rosuvastatin (CRESTOR) 10 MG Tab, Take 1 Tablet by mouth every evening., Disp: 30 Tablet, Rfl: 11    fluticasone (FLONASE) 50 MCG/ACT nasal spray, SPRAY ONE SPRAY IN THE AFFECTED NOSTRIL(S) DAILY, Disp: 16 mL, Rfl: 3    gabapentin (NEURONTIN) 300 MG Cap, Take 1 Capsule by mouth 3 times a day., Disp: 270 Capsule, Rfl: 1    sertraline (ZOLOFT) 100 MG Tab, TAKE ONE TABLET BY MOUTH DAILY (Patient taking differently: Take 50 mg by mouth every day.), Disp: 90 Tablet, Rfl: 0     "montelukast (SINGULAIR) 10 MG Tab, TAKE ONE TABLET BY MOUTH DAILY, Disp: 30 Tablet, Rfl: 3    Pseudoeph-Doxylamine-DM-APAP (NYQUIL PO), Take  by mouth at bedtime as needed., Disp: , Rfl:     Glucos-Chond-Hyal Ac-Ca Fructo (MOVE FREE JOINT HEALTH ADVANCE PO), Take 2 Tablets by mouth every day., Disp: , Rfl:     Allergies, past medical history, past surgical history, family history, social history reviewed and updated    Objective:     Vitals: /70 (BP Location: Left arm, Patient Position: Sitting, BP Cuff Size: Small adult)   Pulse 68   Temp 36.8 °C (98.3 °F) (Temporal)   Resp 16   Ht 1.549 m (5' 1\")   Wt 91.6 kg (202 lb)   SpO2 96%   BMI 38.17 kg/m²   General: Alert, pleasant, NAD  EYES:   PERRL, EOMI, no icterus or pallor.  Conjunctivae and lids normal.   HENT:  Normocephalic.  External ears normal.   Neck supple.    Heart: Regular rate and rhythm.  S1 and S2 normal.  No murmurs appreciated.  Respiratory: Normal respiratory effort.  Clear to auscultation bilaterally.  Abdomen: Non-distended, soft, non-tender, no guarding/rebound. Bowel sounds present.  No hepatosplenomegaly.  No hernias.  Skin: Warm, dry, no rashes.  Musculoskeletal: Gait is normal.     Extremities: sling right arm/shoulder.   Neurological: No tremors, sensation grossly intact,  CN2-12 intact.  Psych:  Affect/mood is normal, judgement is good, memory is intact, grooming is appropriate.    Assessment/Plan:     Soo was seen today for oxygen dependency.    Diagnoses and all orders for this visit:    Hypoxia    Improving.  Patient will continue to have oxygen at home.  She will continue to use the inhalers.  We will follow-up in 2 to 3 weeks for recheck.    Rotator cuff tear arthropathy of right shoulder    Patient will continue to see specialty to follow-up with her procedure.  She will begin physical therapy October 25.    Need for vaccination  -     INFLUENZA VACCINE, HIGH DOSE (65+ ONLY)    Flu vaccine given today.      Return in about " 2 weeks (around 10/13/2022), or if symptoms worsen or fail to improve, for 2-3 weeks.    Please note that this dictation was created using voice recognition software. I have made every reasonable attempt to correct obvious errors, but expect that there are errors of grammar and possible content that I did not discover before finalizing note.

## 2022-10-17 ENCOUNTER — TELEPHONE (OUTPATIENT)
Dept: HEALTH INFORMATION MANAGEMENT | Facility: OTHER | Age: 77
End: 2022-10-17
Payer: MEDICARE

## 2022-10-27 ENCOUNTER — OFFICE VISIT (OUTPATIENT)
Dept: MEDICAL GROUP | Facility: CLINIC | Age: 77
End: 2022-10-27
Payer: MEDICARE

## 2022-10-27 VITALS
DIASTOLIC BLOOD PRESSURE: 74 MMHG | TEMPERATURE: 98.4 F | HEART RATE: 67 BPM | HEIGHT: 61 IN | SYSTOLIC BLOOD PRESSURE: 120 MMHG | OXYGEN SATURATION: 92 % | RESPIRATION RATE: 20 BRPM | WEIGHT: 204.2 LBS | BODY MASS INDEX: 38.55 KG/M2

## 2022-10-27 DIAGNOSIS — R09.02 HYPOXIA: ICD-10-CM

## 2022-10-27 DIAGNOSIS — J45.40 MODERATE PERSISTENT ASTHMA, UNSPECIFIED WHETHER COMPLICATED: ICD-10-CM

## 2022-10-27 DIAGNOSIS — M75.121 NONTRAUMATIC COMPLETE TEAR OF RIGHT ROTATOR CUFF: ICD-10-CM

## 2022-10-27 PROBLEM — M75.120 FULL THICKNESS ROTATOR CUFF TEAR: Status: ACTIVE | Noted: 2022-10-19

## 2022-10-27 PROCEDURE — 99213 OFFICE O/P EST LOW 20 MIN: CPT | Performed by: PHYSICIAN ASSISTANT

## 2022-10-27 RX ORDER — FLUTICASONE PROPIONATE AND SALMETEROL 500; 50 UG/1; UG/1
1 POWDER RESPIRATORY (INHALATION) EVERY 12 HOURS
Qty: 180 EACH | Refills: 1 | Status: SHIPPED | OUTPATIENT
Start: 2022-10-27 | End: 2022-12-19 | Stop reason: SDUPTHER

## 2022-10-27 ASSESSMENT — FIBROSIS 4 INDEX: FIB4 SCORE: 1.697749375254330815

## 2022-12-19 ENCOUNTER — OFFICE VISIT (OUTPATIENT)
Dept: MEDICAL GROUP | Facility: CLINIC | Age: 77
End: 2022-12-19
Payer: MEDICARE

## 2022-12-19 VITALS
HEART RATE: 71 BPM | BODY MASS INDEX: 39.38 KG/M2 | SYSTOLIC BLOOD PRESSURE: 108 MMHG | HEIGHT: 61 IN | WEIGHT: 208.6 LBS | OXYGEN SATURATION: 93 % | TEMPERATURE: 98.1 F | DIASTOLIC BLOOD PRESSURE: 70 MMHG | RESPIRATION RATE: 16 BRPM

## 2022-12-19 DIAGNOSIS — J45.40 MODERATE PERSISTENT ASTHMA, UNSPECIFIED WHETHER COMPLICATED: ICD-10-CM

## 2022-12-19 DIAGNOSIS — M19.90 ARTHRITIS: ICD-10-CM

## 2022-12-19 DIAGNOSIS — R09.02 HYPOXIA: ICD-10-CM

## 2022-12-19 DIAGNOSIS — R25.1 TREMOR: ICD-10-CM

## 2022-12-19 DIAGNOSIS — M79.644 PAIN OF RIGHT THUMB: ICD-10-CM

## 2022-12-19 PROBLEM — R29.898 WEAKNESS OF RIGHT HAND: Status: ACTIVE | Noted: 2022-12-02

## 2022-12-19 PROCEDURE — 99213 OFFICE O/P EST LOW 20 MIN: CPT | Performed by: PHYSICIAN ASSISTANT

## 2022-12-19 RX ORDER — FLUTICASONE PROPIONATE AND SALMETEROL 500; 50 UG/1; UG/1
1 POWDER RESPIRATORY (INHALATION) EVERY 12 HOURS
Qty: 180 EACH | Refills: 1 | Status: SHIPPED | OUTPATIENT
Start: 2022-12-19 | End: 2023-10-31 | Stop reason: SDUPTHER

## 2022-12-19 RX ORDER — TRAMADOL HYDROCHLORIDE 50 MG/1
TABLET ORAL
COMMUNITY
End: 2023-12-05

## 2022-12-19 ASSESSMENT — FIBROSIS 4 INDEX: FIB4 SCORE: 1.697749375254330815

## 2022-12-19 NOTE — PROGRESS NOTES
cc:  follow up asthma    Subjective:     Soo Huff is a 77 y.o. female presenting for follow up asthma      Patient presents to the office for follow up asthma.  Patient states that she has been using a spirometer and has been getting up past 2000.  She had to use her advair 250/50 for a few days as they could not get the 500/50 and patient and daughter were able to tell and hear a difference.     Patient states that she just had a new MRI of her shoulder.  She states that she has a new tremor.  Ortho was concerned that she may have a new tear.  She is following up with her surgeon.   She has a nerve study in 3 days.      Review of systems:  See above.   Denies any symptoms unless previously indicated.        Current Outpatient Medications:     traMADol (ULTRAM) 50 MG Tab, tramadol 50 mg tablet  Take 1 tablet every 6 hours by oral route as needed., Disp: , Rfl:     fluticasone-salmeterol (ADVAIR) 500-50 MCG/ACT AEROSOL POWDER, BREATH ACTIVATED, Inhale 1 Puff every 12 hours., Disp: 180 Each, Rfl: 1    loratadine (CLARITIN) 10 MG Tab, Take 1 Tablet by mouth every day., Disp: 30 Tablet, Rfl: 6    levothyroxine (SYNTHROID) 25 MCG Tab, TAKE ONE TABLET BY MOUTH EVERY MORNING ON AN EMPTY STOMACH, Disp: 90 Tablet, Rfl: 2    rosuvastatin (CRESTOR) 10 MG Tab, Take 1 Tablet by mouth every evening., Disp: 30 Tablet, Rfl: 11    fluticasone (FLONASE) 50 MCG/ACT nasal spray, SPRAY ONE SPRAY IN THE AFFECTED NOSTRIL(S) DAILY, Disp: 16 mL, Rfl: 3    gabapentin (NEURONTIN) 300 MG Cap, Take 1 Capsule by mouth 3 times a day., Disp: 270 Capsule, Rfl: 1    sertraline (ZOLOFT) 100 MG Tab, TAKE ONE TABLET BY MOUTH DAILY (Patient taking differently: Take 50 mg by mouth every day.), Disp: 90 Tablet, Rfl: 0    montelukast (SINGULAIR) 10 MG Tab, TAKE ONE TABLET BY MOUTH DAILY, Disp: 30 Tablet, Rfl: 3    Allergies, past medical history, past surgical history, family history, social history reviewed and updated    Objective:     Vitals: BP  "108/70 (BP Location: Left arm, Patient Position: Sitting, BP Cuff Size: Large adult)   Pulse 71   Temp 36.7 °C (98.1 °F) (Temporal)   Resp 16   Ht 1.549 m (5' 1\")   Wt 94.6 kg (208 lb 9.6 oz)   SpO2 93%   BMI 39.41 kg/m²   General: Alert, pleasant, NAD  EYES:   PERRL, EOMI, no icterus or pallor.  Conjunctivae and lids normal.   HENT:  Normocephalic.  External ears normal.   Neck supple.   Heart: Regular rate and rhythm.  S1 and S2 normal.  No murmurs appreciated.  Respiratory: Normal respiratory effort.  Clear to auscultation bilaterally.  Abdomen: obese  Skin: Warm, dry, no rashes.  Musculoskeletal: Gait is normal.  Moves all extremities well.    Extremities: thumb brace right hand.   Neurological: No tremors, sensation grossly intact,  CN2-12 intact.  Psych:  Affect/mood is normal, judgement is good, memory is intact, grooming is appropriate.    Assessment/Plan:     Soo was seen today for asthma.    Diagnoses and all orders for this visit:    Moderate persistent asthma, unspecified whether complicated  -     fluticasone-salmeterol (ADVAIR) 500-50 MCG/ACT AEROSOL POWDER, BREATH ACTIVATED; Inhale 1 Puff every 12 hours.  Hypoxia  -     fluticasone-salmeterol (ADVAIR) 500-50 MCG/ACT AEROSOL POWDER, BREATH ACTIVATED; Inhale 1 Puff every 12 hours.    Doing well with the Advair inhaler.  We will continue at this time and follow-up in 3 months.    Tremor  Pain of right thumb  Arthritis    Patient is following up with orthopedics.  Possible benign essential tremor with arthritis of the right thumb.  Will await consult notes.      Return in about 3 months (around 3/19/2023).    Please note that this dictation was created using voice recognition software. I have made every reasonable attempt to correct obvious errors, but expect that there are errors of grammar and possible content that I did not discover before finalizing note.   "

## 2023-01-03 DIAGNOSIS — N39.0 RECURRENT UTI: ICD-10-CM

## 2023-01-03 RX ORDER — SULFAMETHOXAZOLE AND TRIMETHOPRIM 400; 80 MG/1; MG/1
1 TABLET ORAL DAILY
Qty: 90 TABLET | Refills: 1 | Status: SHIPPED
Start: 2023-01-03 | End: 2023-04-12

## 2023-02-02 ENCOUNTER — OFFICE VISIT (OUTPATIENT)
Dept: MEDICAL GROUP | Facility: CLINIC | Age: 78
End: 2023-02-02
Payer: MEDICARE

## 2023-02-02 VITALS
RESPIRATION RATE: 20 BRPM | OXYGEN SATURATION: 94 % | BODY MASS INDEX: 39.04 KG/M2 | TEMPERATURE: 98.4 F | HEART RATE: 71 BPM | DIASTOLIC BLOOD PRESSURE: 72 MMHG | SYSTOLIC BLOOD PRESSURE: 136 MMHG | WEIGHT: 206.8 LBS | HEIGHT: 61 IN

## 2023-02-02 DIAGNOSIS — J20.9 ACUTE EXACERBATION OF CHRONIC BRONCHITIS (HCC): ICD-10-CM

## 2023-02-02 DIAGNOSIS — J42 ACUTE EXACERBATION OF CHRONIC BRONCHITIS (HCC): ICD-10-CM

## 2023-02-02 PROBLEM — R20.2 PARESTHESIA: Status: ACTIVE | Noted: 2022-12-22

## 2023-02-02 PROCEDURE — 99213 OFFICE O/P EST LOW 20 MIN: CPT | Performed by: PHYSICIAN ASSISTANT

## 2023-02-02 RX ORDER — DOXYCYCLINE HYCLATE 100 MG
100 TABLET ORAL 2 TIMES DAILY
Qty: 20 TABLET | Refills: 0 | Status: SHIPPED
Start: 2023-02-02 | End: 2023-03-08

## 2023-02-02 RX ORDER — IPRATROPIUM BROMIDE AND ALBUTEROL SULFATE 2.5; .5 MG/3ML; MG/3ML
3 SOLUTION RESPIRATORY (INHALATION) 4 TIMES DAILY
Qty: 300 ML | Refills: 2 | Status: SHIPPED | OUTPATIENT
Start: 2023-02-02 | End: 2023-02-13 | Stop reason: SDUPTHER

## 2023-02-02 RX ORDER — METHYLPREDNISOLONE 4 MG/1
TABLET ORAL
Qty: 21 TABLET | Refills: 0 | Status: SHIPPED | OUTPATIENT
Start: 2023-02-02 | End: 2023-04-26 | Stop reason: SDUPTHER

## 2023-02-02 ASSESSMENT — FIBROSIS 4 INDEX: FIB4 SCORE: 1.697749375254330815

## 2023-02-02 ASSESSMENT — PATIENT HEALTH QUESTIONNAIRE - PHQ9
1. LITTLE INTEREST OR PLEASURE IN DOING THINGS: NOT AT ALL
7. TROUBLE CONCENTRATING ON THINGS, SUCH AS READING THE NEWSPAPER OR WATCHING TELEVISION: NOT AT ALL
9. THOUGHTS THAT YOU WOULD BE BETTER OFF DEAD, OR OF HURTING YOURSELF: NOT AT ALL
8. MOVING OR SPEAKING SO SLOWLY THAT OTHER PEOPLE COULD HAVE NOTICED. OR THE OPPOSITE, BEING SO FIGETY OR RESTLESS THAT YOU HAVE BEEN MOVING AROUND A LOT MORE THAN USUAL: NOT AT ALL
4. FEELING TIRED OR HAVING LITTLE ENERGY: NOT AT ALL
2. FEELING DOWN, DEPRESSED, IRRITABLE, OR HOPELESS: NOT AT ALL
SUM OF ALL RESPONSES TO PHQ QUESTIONS 1-9: 1
6. FEELING BAD ABOUT YOURSELF - OR THAT YOU ARE A FAILURE OR HAVE LET YOURSELF OR YOUR FAMILY DOWN: NOT AL ALL
5. POOR APPETITE OR OVEREATING: SEVERAL DAYS
SUM OF ALL RESPONSES TO PHQ9 QUESTIONS 1 AND 2: 0
3. TROUBLE FALLING OR STAYING ASLEEP OR SLEEPING TOO MUCH: NOT AT ALL

## 2023-02-02 NOTE — PROGRESS NOTES
cc:  cough    Subjective:     Soo uHff is a 77 y.o. female presenting for cough      Patient presents to the office for cough.  Patient states that she has been feeling ill for 2.5 weeks. She states that she has had some improvement.  She states that her chest feels heavy and that she cannot sing.  She cannot clear her throat. She states that she had chills but no fever.  She states that she took her advair once a day. She states that she was taking cough drops and cough suppressants.     Review of systems:  See above.   Denies any symptoms unless previously indicated.        Current Outpatient Medications:     doxycycline (VIBRAMYCIN) 100 MG Tab, Take 1 Tablet by mouth 2 times a day., Disp: 20 Tablet, Rfl: 0    ipratropium-albuterol (DUONEB) 0.5-2.5 (3) MG/3ML nebulizer solution, Take 3 mL by nebulization 4 times a day., Disp: 300 mL, Rfl: 2    methylPREDNISolone (MEDROL DOSEPAK) 4 MG Tablet Therapy Pack, As directed on the packaging label., Disp: 21 Tablet, Rfl: 0    loratadine (CLARITIN) 10 MG Tab, TAKE ONE TABLET BY MOUTH DAILY, Disp: 90 Tablet, Rfl: 1    sulfamethoxazole-trimethoprim (BACTRIM) 400-80 MG Tab, Take 1 Tablet by mouth every day., Disp: 90 Tablet, Rfl: 1    traMADol (ULTRAM) 50 MG Tab, tramadol 50 mg tablet  Take 1 tablet every 6 hours by oral route as needed., Disp: , Rfl:     fluticasone-salmeterol (ADVAIR) 500-50 MCG/ACT AEROSOL POWDER, BREATH ACTIVATED, Inhale 1 Puff every 12 hours., Disp: 180 Each, Rfl: 1    levothyroxine (SYNTHROID) 25 MCG Tab, TAKE ONE TABLET BY MOUTH EVERY MORNING ON AN EMPTY STOMACH, Disp: 90 Tablet, Rfl: 2    rosuvastatin (CRESTOR) 10 MG Tab, Take 1 Tablet by mouth every evening., Disp: 30 Tablet, Rfl: 11    fluticasone (FLONASE) 50 MCG/ACT nasal spray, SPRAY ONE SPRAY IN THE AFFECTED NOSTRIL(S) DAILY, Disp: 16 mL, Rfl: 3    gabapentin (NEURONTIN) 300 MG Cap, Take 1 Capsule by mouth 3 times a day., Disp: 270 Capsule, Rfl: 1    sertraline (ZOLOFT) 100 MG Tab, TAKE  "ONE TABLET BY MOUTH DAILY (Patient taking differently: Take 50 mg by mouth every day.), Disp: 90 Tablet, Rfl: 0    montelukast (SINGULAIR) 10 MG Tab, TAKE ONE TABLET BY MOUTH DAILY, Disp: 30 Tablet, Rfl: 3    Allergies, past medical history, past surgical history, family history, social history reviewed and updated    Objective:     Vitals: /72 (BP Location: Left arm, Patient Position: Sitting, BP Cuff Size: Adult)   Pulse 71   Temp 36.9 °C (98.4 °F) (Temporal)   Resp 20   Ht 1.549 m (5' 1\")   Wt 93.8 kg (206 lb 12.8 oz)   SpO2 94%   BMI 39.07 kg/m²   General: Alert, pleasant, NAD  EYES:   PERRL, EOMI, no icterus or pallor.  Conjunctivae and lids normal.   HENT:  Normocephalic.  External ears normal.   Neck supple.   Heart: Regular rate and rhythm.  S1 and S2 normal.  No murmurs appreciated.  Respiratory: Normal respiratory effort.  Decreased breath sounds.  Absent lower right lobe.  Coughing in office.  Abdomen: obese  Skin: Warm, dry, no rashes.  Musculoskeletal: Gait is normal.  Moves all extremities well.    Extremities: Normal range of motion all extremities.   Neurological: No tremors, sensation grossly intact,  CN2-12 intact.  Psych:  Affect/mood is normal, judgement is good, memory is intact, grooming is appropriate.    Assessment/Plan:     Soo was seen today for shortness of breath.    Diagnoses and all orders for this visit:    Acute exacerbation of chronic bronchitis (HCC)  -     doxycycline (VIBRAMYCIN) 100 MG Tab; Take 1 Tablet by mouth 2 times a day.  -     DME Nebulizer  -     ipratropium-albuterol (DUONEB) 0.5-2.5 (3) MG/3ML nebulizer solution; Take 3 mL by nebulization 4 times a day.  -     methylPREDNISolone (MEDROL DOSEPAK) 4 MG Tablet Therapy Pack; As directed on the packaging label.      We will order nebulizer for patient.  Along with DuoNeb.  We will start patient on doxycycline twice a day for 10 days.  We will start patient on a Medrol Dosepak.  We will follow-up in 1 week.  " Patient instructed on being evaluated with COPD exacerbation.  Discussed the importance of being seen versus waiting with symptoms.  Discussed the importance of using her inhaler.  If no improvement, patient to go to urgent care or will need to be seen sooner.      Return in about 1 week (around 2/9/2023), or if symptoms worsen or fail to improve.    Please note that this dictation was created using voice recognition software. I have made every reasonable attempt to correct obvious errors, but expect that there are errors of grammar and possible content that I did not discover before finalizing note.

## 2023-02-05 ENCOUNTER — PATIENT MESSAGE (OUTPATIENT)
Dept: MEDICAL GROUP | Facility: CLINIC | Age: 78
End: 2023-02-05
Payer: MEDICARE

## 2023-02-05 DIAGNOSIS — J42 ACUTE EXACERBATION OF CHRONIC BRONCHITIS (HCC): ICD-10-CM

## 2023-02-05 DIAGNOSIS — J20.9 ACUTE EXACERBATION OF CHRONIC BRONCHITIS (HCC): ICD-10-CM

## 2023-02-06 ENCOUNTER — APPOINTMENT (OUTPATIENT)
Dept: SLEEP MEDICINE | Facility: MEDICAL CENTER | Age: 78
End: 2023-02-06
Payer: MEDICARE

## 2023-02-07 RX ORDER — AMOXICILLIN AND CLAVULANATE POTASSIUM 875; 125 MG/1; MG/1
1 TABLET, FILM COATED ORAL 2 TIMES DAILY
Qty: 20 TABLET | Refills: 0 | Status: SHIPPED | OUTPATIENT
Start: 2023-02-07 | End: 2023-02-17

## 2023-02-09 ENCOUNTER — OFFICE VISIT (OUTPATIENT)
Dept: MEDICAL GROUP | Facility: CLINIC | Age: 78
End: 2023-02-09
Payer: MEDICARE

## 2023-02-09 VITALS
OXYGEN SATURATION: 94 % | DIASTOLIC BLOOD PRESSURE: 62 MMHG | TEMPERATURE: 97.5 F | BODY MASS INDEX: 39.08 KG/M2 | HEART RATE: 81 BPM | WEIGHT: 207 LBS | HEIGHT: 61 IN | SYSTOLIC BLOOD PRESSURE: 116 MMHG | RESPIRATION RATE: 18 BRPM

## 2023-02-09 DIAGNOSIS — R10.13 DYSPEPSIA: ICD-10-CM

## 2023-02-09 DIAGNOSIS — J20.9 ACUTE EXACERBATION OF CHRONIC BRONCHITIS (HCC): ICD-10-CM

## 2023-02-09 DIAGNOSIS — J42 ACUTE EXACERBATION OF CHRONIC BRONCHITIS (HCC): ICD-10-CM

## 2023-02-09 PROCEDURE — 99213 OFFICE O/P EST LOW 20 MIN: CPT | Performed by: PHYSICIAN ASSISTANT

## 2023-02-09 RX ORDER — OMEPRAZOLE 20 MG/1
20 CAPSULE, DELAYED RELEASE ORAL DAILY
Qty: 30 CAPSULE | Refills: 0 | Status: SHIPPED | OUTPATIENT
Start: 2023-02-09 | End: 2023-12-05

## 2023-02-09 ASSESSMENT — FIBROSIS 4 INDEX: FIB4 SCORE: 1.697749375254330815

## 2023-02-09 NOTE — PROGRESS NOTES
cc:  short of breath    Subjective:     Soo Huff is a 77 y.o. female presenting for short of breath      Patient presents to the office for short of breath.  Patient states that she received the nebulizer yesterday.  She has not picked up the medication for the nebulizer.  She was not able to tolerate the doxycycline.  She took 4 days worth and has not taken the last 3 days.  We sent in augmentin but she has not picked up as she did not receive the message until last night that the antibiotic was changed.   She states that she did not finish the steroid due to vomiting.  She states that her shortness of breath is better.  She states that she is not coughing.     Review of systems:  See above.   Denies any symptoms unless previously indicated.        Current Outpatient Medications:     omeprazole (PRILOSEC) 20 MG delayed-release capsule, Take 1 Capsule by mouth every day., Disp: 30 Capsule, Rfl: 0    doxycycline (VIBRAMYCIN) 100 MG Tab, Take 1 Tablet by mouth 2 times a day., Disp: 20 Tablet, Rfl: 0    methylPREDNISolone (MEDROL DOSEPAK) 4 MG Tablet Therapy Pack, As directed on the packaging label., Disp: 21 Tablet, Rfl: 0    loratadine (CLARITIN) 10 MG Tab, TAKE ONE TABLET BY MOUTH DAILY, Disp: 90 Tablet, Rfl: 1    sulfamethoxazole-trimethoprim (BACTRIM) 400-80 MG Tab, Take 1 Tablet by mouth every day., Disp: 90 Tablet, Rfl: 1    traMADol (ULTRAM) 50 MG Tab, tramadol 50 mg tablet  Take 1 tablet every 6 hours by oral route as needed., Disp: , Rfl:     fluticasone-salmeterol (ADVAIR) 500-50 MCG/ACT AEROSOL POWDER, BREATH ACTIVATED, Inhale 1 Puff every 12 hours., Disp: 180 Each, Rfl: 1    levothyroxine (SYNTHROID) 25 MCG Tab, TAKE ONE TABLET BY MOUTH EVERY MORNING ON AN EMPTY STOMACH, Disp: 90 Tablet, Rfl: 2    rosuvastatin (CRESTOR) 10 MG Tab, Take 1 Tablet by mouth every evening., Disp: 30 Tablet, Rfl: 11    fluticasone (FLONASE) 50 MCG/ACT nasal spray, SPRAY ONE SPRAY IN THE AFFECTED NOSTRIL(S) DAILY, Disp: 16  "mL, Rfl: 3    gabapentin (NEURONTIN) 300 MG Cap, Take 1 Capsule by mouth 3 times a day., Disp: 270 Capsule, Rfl: 1    sertraline (ZOLOFT) 100 MG Tab, TAKE ONE TABLET BY MOUTH DAILY (Patient taking differently: Take 50 mg by mouth every day.), Disp: 90 Tablet, Rfl: 0    montelukast (SINGULAIR) 10 MG Tab, TAKE ONE TABLET BY MOUTH DAILY, Disp: 30 Tablet, Rfl: 3    amoxicillin-clavulanate (AUGMENTIN) 875-125 MG Tab, Take 1 Tablet by mouth 2 times a day for 10 days. (Patient not taking: Reported on 2/9/2023), Disp: 20 Tablet, Rfl: 0    ipratropium-albuterol (DUONEB) 0.5-2.5 (3) MG/3ML nebulizer solution, Take 3 mL by nebulization 4 times a day. (Patient not taking: Reported on 2/9/2023), Disp: 300 mL, Rfl: 2    Allergies, past medical history, past surgical history, family history, social history reviewed and updated    Objective:     Vitals: /62 (BP Location: Left arm, Patient Position: Sitting, BP Cuff Size: Adult long)   Pulse 81   Temp 36.4 °C (97.5 °F) (Temporal)   Resp 18   Ht 1.549 m (5' 1\")   Wt 93.9 kg (207 lb)   SpO2 94%   BMI 39.11 kg/m²   General: Alert, pleasant, NAD  EYES:   PERRL, EOMI, no icterus or pallor.  Conjunctivae and lids normal.   HENT:  Normocephalic.  External ears normal.  Neck supple.     Heart: Regular rate and rhythm.  S1 and S2 normal.  No murmurs appreciated.  Respiratory: Normal respiratory effort.  Clear to auscultation bilaterally. Improved from previous visit.  Abdomen: obese  Skin: Warm, dry, no rashes.  Musculoskeletal: Gait is normal.  Moves all extremities well.    Extremities: normal range of motion all extremities.   Neurological: No tremors, sensation grossly intact,  gait is normal, CN2-12 intact.  Psych:  Affect/mood is normal, judgement is good, memory is intact, grooming is appropriate.    Assessment/Plan:     Soo was seen today for shortness of breath.    Diagnoses and all orders for this visit:    Acute exacerbation of chronic bronchitis " (HCC)    Dyspepsia  -     omeprazole (PRILOSEC) 20 MG delayed-release capsule; Take 1 Capsule by mouth every day.      Lung sounds are improved.  I do believe that patient is on the road to recovery.  Doxycycline is probably been upsetting the patient's stomach.  Symptoms seem to be improving since patient is off of the medication.  We will add omeprazole.  The goal is to have her take this hopefully no longer than 2 weeks.  She will then reintroduce her diet slowly after stopping this medication.  We will reevaluate in 2 weeks.  I do recommend continued nebulizer treatments at least once or twice a day as she is still having symptoms.  Recommend that she finish her steroid pack as well.      Return in about 2 weeks (around 2/23/2023), or if symptoms worsen or fail to improve, for sooner if needed. .    Please note that this dictation was created using voice recognition software. I have made every reasonable attempt to correct obvious errors, but expect that there are errors of grammar and possible content that I did not discover before finalizing note.

## 2023-02-10 ENCOUNTER — PATIENT MESSAGE (OUTPATIENT)
Dept: MEDICAL GROUP | Facility: CLINIC | Age: 78
End: 2023-02-10
Payer: MEDICARE

## 2023-02-10 DIAGNOSIS — J42 ACUTE EXACERBATION OF CHRONIC BRONCHITIS (HCC): ICD-10-CM

## 2023-02-10 DIAGNOSIS — J20.9 ACUTE EXACERBATION OF CHRONIC BRONCHITIS (HCC): ICD-10-CM

## 2023-02-13 RX ORDER — IPRATROPIUM BROMIDE AND ALBUTEROL SULFATE 2.5; .5 MG/3ML; MG/3ML
3 SOLUTION RESPIRATORY (INHALATION) 4 TIMES DAILY
Qty: 300 ML | Refills: 2 | Status: SHIPPED | OUTPATIENT
Start: 2023-02-13 | End: 2023-04-12 | Stop reason: SDUPTHER

## 2023-03-08 ENCOUNTER — HOSPITAL ENCOUNTER (OUTPATIENT)
Dept: CARDIOLOGY | Facility: MEDICAL CENTER | Age: 78
End: 2023-03-08
Attending: PHYSICIAN ASSISTANT
Payer: MEDICARE

## 2023-03-08 ENCOUNTER — HOSPITAL ENCOUNTER (OUTPATIENT)
Facility: MEDICAL CENTER | Age: 78
End: 2023-03-08
Attending: PHYSICIAN ASSISTANT
Payer: MEDICARE

## 2023-03-08 ENCOUNTER — OFFICE VISIT (OUTPATIENT)
Dept: MEDICAL GROUP | Facility: CLINIC | Age: 78
End: 2023-03-08
Payer: MEDICARE

## 2023-03-08 VITALS
BODY MASS INDEX: 40.02 KG/M2 | HEART RATE: 72 BPM | TEMPERATURE: 97.9 F | OXYGEN SATURATION: 94 % | WEIGHT: 212 LBS | DIASTOLIC BLOOD PRESSURE: 60 MMHG | SYSTOLIC BLOOD PRESSURE: 114 MMHG | HEIGHT: 61 IN | RESPIRATION RATE: 18 BRPM

## 2023-03-08 DIAGNOSIS — Z87.891 FORMER SMOKER: ICD-10-CM

## 2023-03-08 DIAGNOSIS — N89.8 VAGINAL ITCHING: ICD-10-CM

## 2023-03-08 DIAGNOSIS — I51.7 CARDIOMEGALY: ICD-10-CM

## 2023-03-08 DIAGNOSIS — R30.0 DYSURIA: ICD-10-CM

## 2023-03-08 DIAGNOSIS — N39.0 RECURRENT UTI: ICD-10-CM

## 2023-03-08 LAB
APPEARANCE UR: CLEAR
BILIRUB UR STRIP-MCNC: NEGATIVE MG/DL
COLOR UR AUTO: YELLOW
GLUCOSE UR STRIP.AUTO-MCNC: NEGATIVE MG/DL
KETONES UR STRIP.AUTO-MCNC: NEGATIVE MG/DL
LEUKOCYTE ESTERASE UR QL STRIP.AUTO: NORMAL
LV EJECT FRACT  99904: 60
LV EJECT FRACT MOD 2C 99903: 57.58
LV EJECT FRACT MOD 4C 99902: 63.17
LV EJECT FRACT MOD BP 99901: 58.38
NITRITE UR QL STRIP.AUTO: NEGATIVE
PH UR STRIP.AUTO: 6 [PH] (ref 5–8)
PROT UR QL STRIP: NEGATIVE MG/DL
RBC UR QL AUTO: NEGATIVE
SP GR UR STRIP.AUTO: 1.02
UROBILINOGEN UR STRIP-MCNC: 0.2 MG/DL

## 2023-03-08 PROCEDURE — 93306 TTE W/DOPPLER COMPLETE: CPT | Mod: 26 | Performed by: INTERNAL MEDICINE

## 2023-03-08 PROCEDURE — 99213 OFFICE O/P EST LOW 20 MIN: CPT | Performed by: PHYSICIAN ASSISTANT

## 2023-03-08 PROCEDURE — 81002 URINALYSIS NONAUTO W/O SCOPE: CPT | Performed by: PHYSICIAN ASSISTANT

## 2023-03-08 PROCEDURE — 87086 URINE CULTURE/COLONY COUNT: CPT

## 2023-03-08 PROCEDURE — 93306 TTE W/DOPPLER COMPLETE: CPT

## 2023-03-08 RX ORDER — FLUCONAZOLE 150 MG/1
TABLET ORAL
Qty: 2 TABLET | Refills: 1 | Status: SHIPPED
Start: 2023-03-08 | End: 2023-05-30

## 2023-03-08 ASSESSMENT — FIBROSIS 4 INDEX: FIB4 SCORE: 1.697749375254330815

## 2023-03-08 NOTE — PROGRESS NOTES
cc:  uti symptoms    Subjective:     Soo Huff is a 77 y.o. female presenting for uti symptoms      Patient presents to the office for uti symptoms.  Patient has been complaining of vaginal itching and discomfort with urination for the last 10 days.  Patient states that she just finished antibiotics.  She states that she is not seeing any type of vaginal discharge.  Patient states that she has been having hot flashes.    Review of systems:  See above.   Denies any symptoms unless previously indicated.        Current Outpatient Medications:     fluconazole (DIFLUCAN) 150 MG tablet, Take one tab weekly for 2 weeks., Disp: 2 Tablet, Rfl: 1    ipratropium-albuterol (DUONEB) 0.5-2.5 (3) MG/3ML nebulizer solution, Take 3 mL by nebulization 4 times a day., Disp: 300 mL, Rfl: 2    loratadine (CLARITIN) 10 MG Tab, TAKE ONE TABLET BY MOUTH DAILY, Disp: 90 Tablet, Rfl: 1    sulfamethoxazole-trimethoprim (BACTRIM) 400-80 MG Tab, Take 1 Tablet by mouth every day., Disp: 90 Tablet, Rfl: 1    fluticasone-salmeterol (ADVAIR) 500-50 MCG/ACT AEROSOL POWDER, BREATH ACTIVATED, Inhale 1 Puff every 12 hours., Disp: 180 Each, Rfl: 1    levothyroxine (SYNTHROID) 25 MCG Tab, TAKE ONE TABLET BY MOUTH EVERY MORNING ON AN EMPTY STOMACH, Disp: 90 Tablet, Rfl: 2    rosuvastatin (CRESTOR) 10 MG Tab, Take 1 Tablet by mouth every evening., Disp: 30 Tablet, Rfl: 11    fluticasone (FLONASE) 50 MCG/ACT nasal spray, SPRAY ONE SPRAY IN THE AFFECTED NOSTRIL(S) DAILY, Disp: 16 mL, Rfl: 3    gabapentin (NEURONTIN) 300 MG Cap, Take 1 Capsule by mouth 3 times a day., Disp: 270 Capsule, Rfl: 1    sertraline (ZOLOFT) 100 MG Tab, TAKE ONE TABLET BY MOUTH DAILY (Patient taking differently: Take 50 mg by mouth every day.), Disp: 90 Tablet, Rfl: 0    montelukast (SINGULAIR) 10 MG Tab, TAKE ONE TABLET BY MOUTH DAILY, Disp: 30 Tablet, Rfl: 3    omeprazole (PRILOSEC) 20 MG delayed-release capsule, Take 1 Capsule by mouth every day. (Patient not taking: Reported  "on 3/8/2023), Disp: 30 Capsule, Rfl: 0    methylPREDNISolone (MEDROL DOSEPAK) 4 MG Tablet Therapy Pack, As directed on the packaging label. (Patient not taking: Reported on 3/8/2023), Disp: 21 Tablet, Rfl: 0    traMADol (ULTRAM) 50 MG Tab, tramadol 50 mg tablet  Take 1 tablet every 6 hours by oral route as needed. (Patient not taking: Reported on 3/8/2023), Disp: , Rfl:     Allergies, past medical history, past surgical history, family history, social history reviewed and updated    Objective:     Vitals: /60 (BP Location: Left arm, Patient Position: Sitting, BP Cuff Size: Adult long)   Pulse 72   Temp 36.6 °C (97.9 °F) (Temporal)   Resp 18   Ht 1.549 m (5' 1\")   Wt 96.2 kg (212 lb)   SpO2 94%   BMI 40.06 kg/m²   General: Alert, pleasant, NAD  EYES:   PERRL, EOMI, no icterus or pallor.  Conjunctivae and lids normal.   HENT:  Normocephalic.  External ears normal.  Neck supple.     Respiratory: Normal respiratory effort.    Abdomen: obese  Skin: Warm, dry, no rashes.  Musculoskeletal: Gait is normal.  Moves all extremities well.    Extremities: normal range of motion all extremities.   Neurological: No tremors, sensation grossly intact, CN2-12 intact.  Psych:  Affect/mood is normal, judgement is good, memory is intact, grooming is appropriate.  Urinalysis:  negative except for small leukocytes.    Assessment/Plan:     Soo was seen today for uti.    Diagnoses and all orders for this visit:    Vaginal itching  -     POCT Urinalysis  -     URINE CULTURE(NEW)  -     fluconazole (DIFLUCAN) 150 MG tablet; Take one tab weekly for 2 weeks.    Dysuria  -     URINE CULTURE(NEW)  -     fluconazole (DIFLUCAN) 150 MG tablet; Take one tab weekly for 2 weeks.    Recurrent UTI  -     URINE CULTURE(NEW)  -     fluconazole (DIFLUCAN) 150 MG tablet; Take one tab weekly for 2 weeks.      We will submit urine for culture.  Concern for vaginal atrophy versus yeast infection.  Urinalysis is essentially negative except for a " small amount of leukocytes.  We will treat with Diflucan.  If culture is positive we will treat with antibiotics.  We will follow-up in 3 weeks if symptoms persist, we we will need to consider treatment for vaginal atrophy.  Patient also indicates she will have lab work done and we will follow-up with labs at that visit.      Return in about 4 weeks (around 4/5/2023), or if symptoms worsen or fail to improve, for vaginal itching..    Please note that this dictation was created using voice recognition software. I have made every reasonable attempt to correct obvious errors, but expect that there are errors of grammar and possible content that I did not discover before finalizing note.

## 2023-03-11 LAB
BACTERIA UR CULT: NORMAL
SIGNIFICANT IND 70042: NORMAL
SITE SITE: NORMAL
SOURCE SOURCE: NORMAL

## 2023-04-06 DIAGNOSIS — J30.2 SEASONAL ALLERGIES: ICD-10-CM

## 2023-04-06 DIAGNOSIS — R09.82 POST-NASAL DRIP: ICD-10-CM

## 2023-04-06 DIAGNOSIS — E03.9 ACQUIRED HYPOTHYROIDISM: ICD-10-CM

## 2023-04-06 DIAGNOSIS — G62.9 NEUROPATHY: ICD-10-CM

## 2023-04-06 RX ORDER — GABAPENTIN 300 MG/1
CAPSULE ORAL
Qty: 270 CAPSULE | Refills: 0 | Status: SHIPPED | OUTPATIENT
Start: 2023-04-06 | End: 2023-07-03

## 2023-04-06 RX ORDER — CELECOXIB 200 MG/1
CAPSULE ORAL
Qty: 30 CAPSULE | OUTPATIENT
Start: 2023-04-06

## 2023-04-06 RX ORDER — LORATADINE 10 MG/1
10 TABLET ORAL DAILY
Qty: 90 TABLET | Refills: 1 | Status: SHIPPED | OUTPATIENT
Start: 2023-04-06 | End: 2024-02-26 | Stop reason: SDUPTHER

## 2023-04-06 RX ORDER — MONTELUKAST SODIUM 10 MG/1
10 TABLET ORAL DAILY
Qty: 90 TABLET | Refills: 1 | Status: SHIPPED | OUTPATIENT
Start: 2023-04-06 | End: 2023-10-02

## 2023-04-06 RX ORDER — LEVOTHYROXINE SODIUM 0.03 MG/1
25 TABLET ORAL
Qty: 90 TABLET | Refills: 1 | Status: SHIPPED | OUTPATIENT
Start: 2023-04-06 | End: 2023-12-01

## 2023-04-06 NOTE — TELEPHONE ENCOUNTER
Received request via: Pharmacy    Was the patient seen in the last year in this department? Yes    Does the patient have an active prescription (recently filled or refills available) for medication(s) requested? No    Does the patient have half-way Plus and need 100 day supply (blood pressure, diabetes and cholesterol meds only)? Patient does not have SCP    Last OV: 3/8/23  Last labs: 5/4/22

## 2023-04-06 NOTE — TELEPHONE ENCOUNTER
Was the patient seen in the last year in this department? Yes    Does patient have an active prescription for medications requested? Yes    Received Request Via: Patient    Hospital Outpatient Visit on 03/08/2023   Component Date Value    Significant Indicator 03/08/2023 NEG     Source 03/08/2023 UR     Site 03/08/2023 -     Culture Result 03/08/2023 Usual urogenital soo ,000 cfu/mL    Hospital Outpatient Visit on 03/08/2023   Component Date Value    Eject.Frac. MOD BP 03/08/2023 58.38     Eject.Frac. MOD 4C 03/08/2023 63.17     Eject.Frac. MOD 2C 03/08/2023 57.58     Left Ventrical Ejection * 03/08/2023 60    Office Visit on 03/08/2023   Component Date Value    POC Color 03/08/2023 yellow     POC Appearance 03/08/2023 clear     POC Glucose 03/08/2023 negative     POC Bilirubin 03/08/2023 negative     POC Ketones 03/08/2023 negative     POC Specific Gravity 03/08/2023 1.025     POC Blood 03/08/2023 negative     POC Urine PH 03/08/2023 6.0     POC Protein 03/08/2023 negative     POC Urobiligen 03/08/2023 0.2     POC Nitrites 03/08/2023 negative     POC Leukocyte Esterase 03/08/2023 small    Hospital Outpatient Visit on 09/20/2022   Component Date Value    Significant Indicator 09/20/2022 NEG     Source 09/20/2022 UR     Site 09/20/2022 -     Culture Result 09/20/2022 Usual urogenital soo ,000 cfu/mL    Office Visit on 09/20/2022   Component Date Value    POC Color 09/20/2022 Yellow     POC Appearance 09/20/2022 Slightly Cloudy     POC Leukocyte Esterase 09/20/2022 Neg     POC Nitrites 09/20/2022 Neg     POC Urobiligen 09/20/2022 0.2     POC Protein 09/20/2022 Neg     POC Urine PH 09/20/2022 6.5     POC Blood 09/20/2022 Trace-intact     POC Specific Gravity 09/20/2022 1.020     POC Ketones 09/20/2022 Neg     POC Bilirubin 09/20/2022 Neg     POC Glucose 09/20/2022 Neg    Hospital Outpatient Visit on 05/04/2022   Component Date Value    Free T-4 05/04/2022 0.90 (L)     TSH 05/04/2022 3.320     Sodium  05/04/2022 139     Potassium 05/04/2022 5.0     Chloride 05/04/2022 104     Co2 05/04/2022 23     Anion Gap 05/04/2022 12.0     Glucose 05/04/2022 93     Bun 05/04/2022 13     Creatinine 05/04/2022 0.62     Calcium 05/04/2022 9.6     AST(SGOT) 05/04/2022 21     ALT(SGPT) 05/04/2022 17     Alkaline Phosphatase 05/04/2022 100 (H)     Total Bilirubin 05/04/2022 0.6     Albumin 05/04/2022 4.5     Total Protein 05/04/2022 7.6     Globulin 05/04/2022 3.1     A-G Ratio 05/04/2022 1.5     Cholesterol,Tot 05/04/2022 240 (H)     Triglycerides 05/04/2022 224 (H)     HDL 05/04/2022 45     LDL 05/04/2022 150 (H)     Hepatitis C Antibody 05/04/2022 Non-Reactive     Fasting Status 05/04/2022 Fasting     GFR (CKD-EPI) 05/04/2022 92    ]

## 2023-04-12 ENCOUNTER — OFFICE VISIT (OUTPATIENT)
Dept: MEDICAL GROUP | Facility: CLINIC | Age: 78
End: 2023-04-12
Payer: MEDICARE

## 2023-04-12 VITALS
HEIGHT: 61 IN | OXYGEN SATURATION: 92 % | DIASTOLIC BLOOD PRESSURE: 80 MMHG | BODY MASS INDEX: 39.88 KG/M2 | HEART RATE: 91 BPM | SYSTOLIC BLOOD PRESSURE: 152 MMHG | TEMPERATURE: 100.2 F | RESPIRATION RATE: 20 BRPM | WEIGHT: 211.2 LBS

## 2023-04-12 DIAGNOSIS — J20.9 ACUTE EXACERBATION OF CHRONIC BRONCHITIS (HCC): ICD-10-CM

## 2023-04-12 DIAGNOSIS — U07.1 COVID-19: Primary | ICD-10-CM

## 2023-04-12 DIAGNOSIS — R05.1 ACUTE COUGH: ICD-10-CM

## 2023-04-12 DIAGNOSIS — J42 ACUTE EXACERBATION OF CHRONIC BRONCHITIS (HCC): ICD-10-CM

## 2023-04-12 DIAGNOSIS — R69 ILLNESS: ICD-10-CM

## 2023-04-12 DIAGNOSIS — R05.9 COUGH IN ADULT: ICD-10-CM

## 2023-04-12 LAB
FLUAV RNA SPEC QL NAA+PROBE: NEGATIVE
FLUBV RNA SPEC QL NAA+PROBE: NEGATIVE
RSV RNA SPEC QL NAA+PROBE: NEGATIVE
S PYO DNA SPEC NAA+PROBE: NOT DETECTED
SARS-COV-2 RNA RESP QL NAA+PROBE: POSITIVE

## 2023-04-12 PROCEDURE — 0241U POCT CEPHEID COV-2, FLU A/B, RSV - PCR: CPT | Performed by: PHYSICIAN ASSISTANT

## 2023-04-12 PROCEDURE — 99214 OFFICE O/P EST MOD 30 MIN: CPT | Mod: CS | Performed by: PHYSICIAN ASSISTANT

## 2023-04-12 PROCEDURE — 87651 STREP A DNA AMP PROBE: CPT | Performed by: PHYSICIAN ASSISTANT

## 2023-04-12 RX ORDER — IPRATROPIUM BROMIDE AND ALBUTEROL SULFATE 2.5; .5 MG/3ML; MG/3ML
3 SOLUTION RESPIRATORY (INHALATION) 4 TIMES DAILY
Qty: 300 ML | Refills: 2 | Status: SHIPPED | OUTPATIENT
Start: 2023-04-12 | End: 2023-04-27 | Stop reason: SDUPTHER

## 2023-04-12 NOTE — PROGRESS NOTES
Cc:  cough    Subjective:     Soo Huff is a 77 y.o. female presenting for cough      Patient presents to the office for cough.  Patient states that her symptoms started yesterday.  She has been congested nasally.  She states that she has a sore throat as she is breathing through her mouth and has been sitting up to breathe.  She states that she does feel achy. She will feel clammy and sweaty when she coughs. Patient is also needing refills of her duoneb.  She does indicate sitting next to someone who was ill in the last several days.      Review of systems:  See above.   Denies any symptoms unless previously indicated.        Current Outpatient Medications:     ipratropium-albuterol (DUONEB) 0.5-2.5 (3) MG/3ML nebulizer solution, Take 3 mL by nebulization 4 times a day., Disp: 300 mL, Rfl: 2    montelukast (SINGULAIR) 10 MG Tab, Take 1 Tablet by mouth every day., Disp: 90 Tablet, Rfl: 1    loratadine (CLARITIN) 10 MG Tab, Take 1 Tablet by mouth every day., Disp: 90 Tablet, Rfl: 1    levothyroxine (SYNTHROID) 25 MCG Tab, Take 1 Tablet by mouth every morning on an empty stomach., Disp: 90 Tablet, Rfl: 1    gabapentin (NEURONTIN) 300 MG Cap, TAKE ONE CAPSULE BY MOUTH THREE TIMES A DAY, Disp: 270 Capsule, Rfl: 0    omeprazole (PRILOSEC) 20 MG delayed-release capsule, Take 1 Capsule by mouth every day., Disp: 30 Capsule, Rfl: 0    fluticasone-salmeterol (ADVAIR) 500-50 MCG/ACT AEROSOL POWDER, BREATH ACTIVATED, Inhale 1 Puff every 12 hours., Disp: 180 Each, Rfl: 1    fluticasone (FLONASE) 50 MCG/ACT nasal spray, SPRAY ONE SPRAY IN THE AFFECTED NOSTRIL(S) DAILY, Disp: 16 mL, Rfl: 3    sertraline (ZOLOFT) 100 MG Tab, TAKE ONE TABLET BY MOUTH DAILY (Patient taking differently: Take 50 mg by mouth every day.), Disp: 90 Tablet, Rfl: 0    fluconazole (DIFLUCAN) 150 MG tablet, Take one tab weekly for 2 weeks. (Patient not taking: Reported on 4/12/2023), Disp: 2 Tablet, Rfl: 1    methylPREDNISolone (MEDROL DOSEPAK) 4 MG  "Tablet Therapy Pack, As directed on the packaging label. (Patient not taking: Reported on 3/8/2023), Disp: 21 Tablet, Rfl: 0    traMADol (ULTRAM) 50 MG Tab, tramadol 50 mg tablet  Take 1 tablet every 6 hours by oral route as needed. (Patient not taking: Reported on 3/8/2023), Disp: , Rfl:     rosuvastatin (CRESTOR) 10 MG Tab, Take 1 Tablet by mouth every evening. (Patient not taking: Reported on 4/12/2023), Disp: 30 Tablet, Rfl: 11    Allergies, past medical history, past surgical history, family history, social history reviewed and updated    Objective:     Vitals: BP (!) 152/80 (BP Location: Left arm, Patient Position: Sitting, BP Cuff Size: Adult)   Pulse 91   Temp 37.9 °C (100.2 °F) (Temporal)   Resp 20   Ht 1.549 m (5' 1\")   Wt 95.8 kg (211 lb 3.2 oz)   SpO2 92%   BMI 39.91 kg/m²   General: Alert, pleasant,appears ill and sounds congested.  Coughing in office.  Feels warm.    EYES:   PERRL, EOMI, no icterus or pallor.  Conjunctivae and lids normal.   HENT:  Normocephalic.  External ears normal. Oropharynx non-erythematous, mucous membranes moist.  Neck supple.    Heart: Regular rate and rhythm.  S1 and S2 normal.  No murmurs appreciated.  Respiratory: coughing.  Decreased breath sounds all lung fields..    Abdomen: obese  Skin: Warm, dry, no rashes.  Musculoskeletal: Gait is normal.  Moves all extremities well.    Extremities: normal range of motion all extremities.   Neurological: No tremors, sensation grossly intact,  CN2-12 intact.  Psych:  Affect/mood is normal, judgement is good, memory is intact, grooming is appropriate.    Assessment/Plan:     Soo was seen today for cough.    Diagnoses and all orders for this visit:    Cough in adult  -     POCT Cepheid Group A Strep - PCR  -     POCT Cepheid CoV-2, Flu A/B, RSV - PCR    Acute exacerbation of chronic bronchitis (HCC)  -     ipratropium-albuterol (DUONEB) 0.5-2.5 (3) MG/3ML nebulizer solution; Take 3 mL by nebulization 4 times a day.    Acute " cough    Illness      COVID flu and RSV along with strep testing is pending.  Patient did not want to wait the time allotted for test to run.  Therefore we will send a [employer] message with the test results and treat based on test results.  All medications were discussed with patient including Tamiflu, Paxlovid, steroids and antibiotics.  As patient does have COPD, if all testing is negative, will treat with antibiotics and steroids for bronchitis.    Addendum 4- at 12:00 PM: Testing was positive for COVID.  We will send message to patient with an Rx for Paxlovid.      No follow-ups on file.    Please note that this dictation was created using voice recognition software. I have made every reasonable attempt to correct obvious errors, but expect that there are errors of grammar and possible content that I did not discover before finalizing note.

## 2023-04-26 ENCOUNTER — OFFICE VISIT (OUTPATIENT)
Dept: MEDICAL GROUP | Facility: CLINIC | Age: 78
End: 2023-04-26
Payer: MEDICARE

## 2023-04-26 VITALS
HEIGHT: 61 IN | DIASTOLIC BLOOD PRESSURE: 78 MMHG | HEART RATE: 76 BPM | BODY MASS INDEX: 38.78 KG/M2 | OXYGEN SATURATION: 92 % | RESPIRATION RATE: 16 BRPM | TEMPERATURE: 98.6 F | WEIGHT: 205.4 LBS | SYSTOLIC BLOOD PRESSURE: 122 MMHG

## 2023-04-26 DIAGNOSIS — J45.30 MILD PERSISTENT ASTHMA WITHOUT COMPLICATION: ICD-10-CM

## 2023-04-26 DIAGNOSIS — J20.9 ACUTE EXACERBATION OF CHRONIC BRONCHITIS (HCC): ICD-10-CM

## 2023-04-26 DIAGNOSIS — J42 ACUTE EXACERBATION OF CHRONIC BRONCHITIS (HCC): ICD-10-CM

## 2023-04-26 DIAGNOSIS — F32.0 CURRENT MILD EPISODE OF MAJOR DEPRESSIVE DISORDER WITHOUT PRIOR EPISODE (HCC): ICD-10-CM

## 2023-04-26 DIAGNOSIS — E66.9 OBESITY (BMI 30-39.9): ICD-10-CM

## 2023-04-26 DIAGNOSIS — U07.1 COVID-19: ICD-10-CM

## 2023-04-26 PROCEDURE — 99214 OFFICE O/P EST MOD 30 MIN: CPT | Performed by: PHYSICIAN ASSISTANT

## 2023-04-26 RX ORDER — METHYLPREDNISOLONE 4 MG/1
TABLET ORAL
Qty: 21 TABLET | Refills: 0 | Status: SHIPPED
Start: 2023-04-26 | End: 2023-05-30

## 2023-04-26 NOTE — PROGRESS NOTES
cc:  post covid cough    Subjective:     Soo Huff is a 77 y.o. female presenting for post covid cough      Patient presents to the office for post covid cough. She is back on the advair.  She states that the cough is a little better but she will still having coughing spasming.  She states that the medrol dose pack did help while she was on it. She does feel that the paxlovid did help.  Along with COPD, patient also has asthma.  Patient is also morbidly obese with increased risk due to COPD.  She also has chronic major depression mild and is currently not on medication at this time.    Review of systems:  See above.   Denies any symptoms unless previously indicated.        Current Outpatient Medications:     methylPREDNISolone (MEDROL DOSEPAK) 4 MG Tablet Therapy Pack, As directed on the packaging label., Disp: 21 Tablet, Rfl: 0    ipratropium-albuterol (DUONEB) 0.5-2.5 (3) MG/3ML nebulizer solution, Take 3 mL by nebulization 4 times a day., Disp: 300 mL, Rfl: 2    montelukast (SINGULAIR) 10 MG Tab, Take 1 Tablet by mouth every day., Disp: 90 Tablet, Rfl: 1    loratadine (CLARITIN) 10 MG Tab, Take 1 Tablet by mouth every day., Disp: 90 Tablet, Rfl: 1    levothyroxine (SYNTHROID) 25 MCG Tab, Take 1 Tablet by mouth every morning on an empty stomach., Disp: 90 Tablet, Rfl: 1    gabapentin (NEURONTIN) 300 MG Cap, TAKE ONE CAPSULE BY MOUTH THREE TIMES A DAY, Disp: 270 Capsule, Rfl: 0    fluticasone-salmeterol (ADVAIR) 500-50 MCG/ACT AEROSOL POWDER, BREATH ACTIVATED, Inhale 1 Puff every 12 hours., Disp: 180 Each, Rfl: 1    fluticasone (FLONASE) 50 MCG/ACT nasal spray, SPRAY ONE SPRAY IN THE AFFECTED NOSTRIL(S) DAILY, Disp: 16 mL, Rfl: 3    Nirmatrelvir&Ritonavir 300/100 20 x 150 MG & 10 x 100MG Tablet Therapy Pack, Take 300 mg nirmatrelvir (two 150 mg tablets) with 100 mg ritonavir (one 100 mg tablet) by mouth, with all three tablets taken together twice daily for 5 days. (Patient not taking: Reported on 4/26/2023),  "Disp: 30 Each, Rfl: 0    fluconazole (DIFLUCAN) 150 MG tablet, Take one tab weekly for 2 weeks. (Patient not taking: Reported on 4/12/2023), Disp: 2 Tablet, Rfl: 1    omeprazole (PRILOSEC) 20 MG delayed-release capsule, Take 1 Capsule by mouth every day. (Patient not taking: Reported on 4/26/2023), Disp: 30 Capsule, Rfl: 0    traMADol (ULTRAM) 50 MG Tab, tramadol 50 mg tablet  Take 1 tablet every 6 hours by oral route as needed. (Patient not taking: Reported on 3/8/2023), Disp: , Rfl:     rosuvastatin (CRESTOR) 10 MG Tab, Take 1 Tablet by mouth every evening. (Patient not taking: Reported on 4/12/2023), Disp: 30 Tablet, Rfl: 11    sertraline (ZOLOFT) 100 MG Tab, TAKE ONE TABLET BY MOUTH DAILY (Patient not taking: Reported on 4/26/2023), Disp: 90 Tablet, Rfl: 0    Allergies, past medical history, past surgical history, family history, social history reviewed and updated    Objective:     Vitals: /78 (BP Location: Left arm, Patient Position: Sitting, BP Cuff Size: Adult)   Pulse 76   Temp 37 °C (98.6 °F) (Temporal)   Resp 16   Ht 1.549 m (5' 1\")   Wt 93.2 kg (205 lb 6.4 oz)   SpO2 92%   BMI 38.81 kg/m²   General: Alert, pleasant, NAD  EYES:   PERRL, EOMI, no icterus or pallor.  Conjunctivae and lids normal.   HENT:  Normocephalic.  External ears normal.  Neck supple.     Heart: Regular rate and rhythm.  S1 and S2 normal.  No murmurs appreciated.  Respiratory: Decreased absent breath sounds all lung fields.  Abdomen: Non-distended, soft, non-tender, no  No hernias.  Skin: Warm, dry, no rashes.  Musculoskeletal: Gait is normal.  Moves all extremities well.    Extremities: Normal range of motion all extremities.   Neurological: No tremors, sensation grossly intact, CN2-12 intact.  Psych:  Affect/mood is normal, judgement is good, memory is intact, grooming is appropriate.    Assessment/Plan:     Soo was seen today for cough.    Diagnoses and all orders for this visit:    Acute exacerbation of chronic " bronchitis (HCC)  -     methylPREDNISolone (MEDROL DOSEPAK) 4 MG Tablet Therapy Pack; As directed on the packaging label.    Mild persistent asthma without complication  -     methylPREDNISolone (MEDROL DOSEPAK) 4 MG Tablet Therapy Pack; As directed on the packaging label.    COVID-19      Improved from COVID.  However patient continues to cough and she does have decreased lung sounds.  We will start patient on a Medrol Dosepak.  She will continue with her Advair inhaler and nebulizer treatments.  We will follow-up in 2 weeks and she will contact us sooner with any issues or concerns.    HCC Gap Form    Comorbidity Diagnosis: Acute exacerbation of chronic bronchitis (HCC)  Assessment and plan: Chronic, stable.  Follow up every 4-6 months.   The comorbid condition is stable based on today's assessment. Continue current treatment plan. Follow up in 4 months.  Diagnosis to address: F32.0 - Current mild episode of major depressive disorder without prior episode (HCC)  Assessment and plan: Chronic, stable. Continue with current defined treatment plan: Currently not needing medication.. Follow-up every 6 months.  Last edited 04/26/23 11:26 PDT by Sagrario Marley P.A.-C.            Return in about 2 weeks (around 5/10/2023), or if symptoms worsen or fail to improve.    Please note that this dictation was created using voice recognition software. I have made every reasonable attempt to correct obvious errors, but expect that there are errors of grammar and possible content that I did not discover before finalizing note.

## 2023-04-27 ENCOUNTER — TELEPHONE (OUTPATIENT)
Dept: MEDICAL GROUP | Facility: CLINIC | Age: 78
End: 2023-04-27
Payer: MEDICARE

## 2023-04-27 DIAGNOSIS — J20.9 ACUTE EXACERBATION OF CHRONIC BRONCHITIS (HCC): ICD-10-CM

## 2023-04-27 DIAGNOSIS — J42 ACUTE EXACERBATION OF CHRONIC BRONCHITIS (HCC): ICD-10-CM

## 2023-04-27 RX ORDER — IPRATROPIUM BROMIDE AND ALBUTEROL SULFATE 2.5; .5 MG/3ML; MG/3ML
3 SOLUTION RESPIRATORY (INHALATION) 4 TIMES DAILY
Qty: 300 ML | Refills: 2 | Status: SHIPPED | OUTPATIENT
Start: 2023-04-27 | End: 2024-03-18

## 2023-04-27 NOTE — TELEPHONE ENCOUNTER
VOICEMAIL  1. Caller Name: Kaleb                        Call Back Number: [phone] Ex: [id number]    2. Message: Caller states they received prescription for Albuterol-Nebulizer medication. They spoke to pt who states she takes DUONEB. Caller is requesting to send a different request for DUONEB instead.     3. Patient approves office to leave a detailed voicemail/[employer] message: N\A

## 2023-05-04 DIAGNOSIS — F32.0 CURRENT MILD EPISODE OF MAJOR DEPRESSIVE DISORDER WITHOUT PRIOR EPISODE (HCC): ICD-10-CM

## 2023-05-04 DIAGNOSIS — F41.9 ANXIETY: ICD-10-CM

## 2023-05-05 RX ORDER — CELECOXIB 200 MG/1
CAPSULE ORAL
Qty: 30 CAPSULE | Refills: 1 | Status: SHIPPED | OUTPATIENT
Start: 2023-05-05 | End: 2023-09-05

## 2023-05-05 RX ORDER — SERTRALINE HYDROCHLORIDE 100 MG/1
TABLET, FILM COATED ORAL
Qty: 90 TABLET | Refills: 0 | Status: SHIPPED | OUTPATIENT
Start: 2023-05-05

## 2023-05-15 ENCOUNTER — TELEPHONE (OUTPATIENT)
Dept: HEALTH INFORMATION MANAGEMENT | Facility: OTHER | Age: 78
End: 2023-05-15
Payer: MEDICARE

## 2023-05-25 ENCOUNTER — TELEPHONE (OUTPATIENT)
Dept: MEDICAL GROUP | Facility: CLINIC | Age: 78
End: 2023-05-25
Payer: MEDICARE

## 2023-05-26 NOTE — TELEPHONE ENCOUNTER
Soo came into office and stated that she has been without her nebulizer for around a week and no body seems to know what going on with it.     I called [facility] but no one answered, I left a voicemail asking them to call us back regarding a patient order she hasn't heard about and I will try calling them again tomorrow to find out if they received the order.

## 2023-05-26 NOTE — TELEPHONE ENCOUNTER
Called patient to ask, unable to reach left vm and asked patient to give us a call or send us a my chart message. Will try to call patient again later today

## 2023-05-26 NOTE — TELEPHONE ENCOUNTER
I called [facility] again, spoke to Kaylah, she stated the patient already had a nebulizer delivered on 2/8/2023. She did see a delay in getting the medication shipped to the patient, Kaylah stated she isn't sure what caused the delay on their end as no notes were made in their system regarding this and she will escalate that. Kaylah stated that Soo's medication is set to ship today and she should receive it by tomorrow because it ships overnight. I will call patient to inform her, and confirm this is what she needed.

## 2023-05-30 ENCOUNTER — OFFICE VISIT (OUTPATIENT)
Dept: MEDICAL GROUP | Facility: CLINIC | Age: 78
End: 2023-05-30
Payer: MEDICARE

## 2023-05-30 VITALS
BODY MASS INDEX: 38.82 KG/M2 | HEART RATE: 82 BPM | HEIGHT: 61 IN | DIASTOLIC BLOOD PRESSURE: 68 MMHG | WEIGHT: 205.6 LBS | SYSTOLIC BLOOD PRESSURE: 118 MMHG | OXYGEN SATURATION: 92 % | RESPIRATION RATE: 16 BRPM | TEMPERATURE: 98.3 F

## 2023-05-30 DIAGNOSIS — J42 ACUTE EXACERBATION OF CHRONIC BRONCHITIS (HCC): ICD-10-CM

## 2023-05-30 DIAGNOSIS — Z00.00 MEDICARE ANNUAL WELLNESS VISIT, SUBSEQUENT: Primary | ICD-10-CM

## 2023-05-30 DIAGNOSIS — J20.9 ACUTE EXACERBATION OF CHRONIC BRONCHITIS (HCC): ICD-10-CM

## 2023-05-30 DIAGNOSIS — E78.49 OTHER HYPERLIPIDEMIA: ICD-10-CM

## 2023-05-30 DIAGNOSIS — E66.01 MORBID (SEVERE) OBESITY DUE TO EXCESS CALORIES (HCC): ICD-10-CM

## 2023-05-30 PROCEDURE — 3074F SYST BP LT 130 MM HG: CPT | Performed by: PHYSICIAN ASSISTANT

## 2023-05-30 PROCEDURE — G0439 PPPS, SUBSEQ VISIT: HCPCS | Performed by: PHYSICIAN ASSISTANT

## 2023-05-30 PROCEDURE — 3078F DIAST BP <80 MM HG: CPT | Performed by: PHYSICIAN ASSISTANT

## 2023-05-30 RX ORDER — ROSUVASTATIN CALCIUM 10 MG/1
10 TABLET, COATED ORAL EVERY EVENING
Qty: 90 TABLET | Refills: 2 | Status: SHIPPED | OUTPATIENT
Start: 2023-05-30

## 2023-05-30 SDOH — ECONOMIC STABILITY: HOUSING INSECURITY
IN THE LAST 12 MONTHS, WAS THERE A TIME WHEN YOU DID NOT HAVE A STEADY PLACE TO SLEEP OR SLEPT IN A SHELTER (INCLUDING NOW)?: NO

## 2023-05-30 SDOH — ECONOMIC STABILITY: TRANSPORTATION INSECURITY
IN THE PAST 12 MONTHS, HAS LACK OF TRANSPORTATION KEPT YOU FROM MEETINGS, WORK, OR FROM GETTING THINGS NEEDED FOR DAILY LIVING?: NO

## 2023-05-30 SDOH — ECONOMIC STABILITY: FOOD INSECURITY: WITHIN THE PAST 12 MONTHS, THE FOOD YOU BOUGHT JUST DIDN'T LAST AND YOU DIDN'T HAVE MONEY TO GET MORE.: NEVER TRUE

## 2023-05-30 SDOH — ECONOMIC STABILITY: INCOME INSECURITY: IN THE LAST 12 MONTHS, WAS THERE A TIME WHEN YOU WERE NOT ABLE TO PAY THE MORTGAGE OR RENT ON TIME?: NO

## 2023-05-30 SDOH — ECONOMIC STABILITY: TRANSPORTATION INSECURITY
IN THE PAST 12 MONTHS, HAS THE LACK OF TRANSPORTATION KEPT YOU FROM MEDICAL APPOINTMENTS OR FROM GETTING MEDICATIONS?: NO

## 2023-05-30 SDOH — ECONOMIC STABILITY: FOOD INSECURITY: WITHIN THE PAST 12 MONTHS, YOU WORRIED THAT YOUR FOOD WOULD RUN OUT BEFORE YOU GOT MONEY TO BUY MORE.: NEVER TRUE

## 2023-05-30 SDOH — HEALTH STABILITY: PHYSICAL HEALTH: ON AVERAGE, HOW MANY MINUTES DO YOU ENGAGE IN EXERCISE AT THIS LEVEL?: 70 MIN

## 2023-05-30 SDOH — HEALTH STABILITY: PHYSICAL HEALTH: ON AVERAGE, HOW MANY DAYS PER WEEK DO YOU ENGAGE IN MODERATE TO STRENUOUS EXERCISE (LIKE A BRISK WALK)?: 4 DAYS

## 2023-05-30 SDOH — ECONOMIC STABILITY: INCOME INSECURITY: HOW HARD IS IT FOR YOU TO PAY FOR THE VERY BASICS LIKE FOOD, HOUSING, MEDICAL CARE, AND HEATING?: NOT VERY HARD

## 2023-05-30 SDOH — HEALTH STABILITY: MENTAL HEALTH
STRESS IS WHEN SOMEONE FEELS TENSE, NERVOUS, ANXIOUS, OR CAN'T SLEEP AT NIGHT BECAUSE THEIR MIND IS TROUBLED. HOW STRESSED ARE YOU?: ONLY A LITTLE

## 2023-05-30 SDOH — ECONOMIC STABILITY: TRANSPORTATION INSECURITY
IN THE PAST 12 MONTHS, HAS LACK OF RELIABLE TRANSPORTATION KEPT YOU FROM MEDICAL APPOINTMENTS, MEETINGS, WORK OR FROM GETTING THINGS NEEDED FOR DAILY LIVING?: NO

## 2023-05-30 SDOH — ECONOMIC STABILITY: HOUSING INSECURITY: IN THE LAST 12 MONTHS, HOW MANY PLACES HAVE YOU LIVED?: 1

## 2023-05-30 ASSESSMENT — SOCIAL DETERMINANTS OF HEALTH (SDOH)
HOW OFTEN DO YOU GET TOGETHER WITH FRIENDS OR RELATIVES?: ONCE A WEEK
DO YOU BELONG TO ANY CLUBS OR ORGANIZATIONS SUCH AS CHURCH GROUPS UNIONS, FRATERNAL OR ATHLETIC GROUPS, OR SCHOOL GROUPS?: NO
HOW HARD IS IT FOR YOU TO PAY FOR THE VERY BASICS LIKE FOOD, HOUSING, MEDICAL CARE, AND HEATING?: NOT VERY HARD
DO YOU BELONG TO ANY CLUBS OR ORGANIZATIONS SUCH AS CHURCH GROUPS UNIONS, FRATERNAL OR ATHLETIC GROUPS, OR SCHOOL GROUPS?: NO
IN A TYPICAL WEEK, HOW MANY TIMES DO YOU TALK ON THE PHONE WITH FAMILY, FRIENDS, OR NEIGHBORS?: MORE THAN THREE TIMES A WEEK
HOW OFTEN DO YOU ATTENT MEETINGS OF THE CLUB OR ORGANIZATION YOU BELONG TO?: MORE THAN 4 TIMES PER YEAR
HOW MANY DRINKS CONTAINING ALCOHOL DO YOU HAVE ON A TYPICAL DAY WHEN YOU ARE DRINKING: 1 OR 2
HOW OFTEN DO YOU ATTEND CHURCH OR RELIGIOUS SERVICES?: MORE THAN 4 TIMES PER YEAR
HOW OFTEN DO YOU ATTEND CHURCH OR RELIGIOUS SERVICES?: MORE THAN 4 TIMES PER YEAR
HOW OFTEN DO YOU ATTENT MEETINGS OF THE CLUB OR ORGANIZATION YOU BELONG TO?: MORE THAN 4 TIMES PER YEAR
WITHIN THE PAST 12 MONTHS, YOU WORRIED THAT YOUR FOOD WOULD RUN OUT BEFORE YOU GOT THE MONEY TO BUY MORE: NEVER TRUE
IN A TYPICAL WEEK, HOW MANY TIMES DO YOU TALK ON THE PHONE WITH FAMILY, FRIENDS, OR NEIGHBORS?: MORE THAN THREE TIMES A WEEK
HOW OFTEN DO YOU HAVE A DRINK CONTAINING ALCOHOL: MONTHLY OR LESS
HOW OFTEN DO YOU HAVE SIX OR MORE DRINKS ON ONE OCCASION: NEVER
HOW OFTEN DO YOU GET TOGETHER WITH FRIENDS OR RELATIVES?: ONCE A WEEK

## 2023-05-30 ASSESSMENT — LIFESTYLE VARIABLES
AUDIT-C TOTAL SCORE: 1
HOW MANY STANDARD DRINKS CONTAINING ALCOHOL DO YOU HAVE ON A TYPICAL DAY: 1 OR 2
HOW OFTEN DO YOU HAVE SIX OR MORE DRINKS ON ONE OCCASION: NEVER
SKIP TO QUESTIONS 9-10: 1
HOW OFTEN DO YOU HAVE A DRINK CONTAINING ALCOHOL: MONTHLY OR LESS

## 2023-05-30 ASSESSMENT — ACTIVITIES OF DAILY LIVING (ADL): BATHING_REQUIRES_ASSISTANCE: 0

## 2023-05-30 ASSESSMENT — ENCOUNTER SYMPTOMS: GENERAL WELL-BEING: GOOD

## 2023-05-30 ASSESSMENT — PATIENT HEALTH QUESTIONNAIRE - PHQ9: CLINICAL INTERPRETATION OF PHQ2 SCORE: 0

## 2023-05-30 NOTE — PROGRESS NOTES
Chief Complaint   Patient presents with    Medicare Annual Wellness       HPI:  Soo Huff is a 77 y.o. here for Medicare Annual Wellness Visit     Patient Active Problem List    Diagnosis Date Noted    Morbid (severe) obesity due to excess calories ([facility]) 05/30/2023    Acute cough 04/12/2023    Illness 04/12/2023    Dyspepsia 02/09/2023    Acute exacerbation of chronic bronchitis (HCC) 02/02/2023    Paresthesia 12/22/2022    Tremor 12/19/2022    Pain of right thumb 12/19/2022    Weakness of right hand 12/02/2022    Full thickness rotator cuff tear 10/19/2022    Idiopathic hematuria 09/20/2022    Hypoxia 09/15/2022    Cardiomegaly 09/15/2022    Former smoker 09/15/2022    Asthma     Irritable bowel syndrome with both constipation and diarrhea 06/23/2022    Body mass index (BMI) 38.0-38.9, adult     Cystitis 02/08/2022    Rotator cuff tear arthropathy of right shoulder 02/08/2022    Skin lesion 11/15/2021    Chronic right shoulder pain 11/15/2021    Neck pain 11/15/2021    Pain in both hands 11/15/2021    Allergies 07/26/2021    Seasonal allergies 07/26/2021    Acquired hypothyroidism 07/19/2021    Left lower quadrant abdominal pain 07/19/2021    Vaginal atrophy 06/28/2021    Post-nasal drip 06/28/2021    Chronic pain of right knee 06/28/2021    Functional diarrhea 04/01/2021    Plantar fasciitis 04/01/2021    Other hyperlipidemia 02/16/2021    Other fatigue 02/16/2021    Iron deficiency anemia 02/16/2021    Tinea corporis 02/03/2021    Mild persistent asthma without complication 01/04/2021    Neuropathy 01/04/2021    Current mild episode of major depressive disorder without prior episode (HCC) 01/04/2021    Anxiety 01/04/2021    Arthritis 01/04/2021    Recurrent UTI 10/09/2019    Frequency of micturition 10/09/2019    Dysuria 10/09/2019       Current Outpatient Medications   Medication Sig Dispense Refill    rosuvastatin (CRESTOR) 10 MG Tab Take 1 Tablet by mouth every evening. 90 Tablet 2    sertraline (ZOLOFT)  100 MG Tab TAKE ONE TABLET BY MOUTH DAILY 90 Tablet 0    celecoxib (CELEBREX) 200 MG Cap TAKE ONE CAPSULE BY MOUTH DAILY 30 Capsule 1    ipratropium-albuterol (DUONEB) 0.5-2.5 (3) MG/3ML nebulizer solution Take 3 mL by nebulization 4 times a day. 300 mL 2    montelukast (SINGULAIR) 10 MG Tab Take 1 Tablet by mouth every day. 90 Tablet 1    loratadine (CLARITIN) 10 MG Tab Take 1 Tablet by mouth every day. 90 Tablet 1    levothyroxine (SYNTHROID) 25 MCG Tab Take 1 Tablet by mouth every morning on an empty stomach. 90 Tablet 1    gabapentin (NEURONTIN) 300 MG Cap TAKE ONE CAPSULE BY MOUTH THREE TIMES A  Capsule 0    fluticasone-salmeterol (ADVAIR) 500-50 MCG/ACT AEROSOL POWDER, BREATH ACTIVATED Inhale 1 Puff every 12 hours. 180 Each 1    fluticasone (FLONASE) 50 MCG/ACT nasal spray SPRAY ONE SPRAY IN THE AFFECTED NOSTRIL(S) DAILY 16 mL 3    omeprazole (PRILOSEC) 20 MG delayed-release capsule Take 1 Capsule by mouth every day. (Patient not taking: Reported on 4/26/2023) 30 Capsule 0    traMADol (ULTRAM) 50 MG Tab tramadol 50 mg tablet   Take 1 tablet every 6 hours by oral route as needed. (Patient not taking: Reported on 3/8/2023)       No current facility-administered medications for this visit.          Current supplements as per medication list.     Allergies: Ciprofloxacin, Doxycycline, and Hydrocodone-acetaminophen    Current social contact/activities: Gardening, walking, [employer].    She  reports that she has quit smoking. Her smoking use included cigarettes. She has never used smokeless tobacco. She reports that she does not currently use alcohol. She reports that she does not use drugs.  Counseling given: Not Answered  Tobacco comments: quit at the age of 27      ROS:    Gait: Uses no assistive device  Ostomy: No  Other tubes: No  Amputations: No  Chronic oxygen use: No  Last eye exam: 2022  Wears hearing aids: Yes   : Denies any urinary leakage during the last 6 months    Screening:    Depression  Screening  Little interest or pleasure in doing things?  0 - not at all  Feeling down, depressed , or hopeless? 0 - not at all  Patient Health Questionnaire Score: 0     If depressive symptoms identified deferred to follow up visit unless specifically addressed in assessment and plan.    Interpretation of PHQ-9 Total Score   Score Severity   1-4 No Depression   5-9 Mild Depression   10-14 Moderate Depression   15-19 Moderately Severe Depression   20-27 Severe Depression    Screening for Cognitive Impairment  Three Minute Recall (daughter, heaven, mountain) 3/3    Augusto clock face with all 12 numbers and set the hands to show 10 past 11.  Yes    Cognitive concerns identified deferred for follow up unless specifically addressed in assessment and plan.    Fall Risk Assessment  Has the patient had two or more falls in the last year or any fall with injury in the last year?  No    Safety Assessment  Throw rugs on floor.  Yes  Handrails on all stairs.  No  Good lighting in all hallways.  Yes  Difficulty hearing.  Yes  Patient counseled about all safety risks that were identified.    Functional Assessment ADLs  Are there any barriers preventing you from cooking for yourself or meeting nutritional needs?  No.    Are there any barriers preventing you from driving safely or obtaining transportation?  No.    Are there any barriers preventing you from using a telephone or calling for help?  No.    Are there any barriers preventing you from shopping?  No.    Are there any barriers preventing you from taking care of your own finances?  No.    Are there any barriers preventing you from managing your medications?  No.    Are there any barriers preventing you from showering, bathing or dressing yourself?  No.    Are you currently engaging in any exercise or physical activity?  Yes.     What is your perception of your health?  Good    Advance Care Planning  Do you have an Advance Directive, Living Will, Durable Power of , or  POLST? No                 Health Maintenance Summary            Overdue - BONE DENSITY (Every 5 Years) Overdue - never done      No completion history exists for this topic.              Overdue - Annual Pulmonary Function Test / Spirometry (Yearly) Overdue - never done      No completion history exists for this topic.              Overdue - IMM ZOSTER VACCINES (1 of 2) Overdue - never done      No completion history exists for this topic.              Overdue - COVID-19 Vaccine (5 - Booster for Moderna series) Overdue since 6/15/2022      04/20/2022  Imm Admin: MODERNA SARS-COV-2 VACCINE (12+)    12/13/2021  Imm Admin: MODERNA SARS-COV-2 VACCINE (12+)    03/24/2021  Imm Admin: MODERNA SARS-COV-2 VACCINE (12+)    02/24/2021  Imm Admin: MODERNA SARS-COV-2 VACCINE (12+)              Annual Wellness Visit (Every 366 Days) Next due on 5/30/2024 05/30/2023  Visit Dx: Medicare annual wellness visit, subsequent              IMM DTaP/Tdap/Td Vaccine (3 - Td or Tdap) Next due on 4/26/2032 04/26/2022  Imm Admin: Tdap Vaccine    05/21/2008  Imm Admin: Tdap Vaccine              IMM PNEUMOCOCCAL VACCINE: 65+ Years (Series Information) Completed      04/26/2022  Imm Admin: Pneumococcal polysaccharide vaccine (PPSV-23)    01/19/2020  Imm Admin: Pneumococcal Conjugate Vaccine (Prevnar/PCV-13)              HEPATITIS C SCREENING  Completed      05/04/2022  HEP C VIRUS ANTIBODY              IMM INFLUENZA (Series Information) Completed      09/29/2022  Imm Admin: Influenza Vaccine Adult HD    12/13/2021  Imm Admin: Influenza, Unspecified - HISTORICAL DATA    01/04/2021  Imm Admin: Influenza Vaccine Adult HD    10/07/2020  Imm Admin: Influenza (IM) Preservative Free - HISTORICAL DATA              IMM HEP B VACCINE (Series Information) Aged Out      No completion history exists for this topic.              HPV Vaccines (Series Information) Aged Out      No completion history exists for this topic.              IMM  MENINGOCOCCAL ACWY VACCINE (Series Information) Aged Out      No completion history exists for this topic.                    Patient Care Team:  Sagrario Marley P.A.-C. as PCP - General (Physician Assistant)        Social History     Tobacco Use    Smoking status: Former     Types: Cigarettes    Smokeless tobacco: Never    Tobacco comments:     quit at the age of 27   Vaping Use    Vaping Use: Never used   Substance Use Topics    Alcohol use: Not Currently     Comment: occ glass of wine with a celebration of holidays or birthdays    Drug use: No     Family History   Problem Relation Age of Onset    Diabetes Mother     Diabetes Maternal Aunt     Diabetes Paternal Aunt     Diabetes Daughter     Leukemia Son      She  has a past medical history of Anxiety, Arthritis, Asthma, Bowel habit changes, Cataract, COVID-19 (4/12/2023), Dental disorder, Disorder of thyroid, Heart murmur, High cholesterol, tonsillectomy, Pain, Psychiatric problem, Shingles, and Urinary incontinence.   Past Surgical History:   Procedure Laterality Date    PB SHLDR ARTHROSCOP,SURG,W/ROTAT CUFF REPB Right 9/12/2022    Procedure: RIGHT SHOULDER ARTHROSCOPY ROTATOR CUFF REPAIR, SUBACROMIAL DECOMPRESSION, LABRAL DEBRIDEMENT, BICEPS TENOTOMY;  Surgeon: Bhupendra Campbell M.D.;  Location: [facility];  Service: Orthopedics    TX SHLDR ARTHROSCOP,PART ACROMIOPLAS Right 9/12/2022    Procedure: DECOMPRESSION, SHOULDER, ARTHROSCOPIC;  Surgeon: Bhupendra Campbell M.D.;  Location: [facility];  Service: Orthopedics    TX SHLDR ARTHROSCOP EXTEN DEBRIDE 3+ Right 9/12/2022    Procedure: ARTHROSCOPY, SHOULDER, WITH EXTENSIVE DEBRIDEMENT;  Surgeon: Bhupendra Campbell M.D.;  Location: [facility];  Service: Orthopedics    TX UNLISTED PROC, ARTHROSCOPY Right 9/12/2022    Procedure: ARTHROSCOPY, SHOULDER, WITH BICEPS TENOTOMY;  Surgeon: Bhupendra Campbell M.D.;  Location: [facility];  Service: Orthopedics    HYSTERECTOMY LAPAROSCOPY      OTHER  "Bilateral     Breast cyst excision in my 30's       Exam:   /68 (BP Location: Left arm, Patient Position: Sitting, BP Cuff Size: Adult)   Pulse 82   Temp 36.8 °C (98.3 °F) (Temporal)   Resp 16   Ht 1.549 m (5' 1\")   Wt 93.3 kg (205 lb 9.6 oz)   SpO2 92%  Body mass index is 38.85 kg/m².    Hearing fair.    Dentition poor  Alert, oriented in no acute distress.  Eye contact is good, speech goal directed, affect calm    Assessment and Plan. The following treatment and monitoring plan is recommended:    1. Morbid (severe) obesity due to excess calories (HCC)    2. Acute exacerbation of chronic bronchitis (HCC)    3. Body mass index (BMI) 38.0-38.9, adult    4. Other hyperlipidemia  - rosuvastatin (CRESTOR) 10 MG Tab; Take 1 Tablet by mouth every evening.  Dispense: 90 Tablet; Refill: 2    5. Medicare annual wellness visit, subsequent    HCC Gap Form    Diagnosis: E66.01 - Morbid (severe) obesity due to excess calories (HCC)  Z68.38 - Body mass index (BMI) 38.0-38.9, adult  Comorbidity Diagnosis: Acute exacerbation of chronic bronchitis (HCC)  The current BMI is 38.85 kg/m2 as of 05/30/23 16:38 PDT  Assessment and plan: Chronic, stable. Encouraged healthy diet and physical activity changes with a goal of weight loss. Follow up at least annually. The comorbid condition is stable based on today's assessment. Continue current treatment plan. Follow up in 2 months and re-valuate advair.    Last edited 05/30/23 16:46 PDT by Sagrario Marley P.A.-C.         Services suggested: No services needed at this time  Health Care Screening: Age-appropriate preventive services recommended by USPTF and ACIP covered by Medicare were discussed today. Services ordered if indicated and agreed upon by the patient.  Referrals offered: Community-based lifestyle interventions to reduce health risks and promote self-management and wellness, fall prevention, nutrition, physical activity, tobacco-use cessation, weight loss, and mental " health services as per orders if indicated.    Discussion today about general wellness and lifestyle habits:    Prevent falls and reduce trip hazards; Cautioned about securing or removing rugs.  Have a working fire alarm and carbon monoxide detector--has  Engage in regular physical activity and social activities-gardening.      Follow-up: No follow-ups on file.

## 2023-08-04 ENCOUNTER — PATIENT MESSAGE (OUTPATIENT)
Dept: MEDICAL GROUP | Facility: CLINIC | Age: 78
End: 2023-08-04
Payer: MEDICARE

## 2023-08-04 DIAGNOSIS — D50.9 IRON DEFICIENCY ANEMIA, UNSPECIFIED IRON DEFICIENCY ANEMIA TYPE: ICD-10-CM

## 2023-08-04 DIAGNOSIS — E78.49 OTHER HYPERLIPIDEMIA: ICD-10-CM

## 2023-08-04 DIAGNOSIS — Z13.21 ENCOUNTER FOR VITAMIN DEFICIENCY SCREENING: ICD-10-CM

## 2023-08-04 DIAGNOSIS — E03.9 ACQUIRED HYPOTHYROIDISM: ICD-10-CM

## 2023-08-07 DIAGNOSIS — N39.0 RECURRENT UTI: ICD-10-CM

## 2023-08-07 NOTE — TELEPHONE ENCOUNTER
Was the patient seen in the last year in this department? Yes    Does patient have an active prescription for medications requested? Yes    Received Request Via: Patient    Office Visit on 04/12/2023   Component Date Value    POC Group A Strep, PCR 04/12/2023 Not Detected     SARS-CoV-2 by PCR 04/12/2023 Positive (A)     Influenza virus A RNA 04/12/2023 Negative     Influenza virus B, PCR 04/12/2023 Negative     RSV, PCR 04/12/2023 Negative    Hospital Outpatient Visit on 03/08/2023   Component Date Value    Significant Indicator 03/08/2023 NEG     Source 03/08/2023 UR     Site 03/08/2023 -     Culture Result 03/08/2023 Usual urogenital soo ,000 cfu/mL    Hospital Outpatient Visit on 03/08/2023   Component Date Value    Eject.Frac. MOD BP 03/08/2023 58.38     Eject.Frac. MOD 4C 03/08/2023 63.17     Eject.Frac. MOD 2C 03/08/2023 57.58     Left Ventrical Ejection * 03/08/2023 60    Office Visit on 03/08/2023   Component Date Value    POC Color 03/08/2023 yellow     POC Appearance 03/08/2023 clear     POC Glucose 03/08/2023 negative     POC Bilirubin 03/08/2023 negative     POC Ketones 03/08/2023 negative     POC Specific Gravity 03/08/2023 1.025     POC Blood 03/08/2023 negative     POC Urine PH 03/08/2023 6.0     POC Protein 03/08/2023 negative     POC Urobiligen 03/08/2023 0.2     POC Nitrites 03/08/2023 negative     POC Leukocyte Esterase 03/08/2023 small    Hospital Outpatient Visit on 09/20/2022   Component Date Value    Significant Indicator 09/20/2022 NEG     Source 09/20/2022 UR     Site 09/20/2022 -     Culture Result 09/20/2022 Usual urogenital soo ,000 cfu/mL    Office Visit on 09/20/2022   Component Date Value    POC Color 09/20/2022 Yellow     POC Appearance 09/20/2022 Slightly Cloudy     POC Leukocyte Esterase 09/20/2022 Neg     POC Nitrites 09/20/2022 Neg     POC Urobiligen 09/20/2022 0.2     POC Protein 09/20/2022 Neg     POC Urine PH 09/20/2022 6.5     POC Blood 09/20/2022 Trace-intact      POC Specific Gravity 09/20/2022 1.020     POC Ketones 09/20/2022 Neg     POC Bilirubin 09/20/2022 Neg     POC Glucose 09/20/2022 Neg    ]

## 2023-08-08 RX ORDER — SULFAMETHOXAZOLE AND TRIMETHOPRIM 400; 80 MG/1; MG/1
1 TABLET ORAL DAILY
Qty: 90 TABLET | Refills: 0 | Status: SHIPPED | OUTPATIENT
Start: 2023-08-08 | End: 2023-10-10

## 2023-09-18 ENCOUNTER — APPOINTMENT (OUTPATIENT)
Dept: MEDICAL GROUP | Facility: CLINIC | Age: 78
End: 2023-09-18
Payer: MEDICARE

## 2023-10-09 DIAGNOSIS — N39.0 RECURRENT UTI: ICD-10-CM

## 2023-10-10 RX ORDER — SULFAMETHOXAZOLE AND TRIMETHOPRIM 400; 80 MG/1; MG/1
1 TABLET ORAL DAILY
Qty: 90 TABLET | Refills: 0 | Status: SHIPPED | OUTPATIENT
Start: 2023-10-10 | End: 2024-02-27

## 2023-10-10 NOTE — TELEPHONE ENCOUNTER
Requested Prescriptions     Pending Prescriptions Disp Refills    sulfamethoxazole-trimethoprim (BACTRIM) 400-80 MG Tab [Pharmacy Med Name: SULFAMETHOXAZOLE-TMP -80 TAB] 90 Tablet 0     Sig: TAKE 1 TABLET BY MOUTH DAILY      Last office visit: 5/30/23  Last lab: 9/20/22

## 2023-10-31 DIAGNOSIS — J45.40 MODERATE PERSISTENT ASTHMA, UNSPECIFIED WHETHER COMPLICATED: ICD-10-CM

## 2023-10-31 DIAGNOSIS — R09.02 HYPOXIA: ICD-10-CM

## 2023-10-31 RX ORDER — FLUTICASONE PROPIONATE AND SALMETEROL 500; 50 UG/1; UG/1
1 POWDER RESPIRATORY (INHALATION) EVERY 12 HOURS
Qty: 180 EACH | Refills: 0 | Status: SHIPPED | OUTPATIENT
Start: 2023-10-31 | End: 2023-12-05 | Stop reason: SDUPTHER

## 2023-10-31 NOTE — TELEPHONE ENCOUNTER
Was the patient seen in the last year in this department? Yes    Does patient have an active prescription for medications requested? Yes    Received Request Via: Patient    Office Visit on 04/12/2023   Component Date Value    POC Group A Strep, PCR 04/12/2023 Not Detected     SARS-CoV-2 by PCR 04/12/2023 Positive (A)     Influenza virus A RNA 04/12/2023 Negative     Influenza virus B, PCR 04/12/2023 Negative     RSV, PCR 04/12/2023 Negative    Hospital Outpatient Visit on 03/08/2023   Component Date Value    Significant Indicator 03/08/2023 NEG     Source 03/08/2023 UR     Site 03/08/2023 -     Culture Result 03/08/2023 Usual urogenital soo ,000 cfu/mL    Hospital Outpatient Visit on 03/08/2023   Component Date Value    Eject.Frac. MOD BP 03/08/2023 58.38     Eject.Frac. MOD 4C 03/08/2023 63.17     Eject.Frac. MOD 2C 03/08/2023 57.58     Left Ventrical Ejection * 03/08/2023 60    Office Visit on 03/08/2023   Component Date Value    POC Color 03/08/2023 yellow     POC Appearance 03/08/2023 clear     POC Glucose 03/08/2023 negative     POC Bilirubin 03/08/2023 negative     POC Ketones 03/08/2023 negative     POC Specific Gravity 03/08/2023 1.025     POC Blood 03/08/2023 negative     POC Urine PH 03/08/2023 6.0     POC Protein 03/08/2023 negative     POC Urobiligen 03/08/2023 0.2     POC Nitrites 03/08/2023 negative     POC Leukocyte Esterase 03/08/2023 small    ]

## 2023-12-01 DIAGNOSIS — E03.9 ACQUIRED HYPOTHYROIDISM: ICD-10-CM

## 2023-12-01 RX ORDER — LEVOTHYROXINE SODIUM 0.03 MG/1
25 TABLET ORAL
Qty: 90 TABLET | Refills: 0 | Status: SHIPPED | OUTPATIENT
Start: 2023-12-01

## 2023-12-01 NOTE — TELEPHONE ENCOUNTER
Requested Prescriptions     Pending Prescriptions Disp Refills    levothyroxine (SYNTHROID) 25 MCG Tab [Pharmacy Med Name: LEVOTHYROXINE 25 MCG TABLET] 90 Tablet 1     Sig: TAKE ONE TABLET BY MOUTH EVERY MORNING ON AN EMPTY STOMACH      Last office visit: 5/30/23  Last lab: 9/20/22

## 2023-12-05 ENCOUNTER — OFFICE VISIT (OUTPATIENT)
Dept: MEDICAL GROUP | Facility: CLINIC | Age: 78
End: 2023-12-05
Payer: MEDICARE

## 2023-12-05 ENCOUNTER — HOSPITAL ENCOUNTER (OUTPATIENT)
Dept: LAB | Facility: MEDICAL CENTER | Age: 78
End: 2023-12-05
Attending: PHYSICIAN ASSISTANT
Payer: MEDICARE

## 2023-12-05 VITALS
RESPIRATION RATE: 20 BRPM | HEIGHT: 61 IN | TEMPERATURE: 98.2 F | BODY MASS INDEX: 37.42 KG/M2 | SYSTOLIC BLOOD PRESSURE: 118 MMHG | OXYGEN SATURATION: 94 % | WEIGHT: 198.2 LBS | DIASTOLIC BLOOD PRESSURE: 64 MMHG | HEART RATE: 71 BPM

## 2023-12-05 DIAGNOSIS — Z12.83 SKIN CANCER SCREENING: ICD-10-CM

## 2023-12-05 DIAGNOSIS — J45.40 MODERATE PERSISTENT ASTHMA, UNSPECIFIED WHETHER COMPLICATED: ICD-10-CM

## 2023-12-05 DIAGNOSIS — Z13.21 ENCOUNTER FOR VITAMIN DEFICIENCY SCREENING: ICD-10-CM

## 2023-12-05 DIAGNOSIS — J30.2 SEASONAL ALLERGIES: ICD-10-CM

## 2023-12-05 DIAGNOSIS — D50.9 IRON DEFICIENCY ANEMIA, UNSPECIFIED IRON DEFICIENCY ANEMIA TYPE: ICD-10-CM

## 2023-12-05 DIAGNOSIS — E03.9 ACQUIRED HYPOTHYROIDISM: ICD-10-CM

## 2023-12-05 DIAGNOSIS — R23.2 HOT FLASHES: ICD-10-CM

## 2023-12-05 DIAGNOSIS — R09.02 HYPOXIA: ICD-10-CM

## 2023-12-05 DIAGNOSIS — E78.49 OTHER HYPERLIPIDEMIA: ICD-10-CM

## 2023-12-05 PROBLEM — M79.10 MUSCLE PAIN: Status: ACTIVE | Noted: 2023-04-02

## 2023-12-05 PROBLEM — M65.4 RADIAL STYLOID TENOSYNOVITIS OF RIGHT HAND: Status: ACTIVE | Noted: 2023-03-28

## 2023-12-05 LAB
ALBUMIN SERPL BCP-MCNC: 4.4 G/DL (ref 3.2–4.9)
ALBUMIN/GLOB SERPL: 1.5 G/DL
ALP SERPL-CCNC: 109 U/L (ref 30–99)
ALT SERPL-CCNC: 21 U/L (ref 2–50)
ANION GAP SERPL CALC-SCNC: 9 MMOL/L (ref 7–16)
AST SERPL-CCNC: 18 U/L (ref 12–45)
BASOPHILS # BLD AUTO: 0.4 % (ref 0–1.8)
BASOPHILS # BLD: 0.03 K/UL (ref 0–0.12)
BILIRUB SERPL-MCNC: 0.6 MG/DL (ref 0.1–1.5)
BUN SERPL-MCNC: 23 MG/DL (ref 8–22)
CALCIUM ALBUM COR SERPL-MCNC: 9.1 MG/DL (ref 8.5–10.5)
CALCIUM SERPL-MCNC: 9.4 MG/DL (ref 8.5–10.5)
CHLORIDE SERPL-SCNC: 107 MMOL/L (ref 96–112)
CHOLEST SERPL-MCNC: 170 MG/DL (ref 100–199)
CO2 SERPL-SCNC: 26 MMOL/L (ref 20–33)
CREAT SERPL-MCNC: 0.59 MG/DL (ref 0.5–1.4)
EOSINOPHIL # BLD AUTO: 0.28 K/UL (ref 0–0.51)
EOSINOPHIL NFR BLD: 3.5 % (ref 0–6.9)
ERYTHROCYTE [DISTWIDTH] IN BLOOD BY AUTOMATED COUNT: 47.9 FL (ref 35.9–50)
FASTING STATUS PATIENT QL REPORTED: NORMAL
GFR SERPLBLD CREATININE-BSD FMLA CKD-EPI: 92 ML/MIN/1.73 M 2
GLOBULIN SER CALC-MCNC: 3 G/DL (ref 1.9–3.5)
GLUCOSE SERPL-MCNC: 102 MG/DL (ref 65–99)
HCT VFR BLD AUTO: 43.6 % (ref 37–47)
HDLC SERPL-MCNC: 60 MG/DL
HGB BLD-MCNC: 14.1 G/DL (ref 12–16)
IMM GRANULOCYTES # BLD AUTO: 0.03 K/UL (ref 0–0.11)
IMM GRANULOCYTES NFR BLD AUTO: 0.4 % (ref 0–0.9)
IRON SATN MFR SERPL: 31 % (ref 15–55)
IRON SERPL-MCNC: 86 UG/DL (ref 40–170)
LDLC SERPL CALC-MCNC: 87 MG/DL
LYMPHOCYTES # BLD AUTO: 1.79 K/UL (ref 1–4.8)
LYMPHOCYTES NFR BLD: 22.1 % (ref 22–41)
MCH RBC QN AUTO: 29.4 PG (ref 27–33)
MCHC RBC AUTO-ENTMCNC: 32.3 G/DL (ref 32.2–35.5)
MCV RBC AUTO: 91 FL (ref 81.4–97.8)
MONOCYTES # BLD AUTO: 0.69 K/UL (ref 0–0.85)
MONOCYTES NFR BLD AUTO: 8.5 % (ref 0–13.4)
NEUTROPHILS # BLD AUTO: 5.29 K/UL (ref 1.82–7.42)
NEUTROPHILS NFR BLD: 65.1 % (ref 44–72)
NRBC # BLD AUTO: 0 K/UL
NRBC BLD-RTO: 0 /100 WBC (ref 0–0.2)
PLATELET # BLD AUTO: 243 K/UL (ref 164–446)
PMV BLD AUTO: 11.4 FL (ref 9–12.9)
POTASSIUM SERPL-SCNC: 4.4 MMOL/L (ref 3.6–5.5)
PROT SERPL-MCNC: 7.4 G/DL (ref 6–8.2)
RBC # BLD AUTO: 4.79 M/UL (ref 4.2–5.4)
SODIUM SERPL-SCNC: 142 MMOL/L (ref 135–145)
TIBC SERPL-MCNC: 275 UG/DL (ref 250–450)
TRIGL SERPL-MCNC: 117 MG/DL (ref 0–149)
UIBC SERPL-MCNC: 189 UG/DL (ref 110–370)
WBC # BLD AUTO: 8.1 K/UL (ref 4.8–10.8)

## 2023-12-05 PROCEDURE — 36415 COLL VENOUS BLD VENIPUNCTURE: CPT

## 2023-12-05 PROCEDURE — 83550 IRON BINDING TEST: CPT

## 2023-12-05 PROCEDURE — 84443 ASSAY THYROID STIM HORMONE: CPT

## 2023-12-05 PROCEDURE — 99214 OFFICE O/P EST MOD 30 MIN: CPT | Performed by: PHYSICIAN ASSISTANT

## 2023-12-05 PROCEDURE — 82746 ASSAY OF FOLIC ACID SERUM: CPT

## 2023-12-05 PROCEDURE — 3078F DIAST BP <80 MM HG: CPT | Performed by: PHYSICIAN ASSISTANT

## 2023-12-05 PROCEDURE — 83540 ASSAY OF IRON: CPT

## 2023-12-05 PROCEDURE — 85025 COMPLETE CBC W/AUTO DIFF WBC: CPT

## 2023-12-05 PROCEDURE — 82728 ASSAY OF FERRITIN: CPT

## 2023-12-05 PROCEDURE — 80061 LIPID PANEL: CPT

## 2023-12-05 PROCEDURE — 82607 VITAMIN B-12: CPT

## 2023-12-05 PROCEDURE — 80053 COMPREHEN METABOLIC PANEL: CPT

## 2023-12-05 PROCEDURE — 3074F SYST BP LT 130 MM HG: CPT | Performed by: PHYSICIAN ASSISTANT

## 2023-12-05 PROCEDURE — 82306 VITAMIN D 25 HYDROXY: CPT | Mod: GA

## 2023-12-05 RX ORDER — FLUTICASONE PROPIONATE AND SALMETEROL 500; 50 UG/1; UG/1
1 POWDER RESPIRATORY (INHALATION) EVERY 12 HOURS
Qty: 180 EACH | Refills: 2 | Status: SHIPPED | OUTPATIENT
Start: 2023-12-05

## 2023-12-05 NOTE — PROGRESS NOTES
cc:  asthma    Subjective:     Soo Huff is a 78 y.o. female presenting for asthma      Patient presents to the office for asthma/copd.  She states that she did not see a change with the increased dose.  She does indicate that she needs a refill.      Patient is having hot flashes daily 2-3 times a day.  She states that they are random.  She states that when she took two medications together she had a hot flash almost immediately.  Patient stopped the gabapentin for dizziness and blurred vision.  She states that she is taking prevagen to help with memory.      Patient states that she is having dark spots with her skin that she is concerned about and would like to evaluate further.     Review of systems:  See above.   Denies any symptoms unless previously indicated.        Current Outpatient Medications:     fluticasone-salmeterol (ADVAIR) 500-50 MCG/ACT AEROSOL POWDER, BREATH ACTIVATED, Inhale 1 Puff every 12 hours., Disp: 180 Each, Rfl: 2    levothyroxine (SYNTHROID) 25 MCG Tab, TAKE ONE TABLET BY MOUTH EVERY MORNING ON AN EMPTY STOMACH, Disp: 90 Tablet, Rfl: 0    sulfamethoxazole-trimethoprim (BACTRIM) 400-80 MG Tab, TAKE 1 TABLET BY MOUTH DAILY, Disp: 90 Tablet, Rfl: 0    montelukast (SINGULAIR) 10 MG Tab, TAKE ONE TABLET BY MOUTH DAILY, Disp: 90 Tablet, Rfl: 1    celecoxib (CELEBREX) 200 MG Cap, TAKE ONE CAPSULE BY MOUTH DAILY, Disp: 30 Capsule, Rfl: 2    gabapentin (NEURONTIN) 300 MG Cap, TAKE ONE CAPSULE BY MOUTH THREE TIMES A DAY, Disp: 270 Capsule, Rfl: 1    rosuvastatin (CRESTOR) 10 MG Tab, Take 1 Tablet by mouth every evening., Disp: 90 Tablet, Rfl: 2    ipratropium-albuterol (DUONEB) 0.5-2.5 (3) MG/3ML nebulizer solution, Take 3 mL by nebulization 4 times a day., Disp: 300 mL, Rfl: 2    loratadine (CLARITIN) 10 MG Tab, Take 1 Tablet by mouth every day., Disp: 90 Tablet, Rfl: 1    fluticasone (FLONASE) 50 MCG/ACT nasal spray, SPRAY ONE SPRAY IN THE AFFECTED NOSTRIL(S) DAILY, Disp: 16 mL, Rfl: 3     "sertraline (ZOLOFT) 100 MG Tab, TAKE ONE TABLET BY MOUTH DAILY (Patient not taking: Reported on 12/5/2023), Disp: 90 Tablet, Rfl: 0    Allergies, past medical history, past surgical history, family history, social history reviewed and updated    Objective:     Vitals: /64 (BP Location: Left arm, Patient Position: Sitting, BP Cuff Size: Adult)   Pulse 71   Temp 36.8 °C (98.2 °F) (Temporal)   Resp 20   Ht 1.549 m (5' 1\")   Wt 89.9 kg (198 lb 3.2 oz)   SpO2 94%   BMI 37.45 kg/m²   General: Alert, pleasant, NAD  EYES:   PERRL, EOMI, no icterus or pallor.  Conjunctivae and lids normal.   HENT:  Normocephalic.  External ears normal.  Neck supple.     Heart: Regular rate and rhythm.  S1 and S2 normal.  No murmurs appreciated.  Respiratory: Normal respiratory effort.  Clear to auscultation bilaterally.  Lung sounds are improved from previous visit.  Abdomen: obese  Skin: Warm, dry, no rashes.  Dark and skin spots seen on upper extremities  Musculoskeletal: Gait is normal.  Moves all extremities well.    Extremities: Normal range of motion all extremities.   Neurological: No tremors, sensation gross intact, patellar CN2-12 intact.  Psych:  Affect/mood is normal, judgement is good, memory is intact, grooming is appropriate.    Assessment/Plan:     Soo was seen today for medication refill and follow-up.    Diagnoses and all orders for this visit:    Moderate persistent asthma, unspecified whether complicated  -     fluticasone-salmeterol (ADVAIR) 500-50 MCG/ACT AEROSOL POWDER, BREATH ACTIVATED; Inhale 1 Puff every 12 hours.  Hypoxia  -     fluticasone-salmeterol (ADVAIR) 500-50 MCG/ACT AEROSOL POWDER, BREATH ACTIVATED; Inhale 1 Puff every 12 hours.  Seasonal allergies    Doing well with medications.  Patient still has some throat clearing with allergy symptoms.  She is currently taking an antihistamine as well as Singulair.  We will continue to monitor.    Hot flashes  Acquired hypothyroid    Believe this is most " likely due to medication interaction or possibly as patient stopped her Synthroid for her hypothyroidism.  Patient to bring in all of her medications for review in 4 weeks.      Skin cancer screening    We will submit a referral to dermatology for further evaluation.        Return in about 4 weeks (around 1/2/2024), or if symptoms worsen or fail to improve.    Please note that this dictation was created using voice recognition software. I have made every reasonable attempt to correct obvious errors, but expect that there are errors of grammar and possible content that I did not discover before finalizing note.

## 2023-12-06 LAB
25(OH)D3 SERPL-MCNC: 33 NG/ML (ref 30–100)
FERRITIN SERPL-MCNC: 96.7 NG/ML (ref 10–291)
FOLATE SERPL-MCNC: 15.1 NG/ML
TSH SERPL DL<=0.005 MIU/L-ACNC: 4.23 UIU/ML (ref 0.38–5.33)
VIT B12 SERPL-MCNC: 204 PG/ML (ref 211–911)

## 2024-01-15 RX ORDER — CELECOXIB 200 MG/1
200 CAPSULE ORAL DAILY
Qty: 30 CAPSULE | Refills: 0 | Status: SHIPPED | OUTPATIENT
Start: 2024-01-15 | End: 2024-02-12

## 2024-01-15 NOTE — TELEPHONE ENCOUNTER
Was the patient seen in the last year in this department? Yes    Does patient have an active prescription for medications requested? Yes    Received Request Via: Pharmacy    Hospital Outpatient Visit on 12/05/2023   Component Date Value    WBC 12/05/2023 8.1     RBC 12/05/2023 4.79     Hemoglobin 12/05/2023 14.1     Hematocrit 12/05/2023 43.6     MCV 12/05/2023 91.0     MCH 12/05/2023 29.4     MCHC 12/05/2023 32.3     RDW 12/05/2023 47.9     Platelet Count 12/05/2023 243     MPV 12/05/2023 11.4     Neutrophils-Polys 12/05/2023 65.10     Lymphocytes 12/05/2023 22.10     Monocytes 12/05/2023 8.50     Eosinophils 12/05/2023 3.50     Basophils 12/05/2023 0.40     Immature Granulocytes 12/05/2023 0.40     Nucleated RBC 12/05/2023 0.00     Neutrophils (Absolute) 12/05/2023 5.29     Lymphs (Absolute) 12/05/2023 1.79     Monos (Absolute) 12/05/2023 0.69     Eos (Absolute) 12/05/2023 0.28     Baso (Absolute) 12/05/2023 0.03     Immature Granulocytes (a* 12/05/2023 0.03     NRBC (Absolute) 12/05/2023 0.00     Iron 12/05/2023 86     Total Iron Binding 12/05/2023 275     Unsat Iron Binding 12/05/2023 189     % Saturation 12/05/2023 31     Vitamin B12 -True Cobala* 12/05/2023 204 (L)     Folate -Folic Acid 12/05/2023 15.1     Ferritin 12/05/2023 96.7     Cholesterol,Tot 12/05/2023 170     Triglycerides 12/05/2023 117     HDL 12/05/2023 60     LDL 12/05/2023 87     Sodium 12/05/2023 142     Potassium 12/05/2023 4.4     Chloride 12/05/2023 107     Co2 12/05/2023 26     Anion Gap 12/05/2023 9.0     Glucose 12/05/2023 102 (H)     Bun 12/05/2023 23 (H)     Creatinine 12/05/2023 0.59     Calcium 12/05/2023 9.4     Correct Calcium 12/05/2023 9.1     AST(SGOT) 12/05/2023 18     ALT(SGPT) 12/05/2023 21     Alkaline Phosphatase 12/05/2023 109 (H)     Total Bilirubin 12/05/2023 0.6     Albumin 12/05/2023 4.4     Total Protein 12/05/2023 7.4     Globulin 12/05/2023 3.0     A-G Ratio 12/05/2023 1.5     25-Hydroxy   Vitamin D 25 12/05/2023 33      TSH 12/05/2023 4.230     Fasting Status 12/05/2023 Fasting     GFR (CKD-EPI) 12/05/2023 92    Office Visit on 04/12/2023   Component Date Value    POC Group A Strep, PCR 04/12/2023 Not Detected     SARS-CoV-2 by PCR 04/12/2023 Positive (A)     Influenza virus A RNA 04/12/2023 Negative     Influenza virus B, PCR 04/12/2023 Negative     RSV, PCR 04/12/2023 Negative    Hospital Outpatient Visit on 03/08/2023   Component Date Value    Significant Indicator 03/08/2023 NEG     Source 03/08/2023 UR     Site 03/08/2023 -     Culture Result 03/08/2023 Usual urogenital soo ,000 cfu/mL    Hospital Outpatient Visit on 03/08/2023   Component Date Value    Eject.Frac. MOD BP 03/08/2023 58.38     Eject.Frac. MOD 4C 03/08/2023 63.17     Eject.Frac. MOD 2C 03/08/2023 57.58     Left Ventrical Ejection * 03/08/2023 60    Office Visit on 03/08/2023   Component Date Value    POC Color 03/08/2023 yellow     POC Appearance 03/08/2023 clear     POC Glucose 03/08/2023 negative     POC Bilirubin 03/08/2023 negative     POC Ketones 03/08/2023 negative     POC Specific Gravity 03/08/2023 1.025     POC Blood 03/08/2023 negative     POC Urine PH 03/08/2023 6.0     POC Protein 03/08/2023 negative     POC Urobiligen 03/08/2023 0.2     POC Nitrites 03/08/2023 negative     POC Leukocyte Esterase 03/08/2023 small    ]

## 2024-01-18 ENCOUNTER — APPOINTMENT (OUTPATIENT)
Dept: MEDICAL GROUP | Facility: CLINIC | Age: 79
End: 2024-01-18
Payer: MEDICARE

## 2024-02-12 RX ORDER — CELECOXIB 200 MG/1
200 CAPSULE ORAL DAILY
Qty: 30 CAPSULE | Refills: 2 | Status: SHIPPED | OUTPATIENT
Start: 2024-02-12

## 2024-02-15 ENCOUNTER — APPOINTMENT (RX ONLY)
Dept: URBAN - METROPOLITAN AREA CLINIC 31 | Facility: CLINIC | Age: 79
Setting detail: DERMATOLOGY
End: 2024-02-15

## 2024-02-15 DIAGNOSIS — B36.0 PITYRIASIS VERSICOLOR: ICD-10-CM

## 2024-02-15 DIAGNOSIS — L82.1 OTHER SEBORRHEIC KERATOSIS: ICD-10-CM

## 2024-02-15 DIAGNOSIS — L81.4 OTHER MELANIN HYPERPIGMENTATION: ICD-10-CM

## 2024-02-15 DIAGNOSIS — D18.0 HEMANGIOMA: ICD-10-CM

## 2024-02-15 DIAGNOSIS — L82.0 INFLAMED SEBORRHEIC KERATOSIS: ICD-10-CM

## 2024-02-15 PROBLEM — D18.01 HEMANGIOMA OF SKIN AND SUBCUTANEOUS TISSUE: Status: ACTIVE | Noted: 2024-02-15

## 2024-02-15 PROCEDURE — ? PRESCRIPTION

## 2024-02-15 PROCEDURE — 17110 DESTRUCTION B9 LES UP TO 14: CPT

## 2024-02-15 PROCEDURE — ? POINTED OUT BY PATIENT

## 2024-02-15 PROCEDURE — 99203 OFFICE O/P NEW LOW 30 MIN: CPT | Mod: 25

## 2024-02-15 PROCEDURE — ? LIQUID NITROGEN

## 2024-02-15 PROCEDURE — ? COUNSELING

## 2024-02-15 RX ORDER — KETOCONAZOLE 20 MG/ML
SHAMPOO, SUSPENSION TOPICAL TIW
Qty: 120 | Refills: 3 | Status: ERX | COMMUNITY
Start: 2024-02-15

## 2024-02-15 RX ORDER — ECONAZOLE NITRATE 10 MG/G
CREAM TOPICAL
Qty: 85 | Refills: 3 | Status: ERX | COMMUNITY
Start: 2024-02-15

## 2024-02-15 RX ADMIN — KETOCONAZOLE: 20 SHAMPOO, SUSPENSION TOPICAL at 00:00

## 2024-02-15 RX ADMIN — ECONAZOLE NITRATE: 10 CREAM TOPICAL at 00:00

## 2024-02-15 ASSESSMENT — LOCATION ZONE DERM
LOCATION ZONE: AXILLAE
LOCATION ZONE: TRUNK
LOCATION ZONE: NECK
LOCATION ZONE: FACE

## 2024-02-15 ASSESSMENT — LOCATION DETAILED DESCRIPTION DERM
LOCATION DETAILED: LEFT SUPERIOR MEDIAL MIDBACK
LOCATION DETAILED: RIGHT CENTRAL MALAR CHEEK
LOCATION DETAILED: LEFT MEDIAL BREAST 7-8:00 REGION
LOCATION DETAILED: LEFT CENTRAL MALAR CHEEK
LOCATION DETAILED: LEFT MEDIAL BREAST 11-12:00 REGION
LOCATION DETAILED: RIGHT AXILLARY VAULT
LOCATION DETAILED: RIGHT CLAVICULAR NECK
LOCATION DETAILED: RIGHT MID-UPPER BACK
LOCATION DETAILED: LEFT AXILLARY VAULT

## 2024-02-15 ASSESSMENT — LOCATION SIMPLE DESCRIPTION DERM
LOCATION SIMPLE: RIGHT UPPER BACK
LOCATION SIMPLE: RIGHT AXILLARY VAULT
LOCATION SIMPLE: LEFT BREAST
LOCATION SIMPLE: RIGHT CHEEK
LOCATION SIMPLE: LEFT AXILLARY VAULT
LOCATION SIMPLE: LEFT CHEEK
LOCATION SIMPLE: LEFT LOWER BACK
LOCATION SIMPLE: RIGHT ANTERIOR NECK

## 2024-02-15 NOTE — PROCEDURE: LIQUID NITROGEN
Show Aperture Variable?: Yes
Include Z78.9 (Other Specified Conditions Influencing Health Status) As An Associated Diagnosis?: No
Number Of Freeze-Thaw Cycles: 2 freeze-thaw cycles
Medical Necessity Clause: This procedure was medically necessary because the lesions that were treated were:
Duration Of Freeze Thaw-Cycle (Seconds): 3
Detail Level: Detailed
Post-Care Instructions: I reviewed with the patient in detail post-care instructions. Patient is to wear sunprotection, and avoid picking at any of the treated lesions. Pt may apply Vaseline to crusted or scabbing areas.
Spray Paint Text: The liquid nitrogen was applied to the skin utilizing a spray paint frosting technique.
Consent: The patient's consent was obtained including but not limited to risks of crusting, scabbing, blistering, scarring, darker or lighter pigmentary change, recurrence, incomplete removal and infection.
Application Tool (Optional): Cry-AC
Medical Necessity Information: It is in your best interest to select a reason for this procedure from the list below. All of these items fulfill various CMS LCD requirements except the new and changing color options.

## 2024-02-15 NOTE — PROCEDURE: POINTED OUT BY PATIENT
Detail Level: Simple
Additional Text: Lesion bothersome and irritated for patient. Patient reports lesion growth.

## 2024-02-23 DIAGNOSIS — N39.0 RECURRENT UTI: ICD-10-CM

## 2024-02-23 DIAGNOSIS — J30.2 SEASONAL ALLERGIES: ICD-10-CM

## 2024-02-26 NOTE — TELEPHONE ENCOUNTER
Received request via: Patient    Was the patient seen in the last year in this department? Yes    Does the patient have an active prescription (recently filled or refills available) for medication(s) requested? No    Does the patient have assisted Plus and need 100 day supply (blood pressure, diabetes and cholesterol meds only)? Patient does not have SCP      Last Ov 12/5/23  Last Labs 12/5/23

## 2024-02-27 RX ORDER — SULFAMETHOXAZOLE AND TRIMETHOPRIM 400; 80 MG/1; MG/1
1 TABLET ORAL DAILY
Qty: 90 TABLET | Refills: 0 | Status: SHIPPED | OUTPATIENT
Start: 2024-02-27

## 2024-02-27 RX ORDER — LORATADINE 10 MG/1
10 TABLET ORAL DAILY
Qty: 90 TABLET | Refills: 0 | Status: SHIPPED | OUTPATIENT
Start: 2024-02-27

## 2024-03-18 DIAGNOSIS — J20.9 ACUTE EXACERBATION OF CHRONIC BRONCHITIS (HCC): ICD-10-CM

## 2024-03-18 DIAGNOSIS — J42 ACUTE EXACERBATION OF CHRONIC BRONCHITIS (HCC): ICD-10-CM

## 2024-03-18 RX ORDER — IPRATROPIUM BROMIDE AND ALBUTEROL SULFATE 2.5; .5 MG/3ML; MG/3ML
SOLUTION RESPIRATORY (INHALATION)
Qty: 360 EACH | Refills: 1 | Status: SHIPPED | OUTPATIENT
Start: 2024-03-18

## 2024-03-18 NOTE — TELEPHONE ENCOUNTER
Requested Prescriptions     Pending Prescriptions Disp Refills    ipratropium-albuterol (DUONEB) 0.5-2.5 (3) MG/3ML nebulizer solution [Pharmacy Med Name: ipratropium 0.5 mg-albuterol 3 mg (2.5 mg base)/3 mL nebulization soln] 120 Each 11     Sig: USE 1 VIAL IN NEBULIZER 4 TIMES DAILY      Last office visit: 12/5/23  Last lab: 12/5/23

## 2024-04-11 DIAGNOSIS — R09.82 POST-NASAL DRIP: ICD-10-CM

## 2024-04-11 NOTE — TELEPHONE ENCOUNTER
Requested Prescriptions     Pending Prescriptions Disp Refills    montelukast (SINGULAIR) 10 MG Tab [Pharmacy Med Name: MONTELUKAST SOD 10 MG TABLET] 90 Tablet 1     Sig: TAKE 1 TABLET BY MOUTH DAILY     LOV 12/05/23  Labs 12/05/23

## 2024-04-12 RX ORDER — MONTELUKAST SODIUM 10 MG/1
10 TABLET ORAL DAILY
Qty: 90 TABLET | Refills: 1 | Status: SHIPPED | OUTPATIENT
Start: 2024-04-12

## 2024-05-11 DIAGNOSIS — E78.49 OTHER HYPERLIPIDEMIA: ICD-10-CM

## 2024-05-13 RX ORDER — ROSUVASTATIN CALCIUM 10 MG/1
10 TABLET, COATED ORAL EVERY EVENING
Qty: 90 TABLET | Refills: 0 | Status: SHIPPED | OUTPATIENT
Start: 2024-05-13

## 2024-05-13 NOTE — TELEPHONE ENCOUNTER
Requested Prescriptions     Pending Prescriptions Disp Refills    rosuvastatin (CRESTOR) 10 MG Tab [Pharmacy Med Name: ROSUVASTATIN CALCIUM 10 MG TAB] 90 Tablet 2     Sig: TAKE ONE TABLET BY MOUTH EVERY EVENING     LOV 12/05/2023  Labs 12/05/2023

## 2024-05-20 NOTE — TELEPHONE ENCOUNTER
Requested Prescriptions     Pending Prescriptions Disp Refills    celecoxib (CELEBREX) 200 MG Cap [Pharmacy Med Name: CELECOXIB 200 MG CAPSULE] 30 Capsule 0     Sig: TAKE 1 CAPSULE BY MOUTH DAILY     LOV 12/05/23  Labs 12/05/23

## 2024-05-21 RX ORDER — CELECOXIB 200 MG/1
200 CAPSULE ORAL DAILY
Qty: 30 CAPSULE | Refills: 0 | Status: SHIPPED | OUTPATIENT
Start: 2024-05-21

## 2024-05-30 DIAGNOSIS — J30.2 SEASONAL ALLERGIES: ICD-10-CM

## 2024-05-30 DIAGNOSIS — N39.0 RECURRENT UTI: ICD-10-CM

## 2024-05-30 RX ORDER — SULFAMETHOXAZOLE AND TRIMETHOPRIM 400; 80 MG/1; MG/1
1 TABLET ORAL DAILY
Qty: 90 TABLET | Refills: 0 | Status: SHIPPED | OUTPATIENT
Start: 2024-05-30

## 2024-05-30 RX ORDER — LORATADINE 10 MG/1
10 TABLET ORAL DAILY
Qty: 90 TABLET | Refills: 0 | Status: SHIPPED | OUTPATIENT
Start: 2024-05-30

## 2024-05-30 NOTE — TELEPHONE ENCOUNTER
Received request via: Patient    Was the patient seen in the last year in this department? Yes    Does the patient have an active prescription (recently filled or refills available) for medication(s) requested?  Yes    Pharmacy Name: [facility] in [place]    Does the patient have [facility] Plus and need 100 day supply (blood pressure, diabetes and cholesterol meds only)? Patient does not have SCP

## 2024-05-30 NOTE — TELEPHONE ENCOUNTER
Received request via: Patient    Was the patient seen in the last year in this department? Yes    Does the patient have an active prescription (recently filled or refills available) for medication(s) requested? No    Pharmacy Name: [facility]     Does the patient have [facility] Plus and need 100 day supply (blood pressure, diabetes and cholesterol meds only)? Patient does not have SCP

## 2024-06-10 ENCOUNTER — APPOINTMENT (OUTPATIENT)
Dept: MEDICAL GROUP | Facility: CLINIC | Age: 79
End: 2024-06-10
Payer: MEDICARE

## 2024-06-11 ENCOUNTER — APPOINTMENT (OUTPATIENT)
Dept: RADIOLOGY | Facility: IMAGING CENTER | Age: 79
End: 2024-06-11
Attending: PHYSICIAN ASSISTANT
Payer: MEDICARE

## 2024-06-11 ENCOUNTER — NON-PROVIDER VISIT (OUTPATIENT)
Dept: URGENT CARE | Facility: CLINIC | Age: 79
End: 2024-06-11
Payer: MEDICARE

## 2024-06-11 ENCOUNTER — OFFICE VISIT (OUTPATIENT)
Dept: MEDICAL GROUP | Facility: CLINIC | Age: 79
End: 2024-06-11
Payer: MEDICARE

## 2024-06-11 VITALS
TEMPERATURE: 97.6 F | SYSTOLIC BLOOD PRESSURE: 118 MMHG | OXYGEN SATURATION: 94 % | DIASTOLIC BLOOD PRESSURE: 76 MMHG | HEIGHT: 62 IN | HEART RATE: 66 BPM | RESPIRATION RATE: 18 BRPM | WEIGHT: 202.38 LBS | BODY MASS INDEX: 37.24 KG/M2

## 2024-06-11 DIAGNOSIS — D50.9 IRON DEFICIENCY ANEMIA, UNSPECIFIED IRON DEFICIENCY ANEMIA TYPE: ICD-10-CM

## 2024-06-11 DIAGNOSIS — E78.49 OTHER HYPERLIPIDEMIA: ICD-10-CM
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
## 2024-06-11 DIAGNOSIS — S19.9XXA INJURY OF NECK, INITIAL ENCOUNTER: ICD-10-CM

## 2024-06-11 DIAGNOSIS — F32.0 CURRENT MILD EPISODE OF MAJOR DEPRESSIVE DISORDER WITHOUT PRIOR EPISODE (HCC): ICD-10-CM

## 2024-06-11 DIAGNOSIS — E03.9 ACQUIRED HYPOTHYROIDISM: ICD-10-CM

## 2024-06-11 DIAGNOSIS — N39.0 RECURRENT UTI: ICD-10-CM

## 2024-06-11 DIAGNOSIS — R73.9 HYPERGLYCEMIA: ICD-10-CM

## 2024-06-11 DIAGNOSIS — E55.9 VITAMIN D DEFICIENCY: ICD-10-CM

## 2024-06-11 DIAGNOSIS — M21.612 BUNION OF GREAT TOE OF LEFT FOOT: ICD-10-CM

## 2024-06-11 PROCEDURE — 3074F SYST BP LT 130 MM HG: CPT | Performed by: PHYSICIAN ASSISTANT

## 2024-06-11 PROCEDURE — 72040 X-RAY EXAM NECK SPINE 2-3 VW: CPT | Mod: TC

## 2024-06-11 PROCEDURE — 3078F DIAST BP <80 MM HG: CPT | Performed by: PHYSICIAN ASSISTANT

## 2024-06-11 PROCEDURE — 99214 OFFICE O/P EST MOD 30 MIN: CPT | Performed by: PHYSICIAN ASSISTANT

## 2024-06-11 ASSESSMENT — FIBROSIS 4 INDEX: FIB4 SCORE: 1.26

## 2024-06-11 ASSESSMENT — PATIENT HEALTH QUESTIONNAIRE - PHQ9: CLINICAL INTERPRETATION OF PHQ2 SCORE: 0
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
## 2024-06-12 ENCOUNTER — HOSPITAL ENCOUNTER (OUTPATIENT)
Dept: LAB | Facility: MEDICAL CENTER | Age: 79
End: 2024-06-12
Attending: PHYSICIAN ASSISTANT
Payer: MEDICARE

## 2024-06-12 DIAGNOSIS — E55.9 VITAMIN D DEFICIENCY: ICD-10-CM

## 2024-06-12 DIAGNOSIS — R73.9 HYPERGLYCEMIA: ICD-10-CM

## 2024-06-12 DIAGNOSIS — E03.9 ACQUIRED HYPOTHYROIDISM: ICD-10-CM

## 2024-06-12 DIAGNOSIS — D50.9 IRON DEFICIENCY ANEMIA, UNSPECIFIED IRON DEFICIENCY ANEMIA TYPE: ICD-10-CM

## 2024-06-12 DIAGNOSIS — E78.49 OTHER HYPERLIPIDEMIA: ICD-10-CM

## 2024-06-12 LAB
25(OH)D3 SERPL-MCNC: 34 NG/ML (ref 30–100)
ALBUMIN SERPL BCP-MCNC: 4.2 G/DL (ref 3.2–4.9)
ALBUMIN/GLOB SERPL: 1.4 G/DL
ALP SERPL-CCNC: 113 U/L (ref 30–99)
ALT SERPL-CCNC: 16 U/L (ref 2–50)
ANION GAP SERPL CALC-SCNC: 11 MMOL/L (ref 7–16)
AST SERPL-CCNC: 21 U/L (ref 12–45)
BASOPHILS # BLD AUTO: 0.9 % (ref 0–1.8)
BASOPHILS # BLD: 0.05 K/UL (ref 0–0.12)
BILIRUB SERPL-MCNC: 0.6 MG/DL (ref 0.1–1.5)
BUN SERPL-MCNC: 15 MG/DL (ref 8–22)
CALCIUM ALBUM COR SERPL-MCNC: 9.2 MG/DL (ref 8.5–10.5)
CALCIUM SERPL-MCNC: 9.4 MG/DL (ref 8.5–10.5)
CHLORIDE SERPL-SCNC: 108 MMOL/L (ref 96–112)
CHOLEST SERPL-MCNC: 172 MG/DL (ref 100–199)
CO2 SERPL-SCNC: 25 MMOL/L (ref 20–33)
CREAT SERPL-MCNC: 0.57 MG/DL (ref 0.5–1.4)
EOSINOPHIL # BLD AUTO: 0.4 K/UL (ref 0–0.51)
EOSINOPHIL NFR BLD: 7 % (ref 0–6.9)
ERYTHROCYTE [DISTWIDTH] IN BLOOD BY AUTOMATED COUNT: 47.8 FL (ref 35.9–50)
EST. AVERAGE GLUCOSE BLD GHB EST-MCNC: 117 MG/DL
FASTING STATUS PATIENT QL REPORTED: NORMAL
FERRITIN SERPL-MCNC: 45.6 NG/ML (ref 10–291)
FOLATE SERPL-MCNC: 11.7 NG/ML
GFR SERPLBLD CREATININE-BSD FMLA CKD-EPI: 92 ML/MIN/1.73 M 2
GLOBULIN SER CALC-MCNC: 2.9 G/DL (ref 1.9–3.5)
GLUCOSE SERPL-MCNC: 97 MG/DL (ref 65–99)
HBA1C MFR BLD: 5.7 % (ref 4–5.6)
HCT VFR BLD AUTO: 44.3 % (ref 37–47)
HDLC SERPL-MCNC: 44 MG/DL
HGB BLD-MCNC: 13.9 G/DL (ref 12–16)
IMM GRANULOCYTES # BLD AUTO: 0.01 K/UL (ref 0–0.11)
IMM GRANULOCYTES NFR BLD AUTO: 0.2 % (ref 0–0.9)
IRON SATN MFR SERPL: 34 % (ref 15–55)
IRON SERPL-MCNC: 90 UG/DL (ref 40–170)
LDLC SERPL CALC-MCNC: 87 MG/DL
LYMPHOCYTES # BLD AUTO: 1.46 K/UL (ref 1–4.8)
LYMPHOCYTES NFR BLD: 25.5 % (ref 22–41)
MCH RBC QN AUTO: 28.7 PG (ref 27–33)
MCHC RBC AUTO-ENTMCNC: 31.4 G/DL (ref 32.2–35.5)
MCV RBC AUTO: 91.5 FL (ref 81.4–97.8)
MONOCYTES # BLD AUTO: 0.49 K/UL (ref 0–0.85)
MONOCYTES NFR BLD AUTO: 8.6 % (ref 0–13.4)
NEUTROPHILS # BLD AUTO: 3.31 K/UL (ref 1.82–7.42)
NEUTROPHILS NFR BLD: 57.8 % (ref 44–72)
NRBC # BLD AUTO: 0 K/UL
NRBC BLD-RTO: 0 /100 WBC (ref 0–0.2)
PLATELET # BLD AUTO: 221 K/UL (ref 164–446)
PMV BLD AUTO: 11.3 FL (ref 9–12.9)
POTASSIUM SERPL-SCNC: 4.4 MMOL/L (ref 3.6–5.5)
PROT SERPL-MCNC: 7.1 G/DL (ref 6–8.2)
RBC # BLD AUTO: 4.84 M/UL (ref 4.2–5.4)
SODIUM SERPL-SCNC: 144 MMOL/L (ref 135–145)
TIBC SERPL-MCNC: 262 UG/DL (ref 250–450)
TRIGL SERPL-MCNC: 204 MG/DL (ref 0–149)
TSH SERPL DL<=0.005 MIU/L-ACNC: 1.85 UIU/ML (ref 0.38–5.33)
UIBC SERPL-MCNC: 172 UG/DL (ref 110–370)
VIT B12 SERPL-MCNC: 483 PG/ML (ref 211–911)
WBC # BLD AUTO: 5.7 K/UL (ref 4.8–10.8)

## 2024-06-12 PROCEDURE — 84443 ASSAY THYROID STIM HORMONE: CPT

## 2024-06-12 PROCEDURE — 82306 VITAMIN D 25 HYDROXY: CPT

## 2024-06-12 PROCEDURE — 82728 ASSAY OF FERRITIN: CPT

## 2024-06-12 PROCEDURE — 36415 COLL VENOUS BLD VENIPUNCTURE: CPT

## 2024-06-12 PROCEDURE — 83540 ASSAY OF IRON: CPT

## 2024-06-12 PROCEDURE — 85025 COMPLETE CBC W/AUTO DIFF WBC: CPT

## 2024-06-12 PROCEDURE — 83036 HEMOGLOBIN GLYCOSYLATED A1C: CPT | Mod: GA

## 2024-06-12 PROCEDURE — 80053 COMPREHEN METABOLIC PANEL: CPT

## 2024-06-12 PROCEDURE — 82607 VITAMIN B-12: CPT

## 2024-06-12 PROCEDURE — 83550 IRON BINDING TEST: CPT

## 2024-06-12 PROCEDURE — 80061 LIPID PANEL: CPT

## 2024-06-12 PROCEDURE — 82746 ASSAY OF FOLIC ACID SERUM: CPT

## 2024-07-09 ENCOUNTER — OFFICE VISIT (OUTPATIENT)
Dept: MEDICAL GROUP | Facility: CLINIC | Age: 79
End: 2024-07-09
Payer: MEDICARE

## 2024-07-09 VITALS
DIASTOLIC BLOOD PRESSURE: 70 MMHG | HEIGHT: 62 IN | SYSTOLIC BLOOD PRESSURE: 126 MMHG | RESPIRATION RATE: 18 BRPM | TEMPERATURE: 96.8 F | HEART RATE: 66 BPM | OXYGEN SATURATION: 94 % | BODY MASS INDEX: 38.01 KG/M2 | WEIGHT: 206.57 LBS

## 2024-07-09 DIAGNOSIS — G62.9 NEUROPATHY: ICD-10-CM

## 2024-07-09 DIAGNOSIS — E03.9 ACQUIRED HYPOTHYROIDISM: ICD-10-CM

## 2024-07-09 DIAGNOSIS — M19.90 ARTHRITIS: ICD-10-CM

## 2024-07-09 DIAGNOSIS — M54.2 NECK PAIN: ICD-10-CM

## 2024-07-09 DIAGNOSIS — N39.0 RECURRENT UTI: ICD-10-CM

## 2024-07-09 DIAGNOSIS — M50.30 DEGENERATION, INTERVERTEBRAL DISC, CERVICAL: ICD-10-CM

## 2024-07-09 DIAGNOSIS — T78.40XD ALLERGY, SUBSEQUENT ENCOUNTER: ICD-10-CM

## 2024-07-09 DIAGNOSIS — R73.03 PREDIABETES: ICD-10-CM

## 2024-07-09 DIAGNOSIS — M72.2 PLANTAR FASCIITIS: ICD-10-CM

## 2024-07-09 PROCEDURE — 3074F SYST BP LT 130 MM HG: CPT | Performed by: PHYSICIAN ASSISTANT

## 2024-07-09 PROCEDURE — 3078F DIAST BP <80 MM HG: CPT | Performed by: PHYSICIAN ASSISTANT

## 2024-07-09 PROCEDURE — 99214 OFFICE O/P EST MOD 30 MIN: CPT | Performed by: PHYSICIAN ASSISTANT

## 2024-07-09 RX ORDER — FLUTICASONE PROPIONATE 50 MCG
2 SPRAY, SUSPENSION (ML) NASAL DAILY
Qty: 16 ML | Refills: 6 | Status: SHIPPED | OUTPATIENT
Start: 2024-07-09

## 2024-07-09 RX ORDER — SULFAMETHOXAZOLE AND TRIMETHOPRIM 400; 80 MG/1; MG/1
1 TABLET ORAL DAILY
Qty: 90 TABLET | Refills: 2 | Status: SHIPPED | OUTPATIENT
Start: 2024-07-09

## 2024-07-09 RX ORDER — CELECOXIB 200 MG/1
200 CAPSULE ORAL DAILY
Qty: 90 CAPSULE | Refills: 2 | Status: SHIPPED | OUTPATIENT
Start: 2024-07-09

## 2024-07-09 RX ORDER — LEVOTHYROXINE SODIUM 0.03 MG/1
25 TABLET ORAL
Qty: 90 TABLET | Refills: 2 | Status: SHIPPED | OUTPATIENT
Start: 2024-07-09

## 2024-07-09 RX ORDER — GABAPENTIN 300 MG/1
300 CAPSULE ORAL 3 TIMES DAILY
Qty: 270 CAPSULE | Refills: 1 | Status: SHIPPED | OUTPATIENT
Start: 2024-07-09

## 2024-07-09 ASSESSMENT — FIBROSIS 4 INDEX: FIB4 SCORE: 1.852941176470588235

## 2024-08-09 DIAGNOSIS — E78.49 OTHER HYPERLIPIDEMIA: ICD-10-CM

## 2024-08-12 NOTE — TELEPHONE ENCOUNTER
Received request via: Patient    Was the patient seen in the last year in this department? Yes    Does the patient have an active prescription (recently filled or refills available) for medication(s) requested?  Yes    Pharmacy Name: [facility] in [place]    Does the patient have [facility] Plus and need 100-day supply? (This applies to ALL medications) Patient does not have SCP

## 2024-08-13 RX ORDER — ROSUVASTATIN CALCIUM 10 MG/1
10 TABLET, COATED ORAL EVERY EVENING
Qty: 90 TABLET | Refills: 2 | Status: SHIPPED | OUTPATIENT
Start: 2024-08-13

## 2024-08-30 ENCOUNTER — APPOINTMENT (OUTPATIENT)
Dept: MEDICAL GROUP | Facility: PHYSICIAN GROUP | Age: 79
End: 2024-08-30
Payer: MEDICARE

## 2024-09-18 ENCOUNTER — APPOINTMENT (OUTPATIENT)
Dept: MEDICAL GROUP | Facility: CLINIC | Age: 79
End: 2024-09-18
Payer: MEDICARE

## 2024-09-18 VITALS
HEIGHT: 62 IN | BODY MASS INDEX: 37 KG/M2 | OXYGEN SATURATION: 95 % | WEIGHT: 201.06 LBS | TEMPERATURE: 98.4 F | RESPIRATION RATE: 16 BRPM | HEART RATE: 70 BPM | DIASTOLIC BLOOD PRESSURE: 74 MMHG | SYSTOLIC BLOOD PRESSURE: 124 MMHG

## 2024-09-18 DIAGNOSIS — R09.81 CHRONIC NASAL CONGESTION: ICD-10-CM

## 2024-09-18 DIAGNOSIS — R19.7 DIARRHEA, UNSPECIFIED TYPE: ICD-10-CM

## 2024-09-18 DIAGNOSIS — E78.49 OTHER HYPERLIPIDEMIA: ICD-10-CM

## 2024-09-18 DIAGNOSIS — R73.03 PREDIABETES: ICD-10-CM

## 2024-09-18 DIAGNOSIS — M54.50 ACUTE BILATERAL LOW BACK PAIN WITHOUT SCIATICA: ICD-10-CM

## 2024-09-18 DIAGNOSIS — R09.82 POST-NASAL DRAINAGE: ICD-10-CM

## 2024-09-18 PROCEDURE — 99214 OFFICE O/P EST MOD 30 MIN: CPT | Performed by: PHYSICIAN ASSISTANT

## 2024-09-18 PROCEDURE — 3074F SYST BP LT 130 MM HG: CPT | Performed by: PHYSICIAN ASSISTANT

## 2024-09-18 PROCEDURE — 3078F DIAST BP <80 MM HG: CPT | Performed by: PHYSICIAN ASSISTANT

## 2024-09-18 RX ORDER — AMOXICILLIN 500 MG/1
CAPSULE ORAL
COMMUNITY
Start: 2024-09-11

## 2024-09-18 ASSESSMENT — FIBROSIS 4 INDEX: FIB4 SCORE: 1.88

## 2024-09-18 NOTE — PROGRESS NOTES
cc:  abdominal problem    Subjective:     Soo Huff is a 79 y.o. female presenting for abdominal problem        History of Present Illness  The patient is a 79-year-old female here today to discuss an abdominal issue.    She experienced an adverse reaction after consuming spinach with butter, which led to frequent bathroom visits and severe pain that hindered her mobility. She suspects constipation as the cause, even though she takes stool softeners daily. The pain, described as being on both sides of her stomach, has since subsided. She also reports feeling gassy and notes no pain radiating to her right shoulder. She recalls a similar episode occurring a month ago, which she believes may have been due to overeating. She has not had a steady meal since then. Additionally, she mentions that she tends to get constipated if she doesn't take her stool softeners for 2 or 3 days, especially when consuming cheese, which she eats frequently.    She is scheduled for a tooth extraction this afternoon and is seeking pain medication.    She has been experiencing symptoms similar to a cold for the past 6 months, including coughing up white phlegm, particularly at night. She does not believe these symptoms are related to her asthma, which she manages with Advair. She also reports wheezing and postnasal drainage. She is currently taking Claritin and Singulair and uses Flonase nasal spray.       Review of systems:  See above.   Denies any symptoms unless previously indicated.        Current Outpatient Medications:     amoxicillin (AMOXIL) 500 MG Cap, TAKE 1 CAPSULE BY MOUTH THREE TIMES DAILY UNTIL GONE, Disp: , Rfl:     rosuvastatin (CRESTOR) 10 MG Tab, TAKE ONE TABLET BY MOUTH EVERY EVENING, Disp: 90 Tablet, Rfl: 2    levothyroxine (SYNTHROID) 25 MCG Tab, Take 1 Tablet by mouth every morning on an empty stomach., Disp: 90 Tablet, Rfl: 2    fluticasone (FLONASE) 50 MCG/ACT nasal spray, Administer 2 Sprays into affected  "nostril(S) every day., Disp: 16 mL, Rfl: 6    celecoxib (CELEBREX) 200 MG Cap, Take 1 Capsule by mouth every day., Disp: 90 Capsule, Rfl: 2    sulfamethoxazole-trimethoprim (BACTRIM) 400-80 MG Tab, Take 1 Tablet by mouth every day., Disp: 90 Tablet, Rfl: 2    gabapentin (NEURONTIN) 300 MG Cap, Take 1 Capsule by mouth 3 times a day., Disp: 270 Capsule, Rfl: 1    loratadine (CLARITIN) 10 MG Tab, Take 1 Tablet by mouth every day., Disp: 90 Tablet, Rfl: 0    montelukast (SINGULAIR) 10 MG Tab, TAKE 1 TABLET BY MOUTH DAILY, Disp: 90 Tablet, Rfl: 1    ipratropium-albuterol (DUONEB) 0.5-2.5 (3) MG/3ML nebulizer solution, USE 1 VIAL IN NEBULIZER 4 TIMES DAILY, Disp: 360 Each, Rfl: 1    fluticasone-salmeterol (ADVAIR) 500-50 MCG/ACT AEROSOL POWDER, BREATH ACTIVATED, Inhale 1 Puff every 12 hours., Disp: 180 Each, Rfl: 2    Allergies, past medical history, past surgical history, family history, social history reviewed and updated    Objective:     Vitals: /74 (BP Location: Left arm, Patient Position: Sitting, BP Cuff Size: Adult long)   Pulse 70   Temp 36.9 °C (98.4 °F) (Temporal)   Resp 16   Ht 1.575 m (5' 2\")   Wt 91.2 kg (201 lb 1 oz)   SpO2 95%   BMI 36.77 kg/m²   General: Alert, pleasant, NAD  EYES:   PERRL, EOMI, no icterus or pallor.  Conjunctivae and lids normal.   HENT:  Normocephalic.  External ears normal.   Neck supple.     Heart: Regular rate and rhythm.  S1 and S2 normal.  No murmurs appreciated.  Respiratory: Normal respiratory effort.  Clear to auscultation bilaterally.  Abdomen: obese  Skin: Warm, dry, no rashes.  Musculoskeletal: Gait is normal.  Moves all extremities well.    Extremities: normal range of motion all extremities.   Neurological: No tremors, sensation grossly intact,  CN2-12 intact.  Psych:  Affect/mood is normal, judgement is good, memory is intact, grooming is appropriate.    Physical Exam  Normal lung function.       Results  Laboratory Studies  Alkaline phosphatase mildly " elevated. Kidney function is okay. Liver function is okay. Triglycerides were elevated. Total cholesterol isn't looking too bad. Average sugar is 5.7. Thyroid was okay. Vitamin D is okay. Red and white blood cells were okay. Iron levels okay. B12 and folic acid were okay. Iron stores were okay. Kidney filtration was okay.      Latest Reference Range & Units 06/12/24 11:04   WBC 4.8 - 10.8 K/uL 5.7   RBC 4.20 - 5.40 M/uL 4.84   Hemoglobin 12.0 - 16.0 g/dL 13.9   Hematocrit 37.0 - 47.0 % 44.3   MCV 81.4 - 97.8 fL 91.5   MCH 27.0 - 33.0 pg 28.7   MCHC 32.2 - 35.5 g/dL 31.4 (L)   RDW 35.9 - 50.0 fL 47.8   Platelet Count 164 - 446 K/uL 221   MPV 9.0 - 12.9 fL 11.3   Neutrophils-Polys 44.00 - 72.00 % 57.80   Neutrophils (Absolute) 1.82 - 7.42 K/uL 3.31   Lymphocytes 22.00 - 41.00 % 25.50   Lymphs (Absolute) 1.00 - 4.80 K/uL 1.46   Monocytes 0.00 - 13.40 % 8.60   Monos (Absolute) 0.00 - 0.85 K/uL 0.49   Eosinophils 0.00 - 6.90 % 7.00 (H)   Eos (Absolute) 0.00 - 0.51 K/uL 0.40   Basophils 0.00 - 1.80 % 0.90   Baso (Absolute) 0.00 - 0.12 K/uL 0.05   Immature Granulocytes 0.00 - 0.90 % 0.20   Immature Granulocytes (abs) 0.00 - 0.11 K/uL 0.01   Nucleated RBC 0.00 - 0.20 /100 WBC 0.00   NRBC (Absolute) K/uL 0.00   Sodium 135 - 145 mmol/L 144   Potassium 3.6 - 5.5 mmol/L 4.4   Chloride 96 - 112 mmol/L 108   Co2 20 - 33 mmol/L 25   Anion Gap 7.0 - 16.0  11.0   Glucose 65 - 99 mg/dL 97   Bun 8 - 22 mg/dL 15   Creatinine 0.50 - 1.40 mg/dL 0.57   GFR (CKD-EPI) >60 mL/min/1.73 m 2 92   Calcium 8.5 - 10.5 mg/dL 9.4   Correct Calcium 8.5 - 10.5 mg/dL 9.2   AST(SGOT) 12 - 45 U/L 21   ALT(SGPT) 2 - 50 U/L 16   Alkaline Phosphatase 30 - 99 U/L 113 (H)   Total Bilirubin 0.1 - 1.5 mg/dL 0.6   Albumin 3.2 - 4.9 g/dL 4.2   Total Protein 6.0 - 8.2 g/dL 7.1   Globulin 1.9 - 3.5 g/dL 2.9   A-G Ratio g/dL 1.4   Iron 40 - 170 ug/dL 90   Total Iron Binding 250 - 450 ug/dL 262   % Saturation 15 - 55 % 34   Unsat Iron Binding 110 - 370 ug/dL 172    Glycohemoglobin 4.0 - 5.6 % 5.7 (H)   Estim. Avg Glu mg/dL 117   Fasting Status  Fasting   Cholesterol,Tot 100 - 199 mg/dL 172   Triglycerides 0 - 149 mg/dL 204 (H)   HDL >=40 mg/dL 44   LDL <100 mg/dL 87   25-Hydroxy   Vitamin D 25 30 - 100 ng/mL 34   Ferritin 10.0 - 291.0 ng/mL 45.6   Folate -Folic Acid >4.0 ng/mL 11.7   Vitamin B12 -True Cobalamin 211 - 911 pg/mL 483   TSH 0.380 - 5.330 uIU/mL 1.850   (L): Data is abnormally low  (H): Data is abnormally high    Assessment/Plan:     Soo was seen today for gi problem.    Diagnoses and all orders for this visit:    Acute bilateral low back pain without sciatica    Diarrhea, unspecified type    Prediabetes  -     Comp Metabolic Panel; Future  -     Lipid Profile; Future  -     HEMOGLOBIN A1C; Future    Other hyperlipidemia  -     Comp Metabolic Panel; Future  -     Lipid Profile; Future    Chronic nasal congestion  -     CT-LOCALIZATION LIMITED SINUSES; Future    Post-nasal drainage        Assessment & Plan  1. Lower back pain/diarrhea.  Symptoms do not align with gallbladder issues. Differential diagnosis includes colitis or potential food poisoning. An ultrasound was offered but declined as symptoms have resolved. A complete blood count and metabolic panel were also recommended but declined. She will notify if symptoms return or worsen.    2. Prediabetes.  Discussed lab results indicating stable average sugar levels. Labs will be repeated in 6 months.    3. Hyperlipidemia.  Discussed lab results showing elevated triglycerides but acceptable total cholesterol. Labs will be repeated in 6 months.    4. Chronic nasal congestion/postnasal drainage.  Symptoms persist despite using Flonase, Singulair, and Claritin. Reports sinus pressure and postnasal drainage. A CT of the sinuses will be obtained to evaluate further. She will be contacted within a week to schedule the CT. If no contact is made, she should notify the office.    Follow-up  The patient will follow up in  6 weeks.    Return in about 6 weeks (around 10/30/2024), or if symptoms worsen or fail to improve, for ct results sinuses.  .    Please note that this dictation was created using voice recognition software. I have made every reasonable attempt to correct obvious errors, but expect that there are errors of grammar and possible content that I did not discover before finalizing note.

## 2024-09-27 ENCOUNTER — HOSPITAL ENCOUNTER (OUTPATIENT)
Dept: LAB | Facility: MEDICAL CENTER | Age: 79
End: 2024-09-27
Attending: PHYSICIAN ASSISTANT
Payer: MEDICARE

## 2024-09-27 DIAGNOSIS — E03.9 ACQUIRED HYPOTHYROIDISM: ICD-10-CM

## 2024-09-27 DIAGNOSIS — R73.03 PREDIABETES: ICD-10-CM

## 2024-09-27 DIAGNOSIS — E78.49 OTHER HYPERLIPIDEMIA: ICD-10-CM

## 2024-09-27 LAB
ALBUMIN SERPL BCP-MCNC: 4.2 G/DL (ref 3.2–4.9)
ALBUMIN/GLOB SERPL: 1.4 G/DL
ALP SERPL-CCNC: 116 U/L (ref 30–99)
ALT SERPL-CCNC: 21 U/L (ref 2–50)
ANION GAP SERPL CALC-SCNC: 16 MMOL/L (ref 7–16)
AST SERPL-CCNC: 21 U/L (ref 12–45)
BILIRUB SERPL-MCNC: 0.5 MG/DL (ref 0.1–1.5)
BUN SERPL-MCNC: 16 MG/DL (ref 8–22)
CALCIUM ALBUM COR SERPL-MCNC: 9.3 MG/DL (ref 8.5–10.5)
CALCIUM SERPL-MCNC: 9.5 MG/DL (ref 8.5–10.5)
CHLORIDE SERPL-SCNC: 108 MMOL/L (ref 96–112)
CHOLEST SERPL-MCNC: 217 MG/DL (ref 100–199)
CO2 SERPL-SCNC: 19 MMOL/L (ref 20–33)
CREAT SERPL-MCNC: 0.63 MG/DL (ref 0.5–1.4)
EST. AVERAGE GLUCOSE BLD GHB EST-MCNC: 117 MG/DL
FASTING STATUS PATIENT QL REPORTED: NORMAL
GFR SERPLBLD CREATININE-BSD FMLA CKD-EPI: 90 ML/MIN/1.73 M 2
GLOBULIN SER CALC-MCNC: 2.9 G/DL (ref 1.9–3.5)
GLUCOSE SERPL-MCNC: 94 MG/DL (ref 65–99)
HBA1C MFR BLD: 5.7 % (ref 4–5.6)
HDLC SERPL-MCNC: 43 MG/DL
LDLC SERPL CALC-MCNC: 126 MG/DL
POTASSIUM SERPL-SCNC: 4.1 MMOL/L (ref 3.6–5.5)
PROT SERPL-MCNC: 7.1 G/DL (ref 6–8.2)
SODIUM SERPL-SCNC: 143 MMOL/L (ref 135–145)
TRIGL SERPL-MCNC: 239 MG/DL (ref 0–149)

## 2024-09-27 PROCEDURE — 36415 COLL VENOUS BLD VENIPUNCTURE: CPT | Mod: GA

## 2024-09-27 PROCEDURE — 80053 COMPREHEN METABOLIC PANEL: CPT

## 2024-09-27 PROCEDURE — 83036 HEMOGLOBIN GLYCOSYLATED A1C: CPT | Mod: GA

## 2024-09-27 PROCEDURE — 80061 LIPID PANEL: CPT

## 2024-09-27 PROCEDURE — 84443 ASSAY THYROID STIM HORMONE: CPT

## 2024-09-28 LAB — TSH SERPL DL<=0.005 MIU/L-ACNC: 3.73 UIU/ML (ref 0.38–5.33)

## 2024-10-02 DIAGNOSIS — J30.2 SEASONAL ALLERGIES: ICD-10-CM

## 2024-10-02 RX ORDER — LORATADINE 10 MG/1
10 TABLET ORAL DAILY
Qty: 90 TABLET | Refills: 2 | Status: SHIPPED | OUTPATIENT
Start: 2024-10-02

## 2024-10-09 DIAGNOSIS — T78.40XD ALLERGY, SUBSEQUENT ENCOUNTER: ICD-10-CM

## 2024-10-10 RX ORDER — FLUTICASONE PROPIONATE 50 MCG
2 SPRAY, SUSPENSION (ML) NASAL DAILY
Qty: 16 ML | Refills: 6 | Status: SHIPPED | OUTPATIENT
Start: 2024-10-10

## 2024-10-25 DIAGNOSIS — R09.82 POST-NASAL DRIP: ICD-10-CM

## 2024-10-29 ENCOUNTER — OFFICE VISIT (OUTPATIENT)
Dept: MEDICAL GROUP | Facility: CLINIC | Age: 79
End: 2024-10-29
Payer: MEDICARE

## 2024-10-29 VITALS
HEART RATE: 66 BPM | OXYGEN SATURATION: 93 % | BODY MASS INDEX: 38.14 KG/M2 | HEIGHT: 62 IN | TEMPERATURE: 97.2 F | DIASTOLIC BLOOD PRESSURE: 74 MMHG | WEIGHT: 207.23 LBS | SYSTOLIC BLOOD PRESSURE: 122 MMHG | RESPIRATION RATE: 18 BRPM

## 2024-10-29 DIAGNOSIS — R09.81 CHRONIC NASAL CONGESTION: ICD-10-CM

## 2024-10-29 DIAGNOSIS — R73.03 PREDIABETES: ICD-10-CM

## 2024-10-29 DIAGNOSIS — M21.612 BUNION OF GREAT TOE OF LEFT FOOT: ICD-10-CM

## 2024-10-29 DIAGNOSIS — R74.8 ELEVATED ALKALINE PHOSPHATASE LEVEL: ICD-10-CM

## 2024-10-29 DIAGNOSIS — N39.0 RECURRENT UTI: ICD-10-CM

## 2024-10-29 DIAGNOSIS — E78.49 OTHER HYPERLIPIDEMIA: ICD-10-CM

## 2024-10-29 PROBLEM — H91.90 HARD OF HEARING: Status: ACTIVE | Noted: 2024-10-29

## 2024-10-29 RX ORDER — MONTELUKAST SODIUM 10 MG/1
10 TABLET ORAL DAILY
Qty: 90 TABLET | Refills: 3 | Status: SHIPPED | OUTPATIENT
Start: 2024-10-29

## 2024-10-29 ASSESSMENT — FIBROSIS 4 INDEX: FIB4 SCORE: 1.64

## 2024-11-05 ENCOUNTER — TELEPHONE (OUTPATIENT)
Dept: MEDICAL GROUP | Facility: CLINIC | Age: 79
End: 2024-11-05
Payer: MEDICARE

## 2024-11-05 DIAGNOSIS — R51.9 INTRACTABLE HEADACHE, UNSPECIFIED CHRONICITY PATTERN, UNSPECIFIED HEADACHE TYPE: ICD-10-CM

## 2024-11-05 DIAGNOSIS — R09.81 CHRONIC NASAL CONGESTION: ICD-10-CM

## 2024-11-05 NOTE — TELEPHONE ENCOUNTER
VOICEMAIL  1. Caller Name: Adalgisa                      Call Back Number: [phone]    2. Message: received vm from [facility] stating that Radiologist says that they do not have the ability to do the CT limited that was ordered. It would need to be changed to Maxifacial w/o.     3. Patient approves office to leave a detailed voicemail/MyChart message: N\A

## 2024-12-04 ENCOUNTER — OFFICE VISIT (OUTPATIENT)
Dept: URGENT CARE | Facility: CLINIC | Age: 79
End: 2024-12-04
Payer: MEDICARE

## 2024-12-04 ENCOUNTER — APPOINTMENT (OUTPATIENT)
Dept: RADIOLOGY | Facility: IMAGING CENTER | Age: 79
End: 2024-12-04
Attending: STUDENT IN AN ORGANIZED HEALTH CARE EDUCATION/TRAINING PROGRAM
Payer: MEDICARE

## 2024-12-04 VITALS
RESPIRATION RATE: 12 BRPM | DIASTOLIC BLOOD PRESSURE: 76 MMHG | HEART RATE: 86 BPM | BODY MASS INDEX: 35.74 KG/M2 | TEMPERATURE: 99.4 F | HEIGHT: 62 IN | WEIGHT: 194.22 LBS | SYSTOLIC BLOOD PRESSURE: 122 MMHG

## 2024-12-04 DIAGNOSIS — R05.1 ACUTE COUGH: ICD-10-CM

## 2024-12-04 LAB
FLUAV RNA SPEC QL NAA+PROBE: NEGATIVE
FLUBV RNA SPEC QL NAA+PROBE: NEGATIVE
RSV RNA SPEC QL NAA+PROBE: NEGATIVE
SARS-COV-2 RNA RESP QL NAA+PROBE: NEGATIVE

## 2024-12-04 PROCEDURE — 3078F DIAST BP <80 MM HG: CPT | Performed by: STUDENT IN AN ORGANIZED HEALTH CARE EDUCATION/TRAINING PROGRAM

## 2024-12-04 PROCEDURE — 99213 OFFICE O/P EST LOW 20 MIN: CPT | Performed by: STUDENT IN AN ORGANIZED HEALTH CARE EDUCATION/TRAINING PROGRAM

## 2024-12-04 PROCEDURE — 71046 X-RAY EXAM CHEST 2 VIEWS: CPT | Mod: TC | Performed by: STUDENT IN AN ORGANIZED HEALTH CARE EDUCATION/TRAINING PROGRAM

## 2024-12-04 PROCEDURE — 0241U POCT CEPHEID COV-2, FLU A/B, RSV - PCR: CPT | Performed by: STUDENT IN AN ORGANIZED HEALTH CARE EDUCATION/TRAINING PROGRAM

## 2024-12-04 PROCEDURE — 3074F SYST BP LT 130 MM HG: CPT | Performed by: STUDENT IN AN ORGANIZED HEALTH CARE EDUCATION/TRAINING PROGRAM

## 2024-12-04 ASSESSMENT — FIBROSIS 4 INDEX: FIB4 SCORE: 1.64

## 2024-12-04 ASSESSMENT — ENCOUNTER SYMPTOMS
COUGH: 1
WHEEZING: 0
CARDIOVASCULAR NEGATIVE: 1
SORE THROAT: 1
FEVER: 0
SPUTUM PRODUCTION: 1
SHORTNESS OF BREATH: 1
STRIDOR: 0

## 2024-12-04 NOTE — PROGRESS NOTES
Subjective:   Soo Huff is a 79 y.o. female who presents for Sore Throat (3 days ), Headache, Cough, and Nasal Congestion      HPI:  79-year-old female presents with sore throat x 3 days cough congestion and significant runny nose.  She reports she has been battling a cold on and off for few months feels ill for a few days then gets better couple weeks later gets ill again.  She reports this illness started approximately 5 days ago started as congestion severe runny nose and development of headache and sinus pressure but now has sore throat significant cough and lots of sputum and phlegm.  She reports a history of asthma and used to use Advair as well as DuoNeb nebulizers but reports has not used them or needed to use them for multiple months.  She does report some shortness of breath specifically when she is coughing and some difficulty getting deep breath and when her nose is clogged.  She denies any significant fevers or chills but generally has fatigue and feeling rundown.        Review of Systems   Constitutional:  Positive for malaise/fatigue. Negative for fever.   HENT:  Positive for congestion and sore throat.    Respiratory:  Positive for cough, sputum production and shortness of breath. Negative for wheezing and stridor.    Cardiovascular: Negative.        Medications:    celecoxib Caps  fluticasone  fluticasone-salmeterol Aepb  gabapentin Caps  ipratropium-albuterol  levothyroxine Tabs  loratadine Tabs  montelukast Tabs  rosuvastatin Tabs  sulfamethoxazole-trimethoprim Tabs    Allergies: Doxycycline and Hydrocodone-acetaminophen    Problem List: Soo Huff does not have any pertinent problems on file.    Surgical History:  Past Surgical History:   Procedure Laterality Date    PB SHLDR ARTHROSCOP,SURG,W/ROTAT CUFF REPB Right 9/12/2022    Procedure: RIGHT SHOULDER ARTHROSCOPY ROTATOR CUFF REPAIR, SUBACROMIAL DECOMPRESSION, LABRAL DEBRIDEMENT, BICEPS TENOTOMY;  Surgeon: Bhupendra Campbell M.D.;   "Location: SURGERY [facility];  Service: Orthopedics    NM LDR ARTHROSCOP,PART ACROMIOPLAS Right 9/12/2022    Procedure: DECOMPRESSION, SHOULDER, ARTHROSCOPIC;  Surgeon: Bhupendra Campbell M.D.;  Location: SURGERY [facility];  Service: Orthopedics    NM SHLDR ARTHROSCOP EXTEN DEBRIDE 3+ Right 9/12/2022    Procedure: ARTHROSCOPY, SHOULDER, WITH EXTENSIVE DEBRIDEMENT;  Surgeon: Bhupendra Campbell M.D.;  Location: SURGERY [facility];  Service: Orthopedics    NM UNLISTED PROC, ARTHROSCOPY Right 9/12/2022    Procedure: ARTHROSCOPY, SHOULDER, WITH BICEPS TENOTOMY;  Surgeon: Bhupendra Campbell M.D.;  Location: SURGERY [facility];  Service: Orthopedics    HYSTERECTOMY LAPAROSCOPY      OTHER Bilateral     Breast cyst excision in my 30's       Past Social Hx: Soo Huff  reports that she has quit smoking. Her smoking use included cigarettes. She has never used smokeless tobacco. She reports that she does not currently use alcohol. She reports that she does not use drugs.     Past Family Hx:  Soo Huff family history includes Diabetes in her daughter, maternal aunt, mother, and paternal aunt; Leukemia in her son.     Problem list, medications, and allergies reviewed by myself today in Epic.     Objective:     /76   Pulse 86   Temp 37.4 °C (99.4 °F) (Temporal)   Resp 12   Ht 1.575 m (5' 2\")   Wt 88.1 kg (194 lb 3.6 oz)   BMI 35.52 kg/m²     Physical Exam  Constitutional:       Appearance: Normal appearance.   HENT:      Head: Normocephalic and atraumatic.      Right Ear: Tympanic membrane normal.      Left Ear: Tympanic membrane normal.      Nose: Congestion and rhinorrhea present.      Mouth/Throat:      Mouth: Mucous membranes are moist.      Pharynx: Posterior oropharyngeal erythema present. No oropharyngeal exudate.   Eyes:      Extraocular Movements: Extraocular movements intact.      Conjunctiva/sclera: Conjunctivae normal.   Cardiovascular:      Rate and Rhythm: Normal rate and regular rhythm.      " Heart sounds: Normal heart sounds.   Pulmonary:      Effort: Pulmonary effort is normal.      Breath sounds: Transmitted upper airway sounds present. Examination of the right-lower field reveals wheezing. Wheezing present. No decreased breath sounds.   Musculoskeletal:      Cervical back: Normal range of motion.   Lymphadenopathy:      Cervical: No cervical adenopathy.   Neurological:      Mental Status: She is alert.         Assessment/Plan:     Diagnosis and associated orders:     1. Acute cough  POCT CoV-2, Flu A/B, RSV by PCR    DX-CHEST-2 VIEWS         Comments/MDM:     1. Acute cough  Due to productive cough, age and wheezing in right lower quadrant will get chest x-ray today.  - Will also get viral testing  - We discussed over-the-counter remedies for congestion and runny nose including Flonase 1 actuation in each nostril once a day use of an antihistamine daily such as Zyrtec or Claritin, and use of guanfacine and dextromethorphan to help thin mucus and clear it from her upper airways and nose.  -Can continue with Tylenol ibuprofen as needed.  -I did recommend patient restart her Advair while she is sick and can use the DuoNebs as needed for any feelings of shortness of breath or any wheezing per her prior directions.  Please return to clinic for additional evaluation if you develop new fever, any significant change in your symptoms shortness of breath, ongoing wheezing or no improvement in your symptoms in the next week.  - POCT CoV-2, Flu A/B, RSV by PCR  - DX-CHEST-2 VIEWS; Future    HISTORY/REASON FOR EXAM:  Cough        TECHNIQUE/EXAM DESCRIPTION AND NUMBER OF VIEWS:  Two views of the chest.     COMPARISON:  None.     FINDINGS:     No pulmonary infiltrates or consolidations are noted.  No pleural effusions, no pneumothorax are appreciated.  Normal cardiopericardial silhouette.        IMPRESSION:        1. No active cardiopulmonary abnormalities are identified.       Differential diagnosis, natural  history, supportive care, and indications for immediate follow-up discussed.    Advised the patient to follow-up with the primary care physician for recheck, reevaluation, and consideration of further management.    Please note that this dictation was created using voice recognition software. I have made a reasonable attempt to correct obvious errors, but I expect that there are errors of grammar and possibly content that I did not discover before finalizing the note.    Ismael Coronel M.D.

## 2025-01-08 DIAGNOSIS — G62.9 NEUROPATHY: ICD-10-CM

## 2025-01-08 NOTE — TELEPHONE ENCOUNTER
Received request via: Patient    Was the patient seen in the last year in this department? Yes    Does the patient have an active prescription (recently filled or refills available) for medication(s) requested?  yes    Pharmacy Name: [facility] [place]    Does the patient have shelter Plus and need 100-day supply? (This applies to ALL medications) Patient does not have SCP

## 2025-01-09 RX ORDER — GABAPENTIN 300 MG/1
300 CAPSULE ORAL 3 TIMES DAILY
Qty: 270 CAPSULE | Refills: 1 | Status: SHIPPED | OUTPATIENT
Start: 2025-01-09

## 2025-03-20 ENCOUNTER — APPOINTMENT (OUTPATIENT)
Dept: URGENT CARE | Facility: CLINIC | Age: 80
End: 2025-03-20
Payer: MEDICARE

## 2025-04-14 DIAGNOSIS — M19.90 ARTHRITIS: ICD-10-CM

## 2025-04-14 NOTE — TELEPHONE ENCOUNTER
Requested Prescriptions     Pending Prescriptions Disp Refills    celecoxib (CELEBREX) 200 MG Cap [Pharmacy Med Name: CELECOXIB 200 MG CAPSULE] 90 Capsule 2     Sig: TAKE 1 CAPSULE BY MOUTH DAILY     Last office visit: 10/29/24  Last lab: 9/27/24

## 2025-04-15 RX ORDER — CELECOXIB 200 MG/1
200 CAPSULE ORAL DAILY
Qty: 90 CAPSULE | Refills: 0 | Status: SHIPPED | OUTPATIENT
Start: 2025-04-15

## 2025-04-30 DIAGNOSIS — E03.9 ACQUIRED HYPOTHYROIDISM: ICD-10-CM

## 2025-04-30 RX ORDER — LEVOTHYROXINE SODIUM 25 UG/1
25 TABLET ORAL
Qty: 90 TABLET | Refills: 2 | Status: SHIPPED | OUTPATIENT
Start: 2025-04-30

## 2025-04-30 NOTE — TELEPHONE ENCOUNTER
Requested Prescriptions     Pending Prescriptions Disp Refills    levothyroxine (SYNTHROID) 25 MCG Tab [Pharmacy Med Name: LEVOTHYROXINE 25 MCG TABLET] 90 Tablet 2     Sig: TAKE ONE TABLET BY MOUTH EVERY MORNING ON AN EMPTY STOMACH     Last office visit: 10/29/24  Last lab: 9/27/24

## 2025-06-09 DIAGNOSIS — R09.02 HYPOXIA: ICD-10-CM

## 2025-06-09 DIAGNOSIS — J45.40 MODERATE PERSISTENT ASTHMA, UNSPECIFIED WHETHER COMPLICATED: ICD-10-CM

## 2025-06-09 RX ORDER — FLUTICASONE PROPIONATE AND SALMETEROL 500; 50 UG/1; UG/1
1 POWDER RESPIRATORY (INHALATION) EVERY 12 HOURS
Qty: 3 EACH | Refills: 0 | Status: SHIPPED | OUTPATIENT
Start: 2025-06-09

## 2025-06-09 NOTE — TELEPHONE ENCOUNTER
Requested Prescriptions     Pending Prescriptions Disp Refills    fluticasone-salmeterol (ADVAIR) 500-50 MCG/ACT AEROSOL POWDER, BREATH ACTIVATED [Pharmacy Med Name: FLUTICASONE-SALMETEROL 500-50] 3 Each      Sig: INHALE ONE PUFF BY MOUTH EVERY 12 HOURS     Last office visit: 10/29/24  Last lab: 9/27/24

## 2025-07-23 DIAGNOSIS — N39.0 RECURRENT UTI: ICD-10-CM

## 2025-07-24 DIAGNOSIS — G62.9 NEUROPATHY: ICD-10-CM

## 2025-07-24 DIAGNOSIS — M19.90 ARTHRITIS: ICD-10-CM

## 2025-07-25 NOTE — TELEPHONE ENCOUNTER
Requested Prescriptions     Pending Prescriptions Disp Refills    gabapentin (NEURONTIN) 300 MG Cap [Pharmacy Med Name: GABAPENTIN 300 MG CAPSULE] 270 Capsule 1     Sig: TAKE 1 CAPSULE BY MOUTH 3 TIMES A DAY    celecoxib (CELEBREX) 200 MG Cap [Pharmacy Med Name: CELECOXIB 200 MG CAPSULE] 90 Capsule 0     Sig: TAKE 1 CAPSULE BY MOUTH DAILY     Last office visit: 10/29/24  Last lab: 9/27/24

## 2025-07-25 NOTE — TELEPHONE ENCOUNTER
Requested Prescriptions     Pending Prescriptions Disp Refills    sulfamethoxazole-trimethoprim (BACTRIM) 400-80 MG Tab 90 Tablet 2     Sig: Take 1 Tablet by mouth every day.     Last office visit: 10/29/24  Last lab: 9/27/24

## 2025-07-28 RX ORDER — GABAPENTIN 300 MG/1
300 CAPSULE ORAL 3 TIMES DAILY
Qty: 90 CAPSULE | Refills: 0 | Status: SHIPPED | OUTPATIENT
Start: 2025-07-28

## 2025-07-28 RX ORDER — CELECOXIB 200 MG/1
200 CAPSULE ORAL DAILY
Qty: 90 CAPSULE | Refills: 0 | Status: SHIPPED | OUTPATIENT
Start: 2025-07-28

## 2025-07-28 RX ORDER — SULFAMETHOXAZOLE AND TRIMETHOPRIM 400; 80 MG/1; MG/1
1 TABLET ORAL DAILY
Qty: 90 TABLET | Refills: 0 | Status: SHIPPED | OUTPATIENT
Start: 2025-07-28

## (undated) DEVICE — FOAM FACEHOLDER SPIDER (8EA/BX)

## (undated) DEVICE — KIT ULTRASND COVER - (20EA/CA)

## (undated) DEVICE — SHAVER4.0 AGGRESSIVE + FORMLA (5EA/BX)

## (undated) DEVICE — GOWN WARMING STANDARD FLEX - (30/CA)

## (undated) DEVICE — GLOVE SZ 7.5 LF PROTEXIS (50PR/BX)

## (undated) DEVICE — TUBE CONNECTING SUCTION - CLEAR PLASTIC STERILE 72 IN (50EA/CA)

## (undated) DEVICE — CANNULA THREADED 5X75 (5EA/BX)

## (undated) DEVICE — DRAPE LASER ARM CAMERA - 7 X 96 (25EA/BX)

## (undated) DEVICE — TUBING CLEARLINK DUO-VENT - C-FLO (48EA/CA)

## (undated) DEVICE — TUBING CASSETTE CROSSFLOW INTEGRATED (10EA/CA)

## (undated) DEVICE — STYLETTE 6FR INFANT STERILE SINGEL ALUMINUM SUNSLIP SEALED IN PVC SHEATH (20EA/BX)

## (undated) DEVICE — Device

## (undated) DEVICE — DRAPE U SPLIT IMP 54 X 76 - (24/CA)

## (undated) DEVICE — TAPE FIBER #2 (6/BX)

## (undated) DEVICE — SODIUM CHL IRRIGATION 0.9% 1000ML (12EA/CA)

## (undated) DEVICE — SUTURE ULTRATAPE COBRAID BLUE 2MM (6EA/BX)

## (undated) DEVICE — GUIDE TRACHE TUBE INTUBATING STYLET 5.0-10.0MM 14FR (20EA/PK)

## (undated) DEVICE — CANNULA CRYSTAL 5.75MM X 7CM PART THD (5/BX)

## (undated) DEVICE — SUTURE 3-0 ETHILON PS-1 (36PK/BX)

## (undated) DEVICE — NEEDLE SPINAL NON-SAFETY 18GA X 6 (10EA/BX)

## (undated) DEVICE — SODIUM CHL. IRRIGATION 0.9% 3000ML (4EA/CA 65CA/PF)

## (undated) DEVICE — SENSOR OXIMETER ADULT SPO2 RD SET (20EA/BX)

## (undated) DEVICE — HUMID-VENT HEAT AND MOISTURE EXCHANGE- (50/BX)

## (undated) DEVICE — TOWEL STOP TIMEOUT SAFETY FLAG (40EA/CA)

## (undated) DEVICE — STOCKINETTE IMPERVIOUS 12X48 - STERILELF (10/CA)"

## (undated) DEVICE — CANISTER SUCTION RIGID RED 1500CC (40EA/CA)

## (undated) DEVICE — ABLATOR WAND SERFAS 90-S CRUISE

## (undated) DEVICE — DRAPETIBURON SHOULDER W/POUCH - (5EA/CA)

## (undated) DEVICE — DRESSING ABDOMINAL PAD STERILE 8 X 10" (360EA/CA)"

## (undated) DEVICE — SPIDER SHOULDER HOLDER (12EA/BX)

## (undated) DEVICE — SLEEVE VASO CALF MED - (10PR/CA)

## (undated) DEVICE — PACK SHOULDER ARTHROSCOPY SM - (2EA/CA)

## (undated) DEVICE — ELECTRODE DUAL RETURN W/ CORD - (50/PK)

## (undated) DEVICE — TUBE E-T HI-LO CUFF 7.5MM (10EA/PK)

## (undated) DEVICE — LACTATED RINGERS INJ 1000 ML - (14EA/CA 60CA/PF)

## (undated) DEVICE — SUTURE GENERAL

## (undated) DEVICE — WATER IRRIGATION STERILE 1000ML (12EA/CA)

## (undated) DEVICE — SET EXTENSION WITH 2 PORTS (48EA/CA) ***PART #2C8610 IS A SUBSTITUTE*****

## (undated) DEVICE — TUBING DAY USE W/CARTRIDGE (10EA/BX)

## (undated) DEVICE — SYRINGE 10 ML CONTROL LL (25EA/BX 4BX/CA)

## (undated) DEVICE — CANISTER SUCTION 3000ML MECHANICAL FILTER AUTO SHUTOFF MEDI-VAC NONSTERILE LF DISP  (40EA/CA)

## (undated) DEVICE — SUCTION INSTRUMENT YANKAUER BULBOUS TIP W/O VENT (50EA/CA)

## (undated) DEVICE — GLOVE, LITE (PAIR)

## (undated) DEVICE — DRAPE IOBAN II 23 IN X 33 IN - (10/BX)

## (undated) DEVICE — CHLORAPREP 26 ML APPLICATOR - ORANGE TINT(25/CA)

## (undated) DEVICE — PADDING CAST 6 IN STERILE - 6 X 4 YDS (24/CA)

## (undated) DEVICE — TAPE XBRAID TT 2.0MM (12EA/BX)

## (undated) DEVICE — DRAPE LARGE 3 QUARTER - (20/CA)

## (undated) DEVICE — BAG SPONGE COUNT 10.25 X 32 - BLUE (250/CA)